# Patient Record
Sex: MALE | Race: WHITE | Employment: OTHER | URBAN - METROPOLITAN AREA
[De-identification: names, ages, dates, MRNs, and addresses within clinical notes are randomized per-mention and may not be internally consistent; named-entity substitution may affect disease eponyms.]

---

## 2017-03-31 ENCOUNTER — HOSPITAL ENCOUNTER (OUTPATIENT)
Age: 67
Discharge: HOME | End: 2017-03-31
Payer: MEDICARE

## 2017-03-31 LAB
ALBUMIN SERPL-MCNC: 3.7 G/DL (ref 3.5–5.2)
ALBUMIN/GLOBULIN RATIO: 1.1 (ref 1–2.5)
ALP BLD-CCNC: 72 U/L (ref 40–129)
ALT SERPL-CCNC: 30 U/L (ref 5–41)
AMYLASE: 68 U/L (ref 28–100)
AST SERPL-CCNC: 33 U/L
BILIRUB SERPL-MCNC: 1.31 MG/DL (ref 0.3–1.2)
BILIRUBIN DIRECT: 0.28 MG/DL
BILIRUBIN, INDIRECT: 1.03 MG/DL (ref 0–1)
GLOBULIN: ABNORMAL G/DL (ref 1.5–3.8)
LIPASE: 51 U/L (ref 13–60)
TOTAL PROTEIN: 7.1 G/DL (ref 6.4–8.3)

## 2017-03-31 PROCEDURE — 83690 ASSAY OF LIPASE: CPT

## 2017-03-31 PROCEDURE — 80076 HEPATIC FUNCTION PANEL: CPT

## 2017-03-31 PROCEDURE — 36415 COLL VENOUS BLD VENIPUNCTURE: CPT

## 2017-03-31 PROCEDURE — 82150 ASSAY OF AMYLASE: CPT

## 2017-04-06 ENCOUNTER — HOSPITAL ENCOUNTER (OUTPATIENT)
Age: 67
Discharge: HOME | End: 2017-04-06
Payer: MEDICARE

## 2017-04-06 ENCOUNTER — HOSPITAL ENCOUNTER (OUTPATIENT)
Dept: CT IMAGING | Age: 67
Discharge: HOME | End: 2017-04-06
Payer: MEDICARE

## 2017-04-06 DIAGNOSIS — R10.13 EPIGASTRIC PAIN: ICD-10-CM

## 2017-04-06 DIAGNOSIS — B19.20 UNSPECIFIED VIRAL HEPATITIS C WITHOUT HEPATIC COMA: ICD-10-CM

## 2017-04-06 DIAGNOSIS — K74.60 HEPATIC CIRRHOSIS, UNSPECIFIED HEPATIC CIRRHOSIS TYPE (HCC): ICD-10-CM

## 2017-04-06 LAB
BUN BLDV-MCNC: 24 MG/DL (ref 8–23)
CREAT SERPL-MCNC: 0.89 MG/DL (ref 0.7–1.2)
GFR AFRICAN AMERICAN: >60 ML/MIN
GFR NON-AFRICAN AMERICAN: >60 ML/MIN
GFR SERPL CREATININE-BSD FRML MDRD: ABNORMAL ML/MIN/{1.73_M2}
GFR SERPL CREATININE-BSD FRML MDRD: ABNORMAL ML/MIN/{1.73_M2}

## 2017-04-06 PROCEDURE — 82565 ASSAY OF CREATININE: CPT

## 2017-04-06 PROCEDURE — 84520 ASSAY OF UREA NITROGEN: CPT

## 2017-04-06 PROCEDURE — 6360000004 HC RX CONTRAST MEDICATION: Performed by: INTERNAL MEDICINE

## 2017-04-06 PROCEDURE — 74177 CT ABD & PELVIS W/CONTRAST: CPT

## 2017-04-06 RX ADMIN — IOHEXOL 130 ML: 350 INJECTION, SOLUTION INTRAVENOUS at 09:57

## 2017-04-06 RX ADMIN — IOHEXOL 50 ML: 240 INJECTION, SOLUTION INTRATHECAL; INTRAVASCULAR; INTRAVENOUS; ORAL at 09:58

## 2017-04-14 ENCOUNTER — HOSPITAL ENCOUNTER (OUTPATIENT)
Age: 67
Setting detail: SPECIMEN
Discharge: HOME | End: 2017-04-14
Payer: MEDICARE

## 2017-04-18 LAB — SURGICAL PATHOLOGY REPORT: NORMAL

## 2017-07-13 ENCOUNTER — HOSPITAL ENCOUNTER (OUTPATIENT)
Age: 67
Discharge: HOME | End: 2017-07-13
Payer: MEDICARE

## 2017-07-13 LAB
AFP: 2.6 UG/L
ALBUMIN SERPL-MCNC: 3.7 G/DL (ref 3.5–5.2)
ALBUMIN/GLOBULIN RATIO: 1.2 (ref 1–2.5)
ALP BLD-CCNC: 70 U/L (ref 40–129)
ALT SERPL-CCNC: 30 U/L (ref 5–41)
ANION GAP SERPL CALCULATED.3IONS-SCNC: 8 MMOL/L (ref 9–17)
AST SERPL-CCNC: 30 U/L
BILIRUB SERPL-MCNC: 1.67 MG/DL (ref 0.3–1.2)
BUN BLDV-MCNC: 25 MG/DL (ref 8–23)
BUN/CREAT BLD: ABNORMAL (ref 9–20)
CALCIUM SERPL-MCNC: 9.1 MG/DL (ref 8.6–10.4)
CHLORIDE BLD-SCNC: 102 MMOL/L (ref 98–107)
CO2: 24 MMOL/L (ref 20–31)
CREAT SERPL-MCNC: 0.68 MG/DL (ref 0.7–1.2)
GFR AFRICAN AMERICAN: >60 ML/MIN
GFR NON-AFRICAN AMERICAN: >60 ML/MIN
GFR SERPL CREATININE-BSD FRML MDRD: ABNORMAL ML/MIN/{1.73_M2}
GFR SERPL CREATININE-BSD FRML MDRD: ABNORMAL ML/MIN/{1.73_M2}
GLUCOSE BLD-MCNC: 110 MG/DL (ref 70–99)
HCT VFR BLD CALC: 41.3 % (ref 41–53)
HEMOGLOBIN: 13.9 G/DL (ref 13.5–17.5)
INR BLD: 1
MCH RBC QN AUTO: 31.1 PG (ref 26–34)
MCHC RBC AUTO-ENTMCNC: 33.6 G/DL (ref 31–37)
MCV RBC AUTO: 92.6 FL (ref 80–100)
PDW BLD-RTO: 15.1 % (ref 12.5–15.4)
PLATELET # BLD: 55 K/UL (ref 140–450)
PMV BLD AUTO: 8.2 FL (ref 6–12)
POTASSIUM SERPL-SCNC: 4.5 MMOL/L (ref 3.7–5.3)
PROTHROMBIN TIME: 11.3 SEC (ref 9.4–12.6)
RBC # BLD: 4.46 M/UL (ref 4.5–5.9)
SODIUM BLD-SCNC: 134 MMOL/L (ref 135–144)
TOTAL PROTEIN: 6.9 G/DL (ref 6.4–8.3)
WBC # BLD: 3.4 K/UL (ref 3.5–11)

## 2017-07-13 PROCEDURE — 85027 COMPLETE CBC AUTOMATED: CPT

## 2017-07-13 PROCEDURE — 36415 COLL VENOUS BLD VENIPUNCTURE: CPT

## 2017-07-13 PROCEDURE — 85610 PROTHROMBIN TIME: CPT

## 2017-07-13 PROCEDURE — 82105 ALPHA-FETOPROTEIN SERUM: CPT

## 2017-07-13 PROCEDURE — 80053 COMPREHEN METABOLIC PANEL: CPT

## 2017-11-08 ENCOUNTER — HOSPITAL ENCOUNTER (OUTPATIENT)
Dept: ULTRASOUND IMAGING | Age: 67
Discharge: HOME | End: 2017-11-08
Payer: MEDICARE

## 2017-11-08 DIAGNOSIS — K70.30 ALCOHOLIC CIRRHOSIS OF LIVER WITHOUT ASCITES (HCC): ICD-10-CM

## 2017-11-08 PROCEDURE — 76705 ECHO EXAM OF ABDOMEN: CPT

## 2017-12-18 ENCOUNTER — APPOINTMENT (OUTPATIENT)
Dept: ULTRASOUND IMAGING | Age: 67
End: 2017-12-18
Attending: INTERNAL MEDICINE
Payer: MEDICARE

## 2017-12-18 ENCOUNTER — ANESTHESIA (OUTPATIENT)
Dept: ENDOSCOPY | Age: 67
End: 2017-12-18
Payer: MEDICARE

## 2017-12-18 ENCOUNTER — HOSPITAL ENCOUNTER (OUTPATIENT)
Age: 67
Setting detail: OUTPATIENT SURGERY
Discharge: HOME | End: 2017-12-18
Attending: INTERNAL MEDICINE | Admitting: INTERNAL MEDICINE
Payer: MEDICARE

## 2017-12-18 ENCOUNTER — ANESTHESIA EVENT (OUTPATIENT)
Dept: ENDOSCOPY | Age: 67
End: 2017-12-18
Payer: MEDICARE

## 2017-12-18 VITALS
SYSTOLIC BLOOD PRESSURE: 133 MMHG | OXYGEN SATURATION: 96 % | DIASTOLIC BLOOD PRESSURE: 61 MMHG | RESPIRATION RATE: 10 BRPM | WEIGHT: 238 LBS | HEART RATE: 65 BPM | BODY MASS INDEX: 34.07 KG/M2 | HEIGHT: 70 IN | TEMPERATURE: 97.7 F

## 2017-12-18 VITALS
RESPIRATION RATE: 13 BRPM | SYSTOLIC BLOOD PRESSURE: 80 MMHG | DIASTOLIC BLOOD PRESSURE: 49 MMHG | OXYGEN SATURATION: 94 %

## 2017-12-18 LAB — POC POTASSIUM: 4.6 MMOL/L (ref 3.5–4.5)

## 2017-12-18 PROCEDURE — 6370000000 HC RX 637 (ALT 250 FOR IP): Performed by: ANESTHESIOLOGY

## 2017-12-18 PROCEDURE — 76975 GI ENDOSCOPIC ULTRASOUND: CPT

## 2017-12-18 PROCEDURE — 3700000001 HC ADD 15 MINUTES (ANESTHESIA): Performed by: INTERNAL MEDICINE

## 2017-12-18 PROCEDURE — 6360000002 HC RX W HCPCS: Performed by: NURSE ANESTHETIST, CERTIFIED REGISTERED

## 2017-12-18 PROCEDURE — 2580000003 HC RX 258: Performed by: NURSE ANESTHETIST, CERTIFIED REGISTERED

## 2017-12-18 PROCEDURE — 88305 TISSUE EXAM BY PATHOLOGIST: CPT

## 2017-12-18 PROCEDURE — 7100000010 HC PHASE II RECOVERY - FIRST 15 MIN: Performed by: INTERNAL MEDICINE

## 2017-12-18 PROCEDURE — 2500000003 HC RX 250 WO HCPCS: Performed by: NURSE ANESTHETIST, CERTIFIED REGISTERED

## 2017-12-18 PROCEDURE — 7100000011 HC PHASE II RECOVERY - ADDTL 15 MIN: Performed by: INTERNAL MEDICINE

## 2017-12-18 PROCEDURE — 3609012400 HC EGD TRANSORAL BIOPSY SINGLE/MULTIPLE: Performed by: INTERNAL MEDICINE

## 2017-12-18 PROCEDURE — 3609018700 HC ENDOSCOPIC ULTRASOUND: Performed by: INTERNAL MEDICINE

## 2017-12-18 PROCEDURE — 3700000000 HC ANESTHESIA ATTENDED CARE: Performed by: INTERNAL MEDICINE

## 2017-12-18 PROCEDURE — 84132 ASSAY OF SERUM POTASSIUM: CPT

## 2017-12-18 RX ORDER — FENTANYL CITRATE 50 UG/ML
INJECTION, SOLUTION INTRAMUSCULAR; INTRAVENOUS PRN
Status: DISCONTINUED | OUTPATIENT
Start: 2017-12-18 | End: 2017-12-18 | Stop reason: SDUPTHER

## 2017-12-18 RX ORDER — GLYCOPYRROLATE 0.2 MG/ML
INJECTION INTRAMUSCULAR; INTRAVENOUS PRN
Status: DISCONTINUED | OUTPATIENT
Start: 2017-12-18 | End: 2017-12-18 | Stop reason: SDUPTHER

## 2017-12-18 RX ORDER — LIDOCAINE HYDROCHLORIDE 10 MG/ML
INJECTION, SOLUTION EPIDURAL; INFILTRATION; INTRACAUDAL; PERINEURAL PRN
Status: DISCONTINUED | OUTPATIENT
Start: 2017-12-18 | End: 2017-12-18 | Stop reason: SDUPTHER

## 2017-12-18 RX ORDER — M-VIT,TX,IRON,MINS/CALC/FOLIC 27MG-0.4MG
1 TABLET ORAL DAILY
COMMUNITY

## 2017-12-18 RX ORDER — IPRATROPIUM BROMIDE AND ALBUTEROL SULFATE 2.5; .5 MG/3ML; MG/3ML
1 SOLUTION RESPIRATORY (INHALATION)
Status: DISCONTINUED | OUTPATIENT
Start: 2017-12-18 | End: 2017-12-18 | Stop reason: HOSPADM

## 2017-12-18 RX ORDER — PROPOFOL 10 MG/ML
INJECTION, EMULSION INTRAVENOUS CONTINUOUS PRN
Status: DISCONTINUED | OUTPATIENT
Start: 2017-12-18 | End: 2017-12-18 | Stop reason: SDUPTHER

## 2017-12-18 RX ORDER — SODIUM CHLORIDE 9 MG/ML
INJECTION, SOLUTION INTRAVENOUS CONTINUOUS
Status: DISCONTINUED | OUTPATIENT
Start: 2017-12-18 | End: 2017-12-18 | Stop reason: HOSPADM

## 2017-12-18 RX ORDER — PROPOFOL 10 MG/ML
INJECTION, EMULSION INTRAVENOUS PRN
Status: DISCONTINUED | OUTPATIENT
Start: 2017-12-18 | End: 2017-12-18 | Stop reason: SDUPTHER

## 2017-12-18 RX ORDER — SODIUM CHLORIDE 9 MG/ML
INJECTION, SOLUTION INTRAVENOUS CONTINUOUS PRN
Status: DISCONTINUED | OUTPATIENT
Start: 2017-12-18 | End: 2017-12-18 | Stop reason: SDUPTHER

## 2017-12-18 RX ADMIN — FENTANYL CITRATE 50 MCG: 50 INJECTION INTRAMUSCULAR; INTRAVENOUS at 08:38

## 2017-12-18 RX ADMIN — GLYCOPYRROLATE 0.2 MG: 0.2 INJECTION, SOLUTION INTRAMUSCULAR; INTRAVENOUS at 08:36

## 2017-12-18 RX ADMIN — PROPOFOL 30 MG: 10 INJECTION, EMULSION INTRAVENOUS at 08:44

## 2017-12-18 RX ADMIN — PHENYLEPHRINE HYDROCHLORIDE 50 MCG: 10 INJECTION INTRAVENOUS at 09:16

## 2017-12-18 RX ADMIN — IPRATROPIUM BROMIDE AND ALBUTEROL SULFATE 1 AMPULE: .5; 3 SOLUTION RESPIRATORY (INHALATION) at 08:20

## 2017-12-18 RX ADMIN — PROPOFOL 100 MCG/KG/MIN: 10 INJECTION, EMULSION INTRAVENOUS at 08:41

## 2017-12-18 RX ADMIN — PROPOFOL 20 MG: 10 INJECTION, EMULSION INTRAVENOUS at 08:51

## 2017-12-18 RX ADMIN — PROPOFOL 20 MG: 10 INJECTION, EMULSION INTRAVENOUS at 08:47

## 2017-12-18 RX ADMIN — PROPOFOL 70 MG: 10 INJECTION, EMULSION INTRAVENOUS at 08:41

## 2017-12-18 RX ADMIN — LIDOCAINE HYDROCHLORIDE 50 MG: 10 INJECTION, SOLUTION EPIDURAL; INFILTRATION; INTRACAUDAL; PERINEURAL at 08:41

## 2017-12-18 RX ADMIN — SODIUM CHLORIDE: 9 INJECTION, SOLUTION INTRAVENOUS at 08:34

## 2017-12-18 RX ADMIN — PROPOFOL 20 MG: 10 INJECTION, EMULSION INTRAVENOUS at 08:54

## 2017-12-18 RX ADMIN — PHENYLEPHRINE HYDROCHLORIDE 50 MCG: 10 INJECTION INTRAVENOUS at 09:07

## 2017-12-18 ASSESSMENT — LIFESTYLE VARIABLES: SMOKING_STATUS: 0

## 2017-12-18 ASSESSMENT — PAIN SCALES - GENERAL
PAINLEVEL_OUTOF10: 0

## 2017-12-18 ASSESSMENT — PAIN - FUNCTIONAL ASSESSMENT: PAIN_FUNCTIONAL_ASSESSMENT: 0-10

## 2017-12-18 ASSESSMENT — ENCOUNTER SYMPTOMS
SHORTNESS OF BREATH: 0
STRIDOR: 0

## 2017-12-18 NOTE — ANESTHESIA POSTPROCEDURE EVALUATION
Department of Anesthesiology  Postprocedure Note    Patient: Erlinda Infante  MRN: [id number]  YOB: 1950  Date of evaluation: 12/18/2017  Time:  11:35 AM     Procedure Summary     Date:  12/18/17 Room / Location:  [facility] ENDO 04 / [facility] Endoscopy    Anesthesia Start:  0834 Anesthesia Stop:  Cleotis Stager    Procedures:       ENDOSCOPIC ULTRASOUND (N/A )      EGD BIOPSY Diagnosis:  (UPPER ABDOMINAL PAIN, VARICEAL SCREENING)    Surgeon:  Sofia Cruz MD Responsible Provider:  Omer Faustin MD    Anesthesia Type:  MAC ASA Status:  3          Anesthesia Type: MAC    René Phase I: René Score: 10    René Phase II: René Score: 10    Last vitals: Reviewed and per EMR flowsheets.        Anesthesia Post Evaluation    Patient location during evaluation: PACU  Patient participation: complete - patient participated  Level of consciousness: awake and alert  Pain score: 1  Airway patency: patent  Nausea & Vomiting: no nausea and no vomiting  Complications: no  Cardiovascular status: hemodynamically stable  Respiratory status: acceptable and room air  Hydration status: stable

## 2017-12-18 NOTE — H&P
Hobbies:  Rides a [employer], watches  And TV westerns ,     OBJECTIVE:   VITALS:  height is 5' 9.5\" (1.765 m) and weight is 238 lb (108 kg). His oral temperature is 98.3 °F (36.8 °C). His blood pressure is 138/60 and his pulse is 60. His respiration is 13 and oxygen saturation is 95%. CONSTITUTIONAL: Alert and oriented times 3, no acute distress and cooperative to examination. friendly and pleasant , stocky build     SKIN: rash No    HEENT: Head is normocephalic, atraumatic. EOMI, PERRLA    Oral air way :slightly narrow Yes    NECK: neck supple, no lymphadenopathy noted, trachea midline and straight       2+ carotid, no bruit    LUNGS: Chest expands equally bilaterally upon respiration, no accessory muscle used. Ausculation reveals no adventitious breath sounds. CARDIOVASCULAR: \"Heart sounds are normal.  Regular rate and rhythm without murmur,    ABDOMEN: Bowel sounds are present in all four quadrants , bloated      GENATALIA:Deferred. NEUROLOGIC: \"CN II-XII are grossly intact. EXTREMITIES: Pitting edema:  No,   Right hand partial amputations of his digits , left hand contracture of his 2nd digit   Varicose veins: No     Dorsal pedal/posterior tibial pulses palpable: Yes         Strength:   Not tested       Patient Active Problem List   Diagnosis    Heart palpitations    Diastolic heart failure (HCC)    Alcoholic liver disease (HCC)    Hypertension    History of hepatitis C    Hyperglycemia    Hyperammonemia (HCC)    Nausea & vomiting               IMPRESSIONS:   1. Hep-C  2.  does not have any pertinent problems on file.     [facility]  Electronically signed 12/18/2017 at 8:08 AM       Scheduled for:  EUS

## 2017-12-19 LAB — SURGICAL PATHOLOGY REPORT: NORMAL

## 2018-01-15 ENCOUNTER — HOSPITAL ENCOUNTER (OUTPATIENT)
Age: 68
Discharge: HOME | End: 2018-01-15
Payer: MEDICARE

## 2018-01-15 LAB
ABSOLUTE EOS #: 0.1 K/UL (ref 0–0.44)
ABSOLUTE IMMATURE GRANULOCYTE: 0.03 K/UL (ref 0–0.3)
ABSOLUTE LYMPH #: 0.58 K/UL (ref 1.1–3.7)
ABSOLUTE MONO #: 0.28 K/UL (ref 0.1–1.2)
ALBUMIN SERPL-MCNC: 3.7 G/DL (ref 3.5–5.2)
ALBUMIN/GLOBULIN RATIO: 1.2 (ref 1–2.5)
ALP BLD-CCNC: 66 U/L (ref 40–129)
ALT SERPL-CCNC: 37 U/L (ref 5–41)
ANION GAP SERPL CALCULATED.3IONS-SCNC: 11 MMOL/L (ref 9–17)
AST SERPL-CCNC: 37 U/L
BASOPHILS # BLD: 1 % (ref 0–2)
BASOPHILS ABSOLUTE: 0.03 K/UL (ref 0–0.2)
BILIRUB SERPL-MCNC: 1.29 MG/DL (ref 0.3–1.2)
BUN BLDV-MCNC: 23 MG/DL (ref 8–23)
BUN/CREAT BLD: ABNORMAL (ref 9–20)
CALCIUM SERPL-MCNC: 8.7 MG/DL (ref 8.6–10.4)
CHLORIDE BLD-SCNC: 103 MMOL/L (ref 98–107)
CHOLESTEROL, FASTING: 152 MG/DL
CHOLESTEROL/HDL RATIO: 2.8
CO2: 24 MMOL/L (ref 20–31)
CREAT SERPL-MCNC: 0.75 MG/DL (ref 0.7–1.2)
DIFFERENTIAL TYPE: ABNORMAL
EOSINOPHILS RELATIVE PERCENT: 4 % (ref 1–4)
ESTIMATED AVERAGE GLUCOSE: 114 MG/DL
GFR AFRICAN AMERICAN: >60 ML/MIN
GFR NON-AFRICAN AMERICAN: >60 ML/MIN
GFR SERPL CREATININE-BSD FRML MDRD: ABNORMAL ML/MIN/{1.73_M2}
GFR SERPL CREATININE-BSD FRML MDRD: ABNORMAL ML/MIN/{1.73_M2}
GLUCOSE BLD-MCNC: 115 MG/DL (ref 70–99)
HBA1C MFR BLD: 5.6 % (ref 4–6)
HCT VFR BLD CALC: 40.2 % (ref 40.7–50.3)
HDLC SERPL-MCNC: 55 MG/DL
HEMOGLOBIN: 12.9 G/DL (ref 13–17)
IMMATURE GRANULOCYTES: 1 %
LDL CHOLESTEROL: 81 MG/DL (ref 0–130)
LYMPHOCYTES # BLD: 23 % (ref 24–43)
MCH RBC QN AUTO: 31.9 PG (ref 25.2–33.5)
MCHC RBC AUTO-ENTMCNC: 32.1 G/DL (ref 28.4–34.8)
MCV RBC AUTO: 99.5 FL (ref 82.6–102.9)
MONOCYTES # BLD: 11 % (ref 3–12)
MORPHOLOGY: NORMAL
PDW BLD-RTO: 13.3 % (ref 11.8–14.4)
PLATELET # BLD: ABNORMAL K/UL (ref 138–453)
PLATELET ESTIMATE: ABNORMAL
PLATELET, FLUORESCENCE: 50 K/UL (ref 138–453)
PLATELET, IMMATURE FRACTION: 3.5 % (ref 1.1–10.3)
PMV BLD AUTO: ABNORMAL FL (ref 8.1–13.5)
POTASSIUM SERPL-SCNC: 4.5 MMOL/L (ref 3.7–5.3)
PROSTATE SPECIFIC ANTIGEN: 0.29 UG/L
RBC # BLD: 4.04 M/UL (ref 4.21–5.77)
RBC # BLD: ABNORMAL 10*6/UL
SEG NEUTROPHILS: 60 % (ref 36–65)
SEGMENTED NEUTROPHILS ABSOLUTE COUNT: 1.48 K/UL (ref 1.5–8.1)
SODIUM BLD-SCNC: 138 MMOL/L (ref 135–144)
TOTAL PROTEIN: 6.7 G/DL (ref 6.4–8.3)
TRIGLYCERIDE, FASTING: 81 MG/DL
TSH SERPL DL<=0.05 MIU/L-ACNC: 1.63 MIU/L (ref 0.3–5)
VLDLC SERPL CALC-MCNC: NORMAL MG/DL (ref 1–30)
WBC # BLD: 2.5 K/UL (ref 3.5–11.3)
WBC # BLD: ABNORMAL 10*3/UL

## 2018-01-15 PROCEDURE — 36415 COLL VENOUS BLD VENIPUNCTURE: CPT

## 2018-01-15 PROCEDURE — 83036 HEMOGLOBIN GLYCOSYLATED A1C: CPT

## 2018-01-15 PROCEDURE — 80053 COMPREHEN METABOLIC PANEL: CPT

## 2018-01-15 PROCEDURE — 80061 LIPID PANEL: CPT

## 2018-01-15 PROCEDURE — G0103 PSA SCREENING: HCPCS

## 2018-01-15 PROCEDURE — 84443 ASSAY THYROID STIM HORMONE: CPT

## 2018-01-15 PROCEDURE — 85025 COMPLETE CBC W/AUTO DIFF WBC: CPT

## 2018-04-25 ENCOUNTER — HOSPITAL ENCOUNTER (OUTPATIENT)
Dept: MRI IMAGING | Age: 68
Discharge: HOME | End: 2018-04-27
Payer: MEDICARE

## 2018-04-25 DIAGNOSIS — M25.561 RIGHT KNEE PAIN, UNSPECIFIED CHRONICITY: ICD-10-CM

## 2018-04-25 PROCEDURE — 73721 MRI JNT OF LWR EXTRE W/O DYE: CPT

## 2018-07-03 ENCOUNTER — HOSPITAL ENCOUNTER (OUTPATIENT)
Dept: ULTRASOUND IMAGING | Age: 68
Discharge: HOME | End: 2018-07-05
Payer: MEDICARE

## 2018-07-03 DIAGNOSIS — K74.60 CIRRHOSIS OF LIVER WITHOUT ASCITES, UNSPECIFIED HEPATIC CIRRHOSIS TYPE (HCC): ICD-10-CM

## 2018-07-03 PROCEDURE — 76705 ECHO EXAM OF ABDOMEN: CPT

## 2018-07-10 ENCOUNTER — HOSPITAL ENCOUNTER (OUTPATIENT)
Age: 68
Discharge: HOME | End: 2018-07-10
Payer: MEDICARE

## 2018-07-10 LAB
AFP: 2.2 UG/L
ALBUMIN SERPL-MCNC: 3.7 G/DL (ref 3.5–5.2)
ALBUMIN/GLOBULIN RATIO: 1.2 (ref 1–2.5)
ALP BLD-CCNC: 77 U/L (ref 40–129)
ALT SERPL-CCNC: 26 U/L (ref 5–41)
ANION GAP SERPL CALCULATED.3IONS-SCNC: 11 MMOL/L (ref 9–17)
AST SERPL-CCNC: 29 U/L
BILIRUB SERPL-MCNC: 1.16 MG/DL (ref 0.3–1.2)
BUN BLDV-MCNC: 19 MG/DL (ref 8–23)
BUN/CREAT BLD: ABNORMAL (ref 9–20)
CALCIUM SERPL-MCNC: 9.3 MG/DL (ref 8.6–10.4)
CHLORIDE BLD-SCNC: 105 MMOL/L (ref 98–107)
CO2: 25 MMOL/L (ref 20–31)
CREAT SERPL-MCNC: 0.77 MG/DL (ref 0.7–1.2)
GFR AFRICAN AMERICAN: >60 ML/MIN
GFR NON-AFRICAN AMERICAN: >60 ML/MIN
GFR SERPL CREATININE-BSD FRML MDRD: ABNORMAL ML/MIN/{1.73_M2}
GFR SERPL CREATININE-BSD FRML MDRD: ABNORMAL ML/MIN/{1.73_M2}
GLUCOSE BLD-MCNC: 111 MG/DL (ref 70–99)
HCT VFR BLD CALC: 40 % (ref 40.7–50.3)
HEMOGLOBIN: 12.9 G/DL (ref 13–17)
INR BLD: 1
MCH RBC QN AUTO: 31.3 PG (ref 25.2–33.5)
MCHC RBC AUTO-ENTMCNC: 32.3 G/DL (ref 28.4–34.8)
MCV RBC AUTO: 97.1 FL (ref 82.6–102.9)
NRBC AUTOMATED: 0 PER 100 WBC
PDW BLD-RTO: 13.1 % (ref 11.8–14.4)
PLATELET # BLD: 57 K/UL (ref 138–453)
PMV BLD AUTO: 11 FL (ref 8.1–13.5)
POTASSIUM SERPL-SCNC: 4.3 MMOL/L (ref 3.7–5.3)
PROTHROMBIN TIME: 10.8 SEC (ref 9–12)
RBC # BLD: 4.12 M/UL (ref 4.21–5.77)
SODIUM BLD-SCNC: 141 MMOL/L (ref 135–144)
TOTAL PROTEIN: 6.8 G/DL (ref 6.4–8.3)
WBC # BLD: 3.3 K/UL (ref 3.5–11.3)

## 2018-07-10 PROCEDURE — 80053 COMPREHEN METABOLIC PANEL: CPT

## 2018-07-10 PROCEDURE — 85610 PROTHROMBIN TIME: CPT

## 2018-07-10 PROCEDURE — 82105 ALPHA-FETOPROTEIN SERUM: CPT

## 2018-07-10 PROCEDURE — 85027 COMPLETE CBC AUTOMATED: CPT

## 2018-07-10 PROCEDURE — G0480 DRUG TEST DEF 1-7 CLASSES: HCPCS

## 2018-07-10 PROCEDURE — 36415 COLL VENOUS BLD VENIPUNCTURE: CPT

## 2018-07-17 LAB
SEND OUT REPORT: NORMAL
TEST NAME: NORMAL

## 2018-10-01 ENCOUNTER — HOSPITAL ENCOUNTER (OUTPATIENT)
Age: 68
Discharge: HOME | End: 2018-10-01
Payer: MEDICARE

## 2018-10-01 LAB
ABSOLUTE EOS #: 0.08 K/UL (ref 0–0.44)
ABSOLUTE IMMATURE GRANULOCYTE: <0.03 K/UL (ref 0–0.3)
ABSOLUTE LYMPH #: 0.7 K/UL (ref 1.1–3.7)
ABSOLUTE MONO #: 0.4 K/UL (ref 0.1–1.2)
AFP: 2.2 UG/L
ALBUMIN SERPL-MCNC: 3.8 G/DL (ref 3.5–5.2)
ALBUMIN/GLOBULIN RATIO: 1.1 (ref 1–2.5)
ALP BLD-CCNC: 71 U/L (ref 40–129)
ALT SERPL-CCNC: 31 U/L (ref 5–41)
ANION GAP SERPL CALCULATED.3IONS-SCNC: 11 MMOL/L (ref 9–17)
AST SERPL-CCNC: 34 U/L
BASOPHILS # BLD: 1 % (ref 0–2)
BASOPHILS ABSOLUTE: <0.03 K/UL (ref 0–0.2)
BILIRUB SERPL-MCNC: 1.44 MG/DL (ref 0.3–1.2)
BUN BLDV-MCNC: 20 MG/DL (ref 8–23)
BUN/CREAT BLD: ABNORMAL (ref 9–20)
CALCIUM SERPL-MCNC: 9.4 MG/DL (ref 8.6–10.4)
CHLORIDE BLD-SCNC: 104 MMOL/L (ref 98–107)
CO2: 23 MMOL/L (ref 20–31)
CREAT SERPL-MCNC: 0.72 MG/DL (ref 0.7–1.2)
DIFFERENTIAL TYPE: ABNORMAL
EOSINOPHILS RELATIVE PERCENT: 2 % (ref 1–4)
GFR AFRICAN AMERICAN: >60 ML/MIN
GFR NON-AFRICAN AMERICAN: >60 ML/MIN
GFR SERPL CREATININE-BSD FRML MDRD: ABNORMAL ML/MIN/{1.73_M2}
GFR SERPL CREATININE-BSD FRML MDRD: ABNORMAL ML/MIN/{1.73_M2}
GLUCOSE BLD-MCNC: 115 MG/DL (ref 70–99)
HCT VFR BLD CALC: 44.4 % (ref 40.7–50.3)
HEMOGLOBIN: 14.5 G/DL (ref 13–17)
IMMATURE GRANULOCYTES: 1 %
INR BLD: 1
LYMPHOCYTES # BLD: 20 % (ref 24–43)
MCH RBC QN AUTO: 30.8 PG (ref 25.2–33.5)
MCHC RBC AUTO-ENTMCNC: 32.7 G/DL (ref 28.4–34.8)
MCV RBC AUTO: 94.3 FL (ref 82.6–102.9)
MONOCYTES # BLD: 12 % (ref 3–12)
NRBC AUTOMATED: 0 PER 100 WBC
PDW BLD-RTO: 13.6 % (ref 11.8–14.4)
PLATELET # BLD: ABNORMAL K/UL (ref 138–453)
PLATELET ESTIMATE: ABNORMAL
PLATELET, FLUORESCENCE: 58 K/UL (ref 138–453)
PLATELET, IMMATURE FRACTION: 3.5 % (ref 1.1–10.3)
PMV BLD AUTO: ABNORMAL FL (ref 8.1–13.5)
POTASSIUM SERPL-SCNC: 4.2 MMOL/L (ref 3.7–5.3)
PROTHROMBIN TIME: 10.8 SEC (ref 9–12)
RBC # BLD: 4.71 M/UL (ref 4.21–5.77)
RBC # BLD: ABNORMAL 10*6/UL
SEG NEUTROPHILS: 65 % (ref 36–65)
SEGMENTED NEUTROPHILS ABSOLUTE COUNT: 2.25 K/UL (ref 1.5–8.1)
SODIUM BLD-SCNC: 138 MMOL/L (ref 135–144)
TOTAL PROTEIN: 7.2 G/DL (ref 6.4–8.3)
WBC # BLD: 3.5 K/UL (ref 3.5–11.3)
WBC # BLD: ABNORMAL 10*3/UL

## 2018-10-01 PROCEDURE — 85055 RETICULATED PLATELET ASSAY: CPT

## 2018-10-01 PROCEDURE — 85610 PROTHROMBIN TIME: CPT

## 2018-10-01 PROCEDURE — 85025 COMPLETE CBC W/AUTO DIFF WBC: CPT

## 2018-10-01 PROCEDURE — 36415 COLL VENOUS BLD VENIPUNCTURE: CPT

## 2018-10-01 PROCEDURE — 80053 COMPREHEN METABOLIC PANEL: CPT

## 2018-10-01 PROCEDURE — 82105 ALPHA-FETOPROTEIN SERUM: CPT

## 2019-01-19 ENCOUNTER — HOSPITAL ENCOUNTER (OUTPATIENT)
Age: 69
Discharge: HOME | End: 2019-01-19
Payer: MEDICARE

## 2019-01-19 LAB
ABSOLUTE EOS #: 0.12 K/UL (ref 0–0.44)
ABSOLUTE IMMATURE GRANULOCYTE: <0.03 K/UL (ref 0–0.3)
ABSOLUTE LYMPH #: 0.77 K/UL (ref 1.1–3.7)
ABSOLUTE MONO #: 0.34 K/UL (ref 0.1–1.2)
ALBUMIN SERPL-MCNC: 3.9 G/DL (ref 3.5–5.2)
ALBUMIN/GLOBULIN RATIO: 1.3 (ref 1–2.5)
ALP BLD-CCNC: 68 U/L (ref 40–129)
ALT SERPL-CCNC: 32 U/L (ref 5–41)
ANION GAP SERPL CALCULATED.3IONS-SCNC: 12 MMOL/L (ref 9–17)
AST SERPL-CCNC: 36 U/L
BASOPHILS # BLD: 1 % (ref 0–2)
BASOPHILS ABSOLUTE: <0.03 K/UL (ref 0–0.2)
BILIRUB SERPL-MCNC: 1.48 MG/DL (ref 0.3–1.2)
BUN BLDV-MCNC: 33 MG/DL (ref 8–23)
BUN/CREAT BLD: ABNORMAL (ref 9–20)
CALCIUM SERPL-MCNC: 10.2 MG/DL (ref 8.6–10.4)
CHLORIDE BLD-SCNC: 104 MMOL/L (ref 98–107)
CHOLESTEROL/HDL RATIO: 3.2
CHOLESTEROL: 170 MG/DL
CO2: 23 MMOL/L (ref 20–31)
CREAT SERPL-MCNC: 1.1 MG/DL (ref 0.7–1.2)
DIFFERENTIAL TYPE: ABNORMAL
EOSINOPHILS RELATIVE PERCENT: 4 % (ref 1–4)
GFR AFRICAN AMERICAN: >60 ML/MIN
GFR NON-AFRICAN AMERICAN: >60 ML/MIN
GFR SERPL CREATININE-BSD FRML MDRD: ABNORMAL ML/MIN/{1.73_M2}
GFR SERPL CREATININE-BSD FRML MDRD: ABNORMAL ML/MIN/{1.73_M2}
GLUCOSE BLD-MCNC: 118 MG/DL (ref 70–99)
HCT VFR BLD CALC: 43.7 % (ref 40.7–50.3)
HDLC SERPL-MCNC: 53 MG/DL
HEMOGLOBIN: 14.2 G/DL (ref 13–17)
IMMATURE GRANULOCYTES: 1 %
LDL CHOLESTEROL: 99 MG/DL (ref 0–130)
LYMPHOCYTES # BLD: 24 % (ref 24–43)
MCH RBC QN AUTO: 31.9 PG (ref 25.2–33.5)
MCHC RBC AUTO-ENTMCNC: 32.5 G/DL (ref 28.4–34.8)
MCV RBC AUTO: 98.2 FL (ref 82.6–102.9)
MONOCYTES # BLD: 10 % (ref 3–12)
NRBC AUTOMATED: 0 PER 100 WBC
PDW BLD-RTO: 13.4 % (ref 11.8–14.4)
PLATELET # BLD: 57 K/UL (ref 138–453)
PLATELET ESTIMATE: ABNORMAL
PMV BLD AUTO: 10.4 FL (ref 8.1–13.5)
POTASSIUM SERPL-SCNC: 4.3 MMOL/L (ref 3.7–5.3)
PROSTATE SPECIFIC ANTIGEN: 0.45 UG/L
RBC # BLD: 4.45 M/UL (ref 4.21–5.77)
RBC # BLD: ABNORMAL 10*6/UL
SEG NEUTROPHILS: 60 % (ref 36–65)
SEGMENTED NEUTROPHILS ABSOLUTE COUNT: 1.99 K/UL (ref 1.5–8.1)
SODIUM BLD-SCNC: 139 MMOL/L (ref 135–144)
TOTAL PROTEIN: 6.8 G/DL (ref 6.4–8.3)
TRIGL SERPL-MCNC: 92 MG/DL
TSH SERPL DL<=0.05 MIU/L-ACNC: 2.12 MIU/L (ref 0.3–5)
VLDLC SERPL CALC-MCNC: NORMAL MG/DL (ref 1–30)
WBC # BLD: 3.3 K/UL (ref 3.5–11.3)
WBC # BLD: ABNORMAL 10*3/UL

## 2019-01-19 PROCEDURE — 85025 COMPLETE CBC W/AUTO DIFF WBC: CPT

## 2019-01-19 PROCEDURE — 84443 ASSAY THYROID STIM HORMONE: CPT

## 2019-01-19 PROCEDURE — 80061 LIPID PANEL: CPT

## 2019-01-19 PROCEDURE — 36415 COLL VENOUS BLD VENIPUNCTURE: CPT

## 2019-01-19 PROCEDURE — 80053 COMPREHEN METABOLIC PANEL: CPT

## 2019-01-19 PROCEDURE — G0103 PSA SCREENING: HCPCS

## 2019-01-25 ENCOUNTER — HOSPITAL ENCOUNTER (OUTPATIENT)
Dept: ULTRASOUND IMAGING | Age: 69
Discharge: HOME | End: 2019-01-27
Payer: MEDICARE

## 2019-01-25 DIAGNOSIS — K74.60 CIRRHOSIS OF LIVER WITHOUT ASCITES, UNSPECIFIED HEPATIC CIRRHOSIS TYPE (HCC): ICD-10-CM

## 2019-01-25 PROCEDURE — 76705 ECHO EXAM OF ABDOMEN: CPT

## 2019-03-07 ENCOUNTER — HOSPITAL ENCOUNTER (OUTPATIENT)
Age: 69
Discharge: HOME | End: 2019-03-07
Payer: MEDICARE

## 2019-03-07 ENCOUNTER — HOSPITAL ENCOUNTER (OUTPATIENT)
Dept: CT IMAGING | Age: 69
Discharge: HOME | End: 2019-03-09
Payer: MEDICARE

## 2019-03-07 DIAGNOSIS — R93.89 ULTRASOUND SCAN ABNORMAL: Primary | ICD-10-CM

## 2019-03-07 DIAGNOSIS — R93.89 ULTRASOUND SCAN ABNORMAL: ICD-10-CM

## 2019-03-07 LAB
CREAT SERPL-MCNC: 1.01 MG/DL (ref 0.7–1.2)
GFR AFRICAN AMERICAN: >60 ML/MIN
GFR NON-AFRICAN AMERICAN: >60 ML/MIN
GFR SERPL CREATININE-BSD FRML MDRD: NORMAL ML/MIN/{1.73_M2}
GFR SERPL CREATININE-BSD FRML MDRD: NORMAL ML/MIN/{1.73_M2}

## 2019-03-07 PROCEDURE — 74170 CT ABD WO CNTRST FLWD CNTRST: CPT

## 2019-03-07 PROCEDURE — 36415 COLL VENOUS BLD VENIPUNCTURE: CPT

## 2019-03-07 PROCEDURE — 6360000004 HC RX CONTRAST MEDICATION: Performed by: INTERNAL MEDICINE

## 2019-03-07 PROCEDURE — 82565 ASSAY OF CREATININE: CPT

## 2019-03-07 RX ADMIN — IOVERSOL 100 ML: 741 INJECTION INTRA-ARTERIAL; INTRAVENOUS at 11:37

## 2019-04-16 ENCOUNTER — APPOINTMENT (OUTPATIENT)
Dept: GENERAL RADIOLOGY | Age: 69
End: 2019-04-16
Payer: OTHER MISCELLANEOUS

## 2019-04-16 ENCOUNTER — APPOINTMENT (OUTPATIENT)
Dept: CT IMAGING | Age: 69
End: 2019-04-16
Payer: OTHER MISCELLANEOUS

## 2019-04-16 ENCOUNTER — HOSPITAL ENCOUNTER (EMERGENCY)
Age: 69
Discharge: HOME | End: 2019-04-16
Attending: EMERGENCY MEDICINE
Payer: OTHER MISCELLANEOUS

## 2019-04-16 VITALS
RESPIRATION RATE: 18 BRPM | SYSTOLIC BLOOD PRESSURE: 141 MMHG | OXYGEN SATURATION: 96 % | BODY MASS INDEX: 34.96 KG/M2 | WEIGHT: 236 LBS | HEART RATE: 60 BPM | DIASTOLIC BLOOD PRESSURE: 52 MMHG | TEMPERATURE: 97.5 F | HEIGHT: 69 IN

## 2019-04-16 DIAGNOSIS — R03.0 ELEVATED BLOOD PRESSURE READING: ICD-10-CM

## 2019-04-16 DIAGNOSIS — S39.012A STRAIN OF LUMBAR REGION, INITIAL ENCOUNTER: ICD-10-CM

## 2019-04-16 DIAGNOSIS — S16.1XXA ACUTE STRAIN OF NECK MUSCLE, INITIAL ENCOUNTER: ICD-10-CM
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
## 2019-04-16 PROCEDURE — 72100 X-RAY EXAM L-S SPINE 2/3 VWS: CPT

## 2019-04-16 PROCEDURE — 70450 CT HEAD/BRAIN W/O DYE: CPT

## 2019-04-16 PROCEDURE — 6370000000 HC RX 637 (ALT 250 FOR IP): Performed by: PHYSICIAN ASSISTANT

## 2019-04-16 PROCEDURE — 99284 EMERGENCY DEPT VISIT MOD MDM: CPT

## 2019-04-16 PROCEDURE — 72125 CT NECK SPINE W/O DYE: CPT

## 2019-04-16 RX ORDER — METHOCARBAMOL 750 MG/1
750 TABLET, FILM COATED ORAL 4 TIMES DAILY
Qty: 40 TABLET | Refills: 0 | Status: SHIPPED | OUTPATIENT
Start: 2019-04-16 | End: 2019-04-26

## 2019-04-16 RX ORDER — NAPROXEN 500 MG/1
500 TABLET ORAL 2 TIMES DAILY WITH MEALS
Qty: 20 TABLET | Refills: 0 | Status: SHIPPED | OUTPATIENT
Start: 2019-04-16

## 2019-04-16 RX ORDER — IBUPROFEN 800 MG/1
800 TABLET ORAL ONCE
Status: COMPLETED | OUTPATIENT
Start: 2019-04-16 | End: 2019-04-16

## 2019-04-16 RX ORDER — METAXALONE 800 MG/1
800 TABLET ORAL ONCE
Status: COMPLETED | OUTPATIENT
Start: 2019-04-16 | End: 2019-04-16

## 2019-04-16 RX ORDER — HYDROCODONE BITARTRATE AND ACETAMINOPHEN 5; 325 MG/1; MG/1
1-2 TABLET ORAL EVERY 8 HOURS PRN
Qty: 10 TABLET | Refills: 0 | Status: SHIPPED | OUTPATIENT
Start: 2019-04-16 | End: 2019-04-19

## 2019-04-16 RX ORDER — CHLORAL HYDRATE 500 MG
3000 CAPSULE ORAL 3 TIMES DAILY
COMMUNITY

## 2019-04-16 RX ADMIN — IBUPROFEN 800 MG: 800 TABLET, FILM COATED ORAL at 12:52

## 2019-04-16 RX ADMIN — METAXALONE 800 MG: 800 TABLET ORAL at 12:52

## 2019-04-16 ASSESSMENT — PAIN SCALES - GENERAL
PAINLEVEL_OUTOF10: 8
PAINLEVEL_OUTOF10: 8

## 2019-04-16 ASSESSMENT — PAIN DESCRIPTION - PAIN TYPE: TYPE: ACUTE PAIN

## 2019-04-16 ASSESSMENT — PAIN DESCRIPTION - LOCATION: LOCATION: NECK;BACK;GENERALIZED

## 2019-04-16 ASSESSMENT — PAIN DESCRIPTION - DESCRIPTORS: DESCRIPTORS: ACHING;SHARP;SHOOTING;STABBING

## 2019-04-16 ASSESSMENT — PAIN DESCRIPTION - ORIENTATION: ORIENTATION: POSTERIOR
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
## 2019-04-16 NOTE — ED PROVIDER NOTES
I was present in the ED during this patient's evaluation and management by the Advance Practice Provider and was available to address any concerns about their medical management.     Han Johnson MD  Attending, Emergency Department      Aydee Mosher MD  04/16/19 7409

## 2019-06-18 ENCOUNTER — HOSPITAL ENCOUNTER (OUTPATIENT)
Age: 69
Setting detail: SPECIMEN
Discharge: HOME | End: 2019-06-18
Payer: MEDICARE

## 2019-06-24 LAB — SURGICAL PATHOLOGY REPORT: NORMAL

## 2019-08-19 ENCOUNTER — HOSPITAL ENCOUNTER (OUTPATIENT)
Age: 69
Discharge: HOME | End: 2019-08-19
Payer: MEDICARE

## 2019-08-19 LAB
BILIRUBIN URINE: NEGATIVE
COLOR: YELLOW
COMMENT UA: NORMAL
GLUCOSE URINE: NEGATIVE
KETONES, URINE: NEGATIVE
LEUKOCYTE ESTERASE, URINE: NEGATIVE
NITRITE, URINE: NEGATIVE
PH UA: 6.5 (ref 5–8)
PROTEIN UA: NEGATIVE
SPECIFIC GRAVITY UA: 1.02 (ref 1–1.03)
TURBIDITY: CLEAR
URINE HGB: NEGATIVE
UROBILINOGEN, URINE: NORMAL

## 2019-08-19 PROCEDURE — 81003 URINALYSIS AUTO W/O SCOPE: CPT

## 2019-08-24 ENCOUNTER — HOSPITAL ENCOUNTER (OUTPATIENT)
Age: 69
Discharge: HOME | End: 2019-08-24
Payer: MEDICARE

## 2019-08-24 LAB
FERRITIN: 152 UG/L (ref 30–400)
IRON SATURATION: 34 % (ref 20–55)
IRON: 93 UG/DL (ref 59–158)
TOTAL IRON BINDING CAPACITY: 276 UG/DL (ref 250–450)
UNSATURATED IRON BINDING CAPACITY: 183 UG/DL (ref 112–347)

## 2019-08-24 PROCEDURE — 83550 IRON BINDING TEST: CPT

## 2019-08-24 PROCEDURE — 82728 ASSAY OF FERRITIN: CPT

## 2019-08-24 PROCEDURE — 83540 ASSAY OF IRON: CPT

## 2019-08-24 PROCEDURE — 36415 COLL VENOUS BLD VENIPUNCTURE: CPT

## 2019-10-10 ENCOUNTER — HOSPITAL ENCOUNTER (OUTPATIENT)
Dept: ULTRASOUND IMAGING | Age: 69
Discharge: HOME | End: 2019-10-12
Payer: MEDICARE

## 2019-10-10 DIAGNOSIS — K70.30 ALCOHOLIC CIRRHOSIS, UNSPECIFIED WHETHER ASCITES PRESENT (HCC): ICD-10-CM

## 2019-10-10 PROCEDURE — 76705 ECHO EXAM OF ABDOMEN: CPT

## 2019-10-30 ENCOUNTER — HOSPITAL ENCOUNTER (OUTPATIENT)
Age: 69
Discharge: HOME | End: 2019-10-30
Payer: MEDICARE

## 2019-10-30 LAB
AFP: 2.8 UG/L
ALBUMIN SERPL-MCNC: 3.6 G/DL (ref 3.5–5.2)
ALBUMIN/GLOBULIN RATIO: 1.2 (ref 1–2.5)
ALP BLD-CCNC: 119 U/L (ref 40–129)
ALT SERPL-CCNC: 32 U/L (ref 5–41)
ANION GAP SERPL CALCULATED.3IONS-SCNC: 11 MMOL/L (ref 9–17)
AST SERPL-CCNC: 33 U/L
BILIRUB SERPL-MCNC: 1.05 MG/DL (ref 0.3–1.2)
BUN BLDV-MCNC: 21 MG/DL (ref 8–23)
BUN/CREAT BLD: ABNORMAL (ref 9–20)
CALCIUM SERPL-MCNC: 9.7 MG/DL (ref 8.6–10.4)
CHLORIDE BLD-SCNC: 105 MMOL/L (ref 98–107)
CO2: 23 MMOL/L (ref 20–31)
CREAT SERPL-MCNC: 0.63 MG/DL (ref 0.7–1.2)
GFR AFRICAN AMERICAN: >60 ML/MIN
GFR NON-AFRICAN AMERICAN: >60 ML/MIN
GFR SERPL CREATININE-BSD FRML MDRD: ABNORMAL ML/MIN/{1.73_M2}
GFR SERPL CREATININE-BSD FRML MDRD: ABNORMAL ML/MIN/{1.73_M2}
GLUCOSE BLD-MCNC: 160 MG/DL (ref 70–99)
HCT VFR BLD CALC: 39.6 % (ref 40.7–50.3)
HEMOGLOBIN: 13.1 G/DL (ref 13–17)
INR BLD: 1
LIPASE: 55 U/L (ref 13–60)
MCH RBC QN AUTO: 31.9 PG (ref 25.2–33.5)
MCHC RBC AUTO-ENTMCNC: 33.1 G/DL (ref 28.4–34.8)
MCV RBC AUTO: 96.4 FL (ref 82.6–102.9)
NRBC AUTOMATED: 0 PER 100 WBC
PDW BLD-RTO: 14 % (ref 11.8–14.4)
PLATELET # BLD: 53 K/UL (ref 138–453)
PMV BLD AUTO: 11.2 FL (ref 8.1–13.5)
POTASSIUM SERPL-SCNC: 4.2 MMOL/L (ref 3.7–5.3)
PROTHROMBIN TIME: 11 SEC (ref 9–12)
RBC # BLD: 4.11 M/UL (ref 4.21–5.77)
SODIUM BLD-SCNC: 139 MMOL/L (ref 135–144)
TOTAL PROTEIN: 6.6 G/DL (ref 6.4–8.3)
WBC # BLD: 2.5 K/UL (ref 3.5–11.3)

## 2019-10-30 PROCEDURE — 36415 COLL VENOUS BLD VENIPUNCTURE: CPT

## 2019-10-30 PROCEDURE — 80053 COMPREHEN METABOLIC PANEL: CPT

## 2019-10-30 PROCEDURE — 82105 ALPHA-FETOPROTEIN SERUM: CPT

## 2019-10-30 PROCEDURE — 83690 ASSAY OF LIPASE: CPT

## 2019-10-30 PROCEDURE — 85027 COMPLETE CBC AUTOMATED: CPT

## 2019-10-30 PROCEDURE — 85610 PROTHROMBIN TIME: CPT

## 2020-02-29 ENCOUNTER — HOSPITAL ENCOUNTER (OUTPATIENT)
Age: 70
Discharge: HOME | End: 2020-02-29
Payer: MEDICARE

## 2020-02-29 LAB
CALCIUM IONIZED: 1.27 MMOL/L (ref 1.13–1.33)
PHOSPHORUS: 2.8 MG/DL (ref 2.5–4.5)
PTH INTACT: 18.58 PG/ML (ref 15–65)
VITAMIN D 25-HYDROXY: 51.8 NG/ML (ref 30–100)

## 2020-02-29 PROCEDURE — 82330 ASSAY OF CALCIUM: CPT

## 2020-02-29 PROCEDURE — 82306 VITAMIN D 25 HYDROXY: CPT

## 2020-02-29 PROCEDURE — 84100 ASSAY OF PHOSPHORUS: CPT

## 2020-02-29 PROCEDURE — 83970 ASSAY OF PARATHORMONE: CPT

## 2020-02-29 PROCEDURE — 36415 COLL VENOUS BLD VENIPUNCTURE: CPT

## 2020-05-11 ENCOUNTER — HOSPITAL ENCOUNTER (OUTPATIENT)
Age: 70
Discharge: HOME | End: 2020-05-11
Payer: MEDICARE

## 2020-05-11 LAB
ABSOLUTE EOS #: 0.08 K/UL (ref 0–0.44)
ABSOLUTE IMMATURE GRANULOCYTE: 0 K/UL (ref 0–0.3)
ABSOLUTE LYMPH #: 0.65 K/UL (ref 1.1–3.7)
ABSOLUTE MONO #: 0.35 K/UL (ref 0.1–1.2)
AFP: 2.9 UG/L
ALBUMIN SERPL-MCNC: 3.6 G/DL (ref 3.5–5.2)
ALBUMIN/GLOBULIN RATIO: 1.2 (ref 1–2.5)
ALP BLD-CCNC: 106 U/L (ref 40–129)
ALT SERPL-CCNC: 38 U/L (ref 5–41)
ANION GAP SERPL CALCULATED.3IONS-SCNC: 10 MMOL/L (ref 9–17)
AST SERPL-CCNC: 44 U/L
BASOPHILS # BLD: 0 % (ref 0–2)
BASOPHILS ABSOLUTE: 0 K/UL (ref 0–0.2)
BILIRUB SERPL-MCNC: 0.76 MG/DL (ref 0.3–1.2)
BILIRUBIN DIRECT: 0.24 MG/DL
BILIRUBIN, INDIRECT: 0.52 MG/DL (ref 0–1)
BUN BLDV-MCNC: 23 MG/DL (ref 8–23)
CALCIUM SERPL-MCNC: 9.2 MG/DL (ref 8.6–10.4)
CHLORIDE BLD-SCNC: 107 MMOL/L (ref 98–107)
CO2: 23 MMOL/L (ref 20–31)
CREAT SERPL-MCNC: 0.78 MG/DL (ref 0.7–1.2)
DIFFERENTIAL TYPE: ABNORMAL
EOSINOPHILS RELATIVE PERCENT: 3 % (ref 1–4)
GFR AFRICAN AMERICAN: >60 ML/MIN
GFR NON-AFRICAN AMERICAN: >60 ML/MIN
GFR SERPL CREATININE-BSD FRML MDRD: ABNORMAL ML/MIN/{1.73_M2}
GFR SERPL CREATININE-BSD FRML MDRD: ABNORMAL ML/MIN/{1.73_M2}
GLUCOSE BLD-MCNC: 136 MG/DL (ref 70–99)
HCT VFR BLD CALC: 37.7 % (ref 40.7–50.3)
HEMOGLOBIN: 12.6 G/DL (ref 13–17)
IMMATURE GRANULOCYTES: 0 %
INR BLD: 1
LYMPHOCYTES # BLD: 24 % (ref 24–43)
MCH RBC QN AUTO: 32.9 PG (ref 25.2–33.5)
MCHC RBC AUTO-ENTMCNC: 33.4 G/DL (ref 28.4–34.8)
MCV RBC AUTO: 98.4 FL (ref 82.6–102.9)
MONOCYTES # BLD: 13 % (ref 3–12)
MORPHOLOGY: NORMAL
NRBC AUTOMATED: 0 PER 100 WBC
PDW BLD-RTO: 13.1 % (ref 11.8–14.4)
PLATELET # BLD: ABNORMAL K/UL (ref 138–453)
PLATELET ESTIMATE: ABNORMAL
PLATELET, FLUORESCENCE: 54 K/UL (ref 138–453)
PLATELET, IMMATURE FRACTION: 3.9 % (ref 1.1–10.3)
PMV BLD AUTO: ABNORMAL FL (ref 8.1–13.5)
POTASSIUM SERPL-SCNC: 4.3 MMOL/L (ref 3.7–5.3)
PROTHROMBIN TIME: 10.2 SEC (ref 9–12)
RBC # BLD: 3.83 M/UL (ref 4.21–5.77)
RBC # BLD: ABNORMAL 10*6/UL
SEG NEUTROPHILS: 60 % (ref 36–65)
SEGMENTED NEUTROPHILS ABSOLUTE COUNT: 1.62 K/UL (ref 1.5–8.1)
SODIUM BLD-SCNC: 140 MMOL/L (ref 135–144)
TOTAL PROTEIN: 6.5 G/DL (ref 6.4–8.3)
WBC # BLD: 2.7 K/UL (ref 3.5–11.3)
WBC # BLD: ABNORMAL 10*3/UL

## 2020-05-11 PROCEDURE — 36415 COLL VENOUS BLD VENIPUNCTURE: CPT

## 2020-05-11 PROCEDURE — 80053 COMPREHEN METABOLIC PANEL: CPT

## 2020-05-11 PROCEDURE — 82105 ALPHA-FETOPROTEIN SERUM: CPT

## 2020-05-11 PROCEDURE — 85055 RETICULATED PLATELET ASSAY: CPT

## 2020-05-11 PROCEDURE — 85025 COMPLETE CBC W/AUTO DIFF WBC: CPT

## 2020-05-11 PROCEDURE — 85610 PROTHROMBIN TIME: CPT

## 2020-05-11 PROCEDURE — 82248 BILIRUBIN DIRECT: CPT

## 2020-05-14 ENCOUNTER — HOSPITAL ENCOUNTER (OUTPATIENT)
Dept: ULTRASOUND IMAGING | Age: 70
Discharge: HOME | End: 2020-05-16
Payer: MEDICARE

## 2020-05-14 PROCEDURE — 76705 ECHO EXAM OF ABDOMEN: CPT

## 2020-08-26 ENCOUNTER — HOSPITAL ENCOUNTER (OUTPATIENT)
Age: 70
Setting detail: SPECIMEN
Discharge: HOME | End: 2020-08-26
Payer: MEDICARE

## 2020-09-02 LAB — SURGICAL PATHOLOGY REPORT: NORMAL

## 2021-01-21 ENCOUNTER — HOSPITAL ENCOUNTER (OUTPATIENT)
Dept: ULTRASOUND IMAGING | Age: 71
Discharge: HOME | End: 2021-01-23
Payer: MEDICARE

## 2021-01-21 DIAGNOSIS — R10.9 ABDOMINAL PAIN, UNSPECIFIED ABDOMINAL LOCATION: ICD-10-CM

## 2021-01-25 ENCOUNTER — HOSPITAL ENCOUNTER (OUTPATIENT)
Dept: ULTRASOUND IMAGING | Age: 71
Discharge: HOME | End: 2021-01-27
Payer: MEDICARE

## 2021-01-25 DIAGNOSIS — K74.60 CIRRHOSIS OF LIVER WITHOUT ASCITES, UNSPECIFIED HEPATIC CIRRHOSIS TYPE (HCC): ICD-10-CM

## 2021-01-25 PROCEDURE — 76705 ECHO EXAM OF ABDOMEN: CPT

## 2021-03-06 ENCOUNTER — HOSPITAL ENCOUNTER (OUTPATIENT)
Age: 71
Discharge: HOME | End: 2021-03-06
Payer: MEDICARE

## 2021-03-06 LAB
AFP: 2.2 UG/L
ALBUMIN SERPL-MCNC: 3.6 G/DL (ref 3.5–5.2)
ALBUMIN/GLOBULIN RATIO: 1.3 (ref 1–2.5)
ALP BLD-CCNC: 169 U/L (ref 40–129)
ALT SERPL-CCNC: 31 U/L (ref 5–41)
ANION GAP SERPL CALCULATED.3IONS-SCNC: 9 MMOL/L (ref 9–17)
AST SERPL-CCNC: 37 U/L
BILIRUB SERPL-MCNC: 1.29 MG/DL (ref 0.3–1.2)
BUN BLDV-MCNC: 22 MG/DL (ref 8–23)
BUN/CREAT BLD: ABNORMAL (ref 9–20)
CALCIUM SERPL-MCNC: 8.8 MG/DL (ref 8.6–10.4)
CHLORIDE BLD-SCNC: 104 MMOL/L (ref 98–107)
CO2: 25 MMOL/L (ref 20–31)
CREAT SERPL-MCNC: 0.71 MG/DL (ref 0.7–1.2)
GFR AFRICAN AMERICAN: >60 ML/MIN
GFR NON-AFRICAN AMERICAN: >60 ML/MIN
GFR SERPL CREATININE-BSD FRML MDRD: ABNORMAL ML/MIN/{1.73_M2}
GFR SERPL CREATININE-BSD FRML MDRD: ABNORMAL ML/MIN/{1.73_M2}
GLUCOSE BLD-MCNC: 226 MG/DL (ref 70–99)
HCT VFR BLD CALC: 38.8 % (ref 40.7–50.3)
HEMOGLOBIN: 12.7 G/DL (ref 13–17)
INR BLD: 1
MCH RBC QN AUTO: 31.8 PG (ref 25.2–33.5)
MCHC RBC AUTO-ENTMCNC: 32.7 G/DL (ref 28.4–34.8)
MCV RBC AUTO: 97.2 FL (ref 82.6–102.9)
NRBC AUTOMATED: 0 PER 100 WBC
PDW BLD-RTO: 13.2 % (ref 11.8–14.4)
PLATELET # BLD: ABNORMAL K/UL (ref 138–453)
PLATELET, FLUORESCENCE: 47 K/UL (ref 138–453)
PLATELET, IMMATURE FRACTION: 3.2 % (ref 1.1–10.3)
PMV BLD AUTO: ABNORMAL FL (ref 8.1–13.5)
POTASSIUM SERPL-SCNC: 3.9 MMOL/L (ref 3.7–5.3)
PROTHROMBIN TIME: 11.1 SEC (ref 9.1–12.3)
RBC # BLD: 3.99 M/UL (ref 4.21–5.77)
SODIUM BLD-SCNC: 138 MMOL/L (ref 135–144)
TOTAL PROTEIN: 6.4 G/DL (ref 6.4–8.3)
WBC # BLD: 2 K/UL (ref 3.5–11.3)

## 2021-03-06 PROCEDURE — 80053 COMPREHEN METABOLIC PANEL: CPT

## 2021-03-06 PROCEDURE — 85055 RETICULATED PLATELET ASSAY: CPT

## 2021-03-06 PROCEDURE — 85027 COMPLETE CBC AUTOMATED: CPT

## 2021-03-06 PROCEDURE — 36415 COLL VENOUS BLD VENIPUNCTURE: CPT

## 2021-03-06 PROCEDURE — 82105 ALPHA-FETOPROTEIN SERUM: CPT

## 2021-03-06 PROCEDURE — 85610 PROTHROMBIN TIME: CPT

## 2021-04-06 ENCOUNTER — APPOINTMENT (OUTPATIENT)
Dept: GENERAL RADIOLOGY | Age: 71
DRG: 003 | End: 2021-04-06
Payer: MEDICARE

## 2021-04-06 ENCOUNTER — APPOINTMENT (OUTPATIENT)
Dept: CT IMAGING | Age: 71
DRG: 003 | End: 2021-04-06
Payer: MEDICARE

## 2021-04-06 ENCOUNTER — HOSPITAL ENCOUNTER (INPATIENT)
Age: 71
LOS: 11 days | Discharge: NURSING, REHAB OR LONG-TERM CARE | DRG: 003 | End: 2021-04-17
Attending: EMERGENCY MEDICINE | Admitting: SURGERY
Payer: MEDICARE

## 2021-04-06 DIAGNOSIS — I62.9 INTRACRANIAL HEMORRHAGE (HCC): ICD-10-CM

## 2021-04-06 DIAGNOSIS — J96.00 ACUTE RESPIRATORY FAILURE, UNSPECIFIED WHETHER WITH HYPOXIA OR HYPERCAPNIA (HCC): ICD-10-CM
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
## 2021-04-06 LAB
ALLEN TEST: ABNORMAL
ANION GAP SERPL CALCULATED.3IONS-SCNC: 8 MMOL/L (ref 9–17)
BLOOD BANK SPECIMEN: ABNORMAL
BUN BLDV-MCNC: 27 MG/DL (ref 8–23)
CARBOXYHEMOGLOBIN: 1.9 % (ref 0–5)
CHLORIDE BLD-SCNC: 104 MMOL/L (ref 98–107)
CO2: 25 MMOL/L (ref 20–31)
CREAT SERPL-MCNC: 0.75 MG/DL (ref 0.7–1.2)
ETHANOL PERCENT: <0.01 %
ETHANOL: <10 MG/DL
FIO2: ABNORMAL
GFR AFRICAN AMERICAN: ABNORMAL ML/MIN
GFR NON-AFRICAN AMERICAN: ABNORMAL ML/MIN
GFR SERPL CREATININE-BSD FRML MDRD: ABNORMAL ML/MIN/{1.73_M2}
GFR SERPL CREATININE-BSD FRML MDRD: ABNORMAL ML/MIN/{1.73_M2}
GLUCOSE BLD-MCNC: 209 MG/DL (ref 70–99)
HCG QUALITATIVE: ABNORMAL
HCO3 VENOUS: 24.7 MMOL/L (ref 24–30)
HCT VFR BLD CALC: 37.4 % (ref 40.7–50.3)
HEMOGLOBIN: 12.4 G/DL (ref 13–17)
INR BLD: 1.1
MCH RBC QN AUTO: 32.2 PG (ref 25.2–33.5)
MCHC RBC AUTO-ENTMCNC: 33.2 G/DL (ref 28.4–34.8)
MCV RBC AUTO: 97.1 FL (ref 82.6–102.9)
METHEMOGLOBIN: ABNORMAL % (ref 0–1.5)
MODE: ABNORMAL
NEGATIVE BASE EXCESS, VEN: 0.8 MMOL/L (ref 0–2)
NOTIFICATION TIME: ABNORMAL
NOTIFICATION: ABNORMAL
NRBC AUTOMATED: 0 PER 100 WBC
O2 DEVICE/FLOW/%: ABNORMAL
O2 SAT, VEN: 97 % (ref 60–85)
OXYHEMOGLOBIN: ABNORMAL % (ref 95–98)
PARTIAL THROMBOPLASTIN TIME: 20.9 SEC (ref 20.5–30.5)
PATIENT TEMP: 37
PCO2, VEN, TEMP ADJ: ABNORMAL MMHG (ref 39–55)
PCO2, VEN: 46.6 (ref 39–55)
PDW BLD-RTO: 13.4 % (ref 11.8–14.4)
PEEP/CPAP: ABNORMAL
PH VENOUS: 7.34 (ref 7.32–7.42)
PH, VEN, TEMP ADJ: ABNORMAL (ref 7.32–7.42)
PLATELET # BLD: ABNORMAL K/UL (ref 138–453)
PLATELET, FLUORESCENCE: 112 K/UL (ref 138–453)
PLATELET, IMMATURE FRACTION: 3.8 % (ref 1.1–10.3)
PMV BLD AUTO: ABNORMAL FL (ref 8.1–13.5)
PO2, VEN, TEMP ADJ: ABNORMAL MMHG (ref 30–50)
PO2, VEN: 96.6 (ref 30–50)
POSITIVE BASE EXCESS, VEN: ABNORMAL MMOL/L (ref 0–2)
POTASSIUM SERPL-SCNC: 4.5 MMOL/L (ref 3.7–5.3)
PROTHROMBIN TIME: 11.5 SEC (ref 9.1–12.3)
PSV: ABNORMAL
PT. POSITION: ABNORMAL
RBC # BLD: 3.85 M/UL (ref 4.21–5.77)
RESPIRATORY RATE: ABNORMAL
SAMPLE SITE: ABNORMAL
SARS-COV-2, RAPID: NOT DETECTED
SET RATE: ABNORMAL
SODIUM BLD-SCNC: 137 MMOL/L (ref 135–144)
SPECIMEN DESCRIPTION: NORMAL
TEXT FOR RESPIRATORY: ABNORMAL
TOTAL HB: ABNORMAL G/DL (ref 12–16)
TOTAL RATE: ABNORMAL
VT: ABNORMAL
WBC # BLD: 9.3 K/UL (ref 3.5–11.3)

## 2021-04-06 PROCEDURE — 70450 CT HEAD/BRAIN W/O DYE: CPT

## 2021-04-06 PROCEDURE — 94002 VENT MGMT INPAT INIT DAY: CPT

## 2021-04-06 PROCEDURE — 72170 X-RAY EXAM OF PELVIS: CPT

## 2021-04-06 PROCEDURE — 51702 INSERT TEMP BLADDER CATH: CPT

## 2021-04-06 PROCEDURE — 36556 INSERT NON-TUNNEL CV CATH: CPT

## 2021-04-06 PROCEDURE — 94003 VENT MGMT INPAT SUBQ DAY: CPT

## 2021-04-06 PROCEDURE — 31500 INSERT EMERGENCY AIRWAY: CPT

## 2021-04-06 PROCEDURE — 99291 CRITICAL CARE FIRST HOUR: CPT

## 2021-04-06 PROCEDURE — 86850 RBC ANTIBODY SCREEN: CPT

## 2021-04-06 PROCEDURE — 85610 PROTHROMBIN TIME: CPT

## 2021-04-06 PROCEDURE — 72125 CT NECK SPINE W/O DYE: CPT

## 2021-04-06 PROCEDURE — 82565 ASSAY OF CREATININE: CPT

## 2021-04-06 PROCEDURE — 99221 1ST HOSP IP/OBS SF/LOW 40: CPT | Performed by: ORTHOPAEDIC SURGERY

## 2021-04-06 PROCEDURE — 2000000000 HC ICU R&B

## 2021-04-06 PROCEDURE — 2500000003 HC RX 250 WO HCPCS: Performed by: STUDENT IN AN ORGANIZED HEALTH CARE EDUCATION/TRAINING PROGRAM

## 2021-04-06 PROCEDURE — 3209999900 CT LUMBAR SPINE TRAUMA RECONSTRUCTION

## 2021-04-06 PROCEDURE — 82947 ASSAY GLUCOSE BLOOD QUANT: CPT

## 2021-04-06 PROCEDURE — 85730 THROMBOPLASTIN TIME PARTIAL: CPT

## 2021-04-06 PROCEDURE — 85055 RETICULATED PLATELET ASSAY: CPT

## 2021-04-06 PROCEDURE — 94761 N-INVAS EAR/PLS OXIMETRY MLT: CPT

## 2021-04-06 PROCEDURE — G0390 TRAUMA RESPONS W/HOSP CRITI: HCPCS

## 2021-04-06 PROCEDURE — 82805 BLOOD GASES W/O2 SATURATION: CPT

## 2021-04-06 PROCEDURE — 2500000003 HC RX 250 WO HCPCS

## 2021-04-06 PROCEDURE — 80051 ELECTROLYTE PANEL: CPT

## 2021-04-06 PROCEDURE — 82803 BLOOD GASES ANY COMBINATION: CPT

## 2021-04-06 PROCEDURE — 84520 ASSAY OF UREA NITROGEN: CPT

## 2021-04-06 PROCEDURE — 71045 X-RAY EXAM CHEST 1 VIEW: CPT

## 2021-04-06 PROCEDURE — G0480 DRUG TEST DEF 1-7 CLASSES: HCPCS

## 2021-04-06 PROCEDURE — 6360000002 HC RX W HCPCS

## 2021-04-06 PROCEDURE — 37799 UNLISTED PX VASCULAR SURGERY: CPT

## 2021-04-06 PROCEDURE — 71260 CT THORAX DX C+: CPT

## 2021-04-06 PROCEDURE — 85027 COMPLETE CBC AUTOMATED: CPT

## 2021-04-06 PROCEDURE — 6360000004 HC RX CONTRAST MEDICATION: Performed by: STUDENT IN AN ORGANIZED HEALTH CARE EDUCATION/TRAINING PROGRAM

## 2021-04-06 PROCEDURE — 28470 CLTX METATARSAL FX WO MNP EA: CPT | Performed by: ORTHOPAEDIC SURGERY

## 2021-04-06 PROCEDURE — 86901 BLOOD TYPING SEROLOGIC RH(D): CPT

## 2021-04-06 PROCEDURE — 83605 ASSAY OF LACTIC ACID: CPT

## 2021-04-06 PROCEDURE — 02HV33Z INSERTION OF INFUSION DEVICE INTO SUPERIOR VENA CAVA, PERCUTANEOUS APPROACH: ICD-10-PCS | Performed by: SURGERY

## 2021-04-06 PROCEDURE — 3209999900 CT THORACIC SPINE TRAUMA RECONSTRUCTION

## 2021-04-06 PROCEDURE — 36620 INSERTION CATHETER ARTERY: CPT

## 2021-04-06 PROCEDURE — 99283 EMERGENCY DEPT VISIT LOW MDM: CPT

## 2021-04-06 PROCEDURE — 6810039000 HC L1 TRAUMA ALERT

## 2021-04-06 PROCEDURE — 2580000003 HC RX 258: Performed by: STUDENT IN AN ORGANIZED HEALTH CARE EDUCATION/TRAINING PROGRAM

## 2021-04-06 PROCEDURE — 2700000000 HC OXYGEN THERAPY PER DAY

## 2021-04-06 PROCEDURE — 87635 SARS-COV-2 COVID-19 AMP PRB: CPT

## 2021-04-06 PROCEDURE — 73630 X-RAY EXAM OF FOOT: CPT

## 2021-04-06 PROCEDURE — 6370000000 HC RX 637 (ALT 250 FOR IP): Performed by: STUDENT IN AN ORGANIZED HEALTH CARE EDUCATION/TRAINING PROGRAM

## 2021-04-06 PROCEDURE — 84703 CHORIONIC GONADOTROPIN ASSAY: CPT

## 2021-04-06 PROCEDURE — 70486 CT MAXILLOFACIAL W/O DYE: CPT

## 2021-04-06 PROCEDURE — 73552 X-RAY EXAM OF FEMUR 2/>: CPT

## 2021-04-06 PROCEDURE — 86900 BLOOD TYPING SEROLOGIC ABO: CPT

## 2021-04-06 PROCEDURE — 73560 X-RAY EXAM OF KNEE 1 OR 2: CPT

## 2021-04-06 PROCEDURE — 73610 X-RAY EXAM OF ANKLE: CPT

## 2021-04-06 RX ORDER — LEVETIRACETAM 10 MG/ML
1000 INJECTION INTRAVASCULAR ONCE
Status: COMPLETED | OUTPATIENT
Start: 2021-04-06 | End: 2021-04-07

## 2021-04-06 RX ORDER — LEVETIRACETAM 5 MG/ML
500 INJECTION INTRAVASCULAR EVERY 12 HOURS
Status: DISCONTINUED | OUTPATIENT
Start: 2021-04-07 | End: 2021-04-07

## 2021-04-06 RX ORDER — ONDANSETRON 2 MG/ML
4 INJECTION INTRAMUSCULAR; INTRAVENOUS EVERY 6 HOURS PRN
Status: DISCONTINUED | OUTPATIENT
Start: 2021-04-06 | End: 2021-04-17 | Stop reason: HOSPADM

## 2021-04-06 RX ORDER — POLYETHYLENE GLYCOL 3350 17 G/17G
17 POWDER, FOR SOLUTION ORAL DAILY PRN
Status: DISCONTINUED | OUTPATIENT
Start: 2021-04-06 | End: 2021-04-07

## 2021-04-06 RX ORDER — ACETAMINOPHEN 325 MG/1
650 TABLET ORAL EVERY 4 HOURS PRN
Status: DISCONTINUED | OUTPATIENT
Start: 2021-04-06 | End: 2021-04-07

## 2021-04-06 RX ORDER — FENTANYL CITRATE 50 UG/ML
INJECTION, SOLUTION INTRAMUSCULAR; INTRAVENOUS
Status: DISCONTINUED
Start: 2021-04-06 | End: 2021-04-07

## 2021-04-06 RX ORDER — CHLORHEXIDINE GLUCONATE 0.12 MG/ML
15 RINSE ORAL 2 TIMES DAILY
Status: DISCONTINUED | OUTPATIENT
Start: 2021-04-06 | End: 2021-04-17 | Stop reason: HOSPADM

## 2021-04-06 RX ORDER — PROPOFOL 10 MG/ML
INJECTION, EMULSION INTRAVENOUS
Status: DISCONTINUED
Start: 2021-04-06 | End: 2021-04-06

## 2021-04-06 RX ORDER — SODIUM CHLORIDE 0.9 % (FLUSH) 0.9 %
5-40 SYRINGE (ML) INJECTION EVERY 12 HOURS SCHEDULED
Status: DISCONTINUED | OUTPATIENT
Start: 2021-04-06 | End: 2021-04-17 | Stop reason: HOSPADM

## 2021-04-06 RX ORDER — PROPOFOL 10 MG/ML
5-50 INJECTION, EMULSION INTRAVENOUS
Status: DISCONTINUED | OUTPATIENT
Start: 2021-04-06 | End: 2021-04-14

## 2021-04-06 RX ORDER — LEVETIRACETAM 500 MG/1
500 TABLET ORAL 2 TIMES DAILY
Status: DISCONTINUED | OUTPATIENT
Start: 2021-04-07 | End: 2021-04-06

## 2021-04-06 RX ORDER — SODIUM CHLORIDE 9 MG/ML
25 INJECTION, SOLUTION INTRAVENOUS PRN
Status: DISCONTINUED | OUTPATIENT
Start: 2021-04-06 | End: 2021-04-14

## 2021-04-06 RX ORDER — POLYETHYLENE GLYCOL 3350 17 G/17G
17 POWDER, FOR SOLUTION ORAL DAILY
Status: DISCONTINUED | OUTPATIENT
Start: 2021-04-07 | End: 2021-04-07 | Stop reason: SDUPTHER

## 2021-04-06 RX ORDER — PROPOFOL 10 MG/ML
INJECTION, EMULSION INTRAVENOUS
Status: COMPLETED
Start: 2021-04-06 | End: 2021-04-06

## 2021-04-06 RX ORDER — DOCUSATE SODIUM 100 MG/1
100 CAPSULE, LIQUID FILLED ORAL DAILY
Status: DISCONTINUED | OUTPATIENT
Start: 2021-04-07 | End: 2021-04-12

## 2021-04-06 RX ORDER — SODIUM CHLORIDE 9 MG/ML
INJECTION, SOLUTION INTRAVENOUS CONTINUOUS
Status: DISCONTINUED | OUTPATIENT
Start: 2021-04-06 | End: 2021-04-14

## 2021-04-06 RX ORDER — SODIUM CHLORIDE 0.9 % (FLUSH) 0.9 %
5-40 SYRINGE (ML) INJECTION PRN
Status: DISCONTINUED | OUTPATIENT
Start: 2021-04-06 | End: 2021-04-17 | Stop reason: HOSPADM

## 2021-04-06 RX ADMIN — PROPOFOL 1000 MG: 10 INJECTION, EMULSION INTRAVENOUS at 22:47

## 2021-04-06 RX ADMIN — SODIUM CHLORIDE: 9 INJECTION, SOLUTION INTRAVENOUS at 22:47

## 2021-04-06 RX ADMIN — SODIUM CHLORIDE, PRESERVATIVE FREE 10 ML: 5 INJECTION INTRAVENOUS at 23:52

## 2021-04-06 RX ADMIN — FAMOTIDINE 20 MG: 10 INJECTION INTRAVENOUS at 23:51

## 2021-04-06 RX ADMIN — CHLORHEXIDINE GLUCONATE 0.12% ORAL RINSE 15 ML: 1.2 LIQUID ORAL at 23:52

## 2021-04-06 RX ADMIN — IOPAMIDOL 130 ML: 755 INJECTION, SOLUTION INTRAVENOUS at 20:05

## 2021-04-06 ASSESSMENT — PULMONARY FUNCTION TESTS
PIF_VALUE: 32
PIF_VALUE: 31
PIF_VALUE: 40

## 2021-04-06 NOTE — CONSULTS
status: Not on file    Intimate partner violence     Fear of current or ex partner: Not on file     Emotionally abused: Not on file     Physically abused: Not on file     Forced sexual activity: Not on file   Other Topics Concern    Not on file   Social History Narrative    Not on file       Family History:  No family history on file. REVIEW OF SYSTEMS:    Patient intubated and sedated    PHYSICAL EXAM:  Pulse 96   Resp 17   SpO2 99%     Gen: Intubated and sedated    Head: Normocephalic, lacerations, skull lac    Neck: C-collar    Chest: Non labored breathing, b/l clavicles, without crepitus, step off, or deformity    Cardiovascular: Regular rate, no dependent edema, distal pulses 2+     Respiratory: Chest symmetric, no accessory muscle use, normal respirations     Pelvis: Stable to anterior and lateral compression     RUE:  No ecchymosis or deformities. Skin intact. No crepitance noted. Unable to obtain neurovascular exam due to intubation and sedation. Radial pulse 2+ fingers warm and perfused. Hand and fingers warm and well-perfused w/ BCR; radial pulse 2+. LUE:  No ecchymosis or deformities. Skin intact. No crepitance noted. Unable to obtain neurovascular exam due to intubation and sedation. Radial pulse 2+ fingers warm and perfused. Hand and fingers warm and well-perfused w/ BCR; radial pulse 2+. RLE:  Deformity right thigh with crepitus. Skin intact. Unable to obtain neurovascular exam due to intubation and sedation. DP 2+, toes warm and perfused. No further crepitance noted throughout extremity. LLE: Ecchymosis to right ankle/foot with some grimacing to palpation. Crepitus to 5th metatarsal. No gross deformities. Skin intact. Compartments soft and compressible. -log roll. Knee ligaments grossly intact. Unable to obtain neurovascular exam due to intubation and sedation. DP 2+, toes warm and perfused. No further crepitance noted.       LABS:  Recent Labs     04/06/21  1939   WBC 9.3   HGB 12.4* transfer/repositioning but always reapply. Ensure that weight are not resting on edge of bed or floor.  Ortho team will manage pin sites    ----------------------------------------  Travis Roberts DO  PGY-3, Department of [address], [place], [place]

## 2021-04-06 NOTE — ED NOTES
Bed: TRAUMAA  Expected date: 4/6/21  Expected time: 7:14 PM  Means of arrival: [facility]  Comments:  LS Via Alex Sanchez, RN  04/06/21 0452
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
## 2021-04-07 ENCOUNTER — APPOINTMENT (OUTPATIENT)
Dept: GENERAL RADIOLOGY | Age: 71
DRG: 003 | End: 2021-04-07
Payer: MEDICARE

## 2021-04-07 ENCOUNTER — ANESTHESIA EVENT (OUTPATIENT)
Dept: OPERATING ROOM | Age: 71
DRG: 003 | End: 2021-04-07
Payer: MEDICARE

## 2021-04-07 ENCOUNTER — APPOINTMENT (OUTPATIENT)
Dept: CT IMAGING | Age: 71
DRG: 003 | End: 2021-04-07
Payer: MEDICARE

## 2021-04-07 PROBLEM — I60.9 SAH (SUBARACHNOID HEMORRHAGE) (HCC): Status: ACTIVE | Noted: 2021-04-07
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
## 2021-04-07 PROBLEM — J96.00 ACUTE RESPIRATORY FAILURE (HCC): Status: ACTIVE | Noted: 2021-04-07

## 2021-04-07 PROBLEM — S02.0XXA FRACTURE OF PARIETAL BONE (HCC): Status: ACTIVE | Noted: 2021-04-07

## 2021-04-07 PROBLEM — S06.33AA INTRAPARENCHYMAL HEMATOMA OF BRAIN DUE TO TRAUMA: Status: ACTIVE | Noted: 2021-04-07

## 2021-04-07 PROBLEM — S06.5XAA SDH (SUBDURAL HEMATOMA): Status: ACTIVE | Noted: 2021-04-07

## 2021-04-07 PROBLEM — S92.352A FRACTURE OF FIFTH METATARSAL BONE OF LEFT FOOT: Status: ACTIVE | Noted: 2021-04-07

## 2021-04-07 PROBLEM — S72.91XA FRACTURE OF RIGHT FEMUR (HCC): Status: ACTIVE | Noted: 2021-04-07

## 2021-04-07 LAB
ABSOLUTE EOS #: 0 K/UL (ref 0–0.44)
ABSOLUTE IMMATURE GRANULOCYTE: 0.12 K/UL (ref 0–0.3)
ABSOLUTE LYMPH #: 1.35 K/UL (ref 1.1–3.7)
ABSOLUTE MONO #: 1.72 K/UL (ref 0.1–1.2)
ACT TEG: 113 SEC (ref 86–118)
ALBUMIN SERPL-MCNC: 3.1 G/DL (ref 3.5–5.2)
ALBUMIN/GLOBULIN RATIO: 1.5 (ref 1–2.5)
ALLEN TEST: ABNORMAL
ALLEN TEST: ABNORMAL
ALP BLD-CCNC: 69 U/L (ref 40–129)
ALT SERPL-CCNC: 41 U/L (ref 5–41)
AMMONIA: 91 UMOL/L (ref 16–60)
ANGLE, RAPID TEG: 77 DEG (ref 64–80)
ANION GAP SERPL CALCULATED.3IONS-SCNC: 4 MMOL/L (ref 9–17)
ANION GAP SERPL CALCULATED.3IONS-SCNC: 6 MMOL/L (ref 9–17)
AST SERPL-CCNC: 62 U/L
BASOPHILS # BLD: 0 % (ref 0–2)
BASOPHILS ABSOLUTE: 0 K/UL (ref 0–0.2)
BILIRUB SERPL-MCNC: 1.66 MG/DL (ref 0.3–1.2)
BILIRUBIN DIRECT: 0.74 MG/DL
BILIRUBIN, INDIRECT: 0.92 MG/DL (ref 0–1)
BUN BLDV-MCNC: 32 MG/DL (ref 8–23)
BUN BLDV-MCNC: 33 MG/DL (ref 8–23)
BUN/CREAT BLD: ABNORMAL (ref 9–20)
BUN/CREAT BLD: ABNORMAL (ref 9–20)
CALCIUM IONIZED: 1.11 MMOL/L (ref 1.13–1.33)
CALCIUM SERPL-MCNC: 8.1 MG/DL (ref 8.6–10.4)
CALCIUM SERPL-MCNC: 8.4 MG/DL (ref 8.6–10.4)
CHLORIDE BLD-SCNC: 108 MMOL/L (ref 98–107)
CHLORIDE BLD-SCNC: 111 MMOL/L (ref 98–107)
CO2: 23 MMOL/L (ref 20–31)
CO2: 23 MMOL/L (ref 20–31)
CREAT SERPL-MCNC: 1.02 MG/DL (ref 0.7–1.2)
CREAT SERPL-MCNC: 1.22 MG/DL (ref 0.7–1.2)
DIFFERENTIAL TYPE: ABNORMAL
EOSINOPHILS RELATIVE PERCENT: 0 % (ref 1–4)
EPL-TEG: 0 % (ref 0–15)
ESTIMATED AVERAGE GLUCOSE: 151 MG/DL
FIO2: 100
FIO2: ABNORMAL
GFR AFRICAN AMERICAN: >60 ML/MIN
GFR AFRICAN AMERICAN: >60 ML/MIN
GFR NON-AFRICAN AMERICAN: 58 ML/MIN
GFR NON-AFRICAN AMERICAN: >60 ML/MIN
GFR SERPL CREATININE-BSD FRML MDRD: ABNORMAL ML/MIN/{1.73_M2}
GLOBULIN: ABNORMAL G/DL (ref 1.5–3.8)
GLUCOSE BLD-MCNC: 164 MG/DL (ref 75–110)
GLUCOSE BLD-MCNC: 181 MG/DL (ref 75–110)
GLUCOSE BLD-MCNC: 199 MG/DL (ref 70–99)
GLUCOSE BLD-MCNC: 206 MG/DL (ref 70–99)
GLUCOSE BLD-MCNC: 211 MG/DL (ref 74–100)
HBA1C MFR BLD: 6.9 % (ref 4–6)
HCT VFR BLD CALC: 31.5 % (ref 40.7–50.3)
HEMOGLOBIN: 10.4 G/DL (ref 13–17)
HEPARIN THERAPY: NORMAL
IMMATURE GRANULOCYTES: 1 %
KINETICS RAPID TEG: 1.2 MIN (ref 1–2)
LY30 (LYSIS) TEG: 0 % (ref 0–8)
LYMPHOCYTES # BLD: 11 % (ref 24–43)
MA(MAX CLOT) RAPID TEG: 66 MM (ref 52–71)
MAGNESIUM: 1.7 MG/DL (ref 1.6–2.6)
MCH RBC QN AUTO: 32.1 PG (ref 25.2–33.5)
MCHC RBC AUTO-ENTMCNC: 33 G/DL (ref 28.4–34.8)
MCV RBC AUTO: 97.2 FL (ref 82.6–102.9)
MODE: ABNORMAL
MODE: ABNORMAL
MONOCYTES # BLD: 14 % (ref 3–12)
MORPHOLOGY: NORMAL
MRSA, DNA, NASAL: NORMAL
NEGATIVE BASE EXCESS, ART: ABNORMAL (ref 0–2)
NEGATIVE BASE EXCESS, ART: ABNORMAL (ref 0–2)
NRBC AUTOMATED: 0 PER 100 WBC
O2 DEVICE/FLOW/%: ABNORMAL
O2 DEVICE/FLOW/%: ABNORMAL
PATIENT TEMP: ABNORMAL
PATIENT TEMP: ABNORMAL
PDW BLD-RTO: 13.6 % (ref 11.8–14.4)
PHOSPHORUS: 3.1 MG/DL (ref 2.5–4.5)
PLATELET # BLD: 104 K/UL (ref 138–453)
PLATELET ESTIMATE: ABNORMAL
PMV BLD AUTO: 10.5 FL (ref 8.1–13.5)
POC HCO3: 24.2 MMOL/L (ref 21–28)
POC HCO3: 28.3 MMOL/L (ref 21–28)
POC LACTIC ACID: 1.55 MMOL/L (ref 0.56–1.39)
POC LACTIC ACID: 1.8 MMOL/L (ref 0.56–1.39)
POC O2 SATURATION: 100 % (ref 94–98)
POC O2 SATURATION: 100 % (ref 94–98)
POC PCO2 TEMP: ABNORMAL MM HG
POC PCO2 TEMP: ABNORMAL MM HG
POC PCO2: 37.1 MM HG (ref 35–48)
POC PCO2: 53.6 MM HG (ref 35–48)
POC PH TEMP: ABNORMAL
POC PH TEMP: ABNORMAL
POC PH: 7.33 (ref 7.35–7.45)
POC PH: 7.42 (ref 7.35–7.45)
POC PO2 TEMP: ABNORMAL MM HG
POC PO2 TEMP: ABNORMAL MM HG
POC PO2: 178.7 MM HG (ref 83–108)
POC PO2: 535.5 MM HG (ref 83–108)
POSITIVE BASE EXCESS, ART: 0 (ref 0–3)
POSITIVE BASE EXCESS, ART: 2 (ref 0–3)
POTASSIUM SERPL-SCNC: 5 MMOL/L (ref 3.7–5.3)
POTASSIUM SERPL-SCNC: 5.6 MMOL/L (ref 3.7–5.3)
RBC # BLD: 3.24 M/UL (ref 4.21–5.77)
RBC # BLD: ABNORMAL 10*6/UL
REACTION TIME RAPID TEG: 0.7 MIN (ref 0–1)
SAMPLE SITE: ABNORMAL
SAMPLE SITE: ABNORMAL
SEG NEUTROPHILS: 74 % (ref 36–65)
SEGMENTED NEUTROPHILS ABSOLUTE COUNT: 9.11 K/UL (ref 1.5–8.1)
SODIUM BLD-SCNC: 135 MMOL/L (ref 135–144)
SODIUM BLD-SCNC: 140 MMOL/L (ref 135–144)
SPECIMEN DESCRIPTION: NORMAL
TCO2 (CALC), ART: 25 MMOL/L (ref 22–29)
TCO2 (CALC), ART: 30 MMOL/L (ref 22–29)
TEG COMMENT: NORMAL
TOTAL PROTEIN: 5.2 G/DL (ref 6.4–8.3)
WBC # BLD: 12.3 K/UL (ref 3.5–11.3)
WBC # BLD: ABNORMAL 10*3/UL

## 2021-04-07 PROCEDURE — 6360000002 HC RX W HCPCS: Performed by: STUDENT IN AN ORGANIZED HEALTH CARE EDUCATION/TRAINING PROGRAM

## 2021-04-07 PROCEDURE — 73130 X-RAY EXAM OF HAND: CPT

## 2021-04-07 PROCEDURE — 83735 ASSAY OF MAGNESIUM: CPT

## 2021-04-07 PROCEDURE — 85210 CLOT FACTOR II PROTHROM SPEC: CPT

## 2021-04-07 PROCEDURE — 82140 ASSAY OF AMMONIA: CPT

## 2021-04-07 PROCEDURE — 94002 VENT MGMT INPAT INIT DAY: CPT

## 2021-04-07 PROCEDURE — 6370000000 HC RX 637 (ALT 250 FOR IP): Performed by: STUDENT IN AN ORGANIZED HEALTH CARE EDUCATION/TRAINING PROGRAM

## 2021-04-07 PROCEDURE — 2580000003 HC RX 258: Performed by: STUDENT IN AN ORGANIZED HEALTH CARE EDUCATION/TRAINING PROGRAM

## 2021-04-07 PROCEDURE — 82330 ASSAY OF CALCIUM: CPT

## 2021-04-07 PROCEDURE — 6370000000 HC RX 637 (ALT 250 FOR IP): Performed by: NURSE PRACTITIONER

## 2021-04-07 PROCEDURE — 94640 AIRWAY INHALATION TREATMENT: CPT

## 2021-04-07 PROCEDURE — 82803 BLOOD GASES ANY COMBINATION: CPT

## 2021-04-07 PROCEDURE — 71045 X-RAY EXAM CHEST 1 VIEW: CPT

## 2021-04-07 PROCEDURE — 87641 MR-STAPH DNA AMP PROBE: CPT

## 2021-04-07 PROCEDURE — 37799 UNLISTED PX VASCULAR SURGERY: CPT

## 2021-04-07 PROCEDURE — 82947 ASSAY GLUCOSE BLOOD QUANT: CPT

## 2021-04-07 PROCEDURE — 94761 N-INVAS EAR/PLS OXIMETRY MLT: CPT

## 2021-04-07 PROCEDURE — 73110 X-RAY EXAM OF WRIST: CPT

## 2021-04-07 PROCEDURE — 94003 VENT MGMT INPAT SUBQ DAY: CPT

## 2021-04-07 PROCEDURE — 85390 FIBRINOLYSINS SCREEN I&R: CPT

## 2021-04-07 PROCEDURE — 83036 HEMOGLOBIN GLYCOSYLATED A1C: CPT

## 2021-04-07 PROCEDURE — 2500000003 HC RX 250 WO HCPCS: Performed by: STUDENT IN AN ORGANIZED HEALTH CARE EDUCATION/TRAINING PROGRAM

## 2021-04-07 PROCEDURE — 84100 ASSAY OF PHOSPHORUS: CPT

## 2021-04-07 PROCEDURE — 70450 CT HEAD/BRAIN W/O DYE: CPT

## 2021-04-07 PROCEDURE — 5A1955Z RESPIRATORY VENTILATION, GREATER THAN 96 CONSECUTIVE HOURS: ICD-10-PCS | Performed by: STUDENT IN AN ORGANIZED HEALTH CARE EDUCATION/TRAINING PROGRAM

## 2021-04-07 PROCEDURE — 2000000000 HC ICU R&B

## 2021-04-07 PROCEDURE — 73080 X-RAY EXAM OF ELBOW: CPT

## 2021-04-07 PROCEDURE — 80076 HEPATIC FUNCTION PANEL: CPT

## 2021-04-07 PROCEDURE — 80048 BASIC METABOLIC PNL TOTAL CA: CPT

## 2021-04-07 PROCEDURE — 2700000000 HC OXYGEN THERAPY PER DAY

## 2021-04-07 PROCEDURE — 85025 COMPLETE CBC W/AUTO DIFF WBC: CPT

## 2021-04-07 PROCEDURE — 2500000003 HC RX 250 WO HCPCS

## 2021-04-07 PROCEDURE — 51702 INSERT TEMP BLADDER CATH: CPT

## 2021-04-07 PROCEDURE — 0BH18EZ INSERTION OF ENDOTRACHEAL AIRWAY INTO TRACHEA, VIA NATURAL OR ARTIFICIAL OPENING ENDOSCOPIC: ICD-10-PCS | Performed by: STUDENT IN AN ORGANIZED HEALTH CARE EDUCATION/TRAINING PROGRAM

## 2021-04-07 RX ORDER — SODIUM CHLORIDE, SODIUM LACTATE, POTASSIUM CHLORIDE, CALCIUM CHLORIDE 600; 310; 30; 20 MG/100ML; MG/100ML; MG/100ML; MG/100ML
INJECTION, SOLUTION INTRAVENOUS CONTINUOUS
Status: CANCELLED | OUTPATIENT
Start: 2021-04-07

## 2021-04-07 RX ORDER — SPIRONOLACTONE 25 MG/1
50 TABLET ORAL DAILY
Status: DISCONTINUED | OUTPATIENT
Start: 2021-04-07 | End: 2021-04-17 | Stop reason: HOSPADM

## 2021-04-07 RX ORDER — FERROUS SULFATE 325(65) MG
325 TABLET ORAL
COMMUNITY

## 2021-04-07 RX ORDER — DEXTROSE MONOHYDRATE 50 MG/ML
100 INJECTION, SOLUTION INTRAVENOUS PRN
Status: DISCONTINUED | OUTPATIENT
Start: 2021-04-07 | End: 2021-04-07

## 2021-04-07 RX ORDER — LACTULOSE 10 G/10G
10 SOLUTION ORAL 2 TIMES DAILY
Status: ON HOLD | COMMUNITY
End: 2021-04-16 | Stop reason: HOSPADM

## 2021-04-07 RX ORDER — LEVETIRACETAM 100 MG/ML
500 SOLUTION ORAL 2 TIMES DAILY
Status: DISCONTINUED | OUTPATIENT
Start: 2021-04-07 | End: 2021-04-14

## 2021-04-07 RX ORDER — SPIRONOLACTONE 50 MG/1
50 TABLET, FILM COATED ORAL DAILY
COMMUNITY

## 2021-04-07 RX ORDER — POLYETHYLENE GLYCOL 3350 17 G/17G
17 POWDER, FOR SOLUTION ORAL DAILY
Status: DISCONTINUED | OUTPATIENT
Start: 2021-04-07 | End: 2021-04-12

## 2021-04-07 RX ORDER — AMLODIPINE BESYLATE 5 MG/1
5 TABLET ORAL DAILY
COMMUNITY

## 2021-04-07 RX ORDER — 0.9 % SODIUM CHLORIDE 0.9 %
500 INTRAVENOUS SOLUTION INTRAVENOUS ONCE
Status: COMPLETED | OUTPATIENT
Start: 2021-04-07 | End: 2021-04-07

## 2021-04-07 RX ORDER — NICOTINE POLACRILEX 4 MG
15 LOZENGE BUCCAL PRN
Status: DISCONTINUED | OUTPATIENT
Start: 2021-04-07 | End: 2021-04-07

## 2021-04-07 RX ORDER — ACETAMINOPHEN 160 MG/5ML
1000 SOLUTION ORAL EVERY 8 HOURS
Status: DISCONTINUED | OUTPATIENT
Start: 2021-04-07 | End: 2021-04-17 | Stop reason: HOSPADM

## 2021-04-07 RX ORDER — LIDOCAINE HYDROCHLORIDE 10 MG/ML
INJECTION, SOLUTION INFILTRATION; PERINEURAL
Status: COMPLETED
Start: 2021-04-07 | End: 2021-04-07

## 2021-04-07 RX ORDER — GINSENG 100 MG
CAPSULE ORAL PRN
Status: DISCONTINUED | OUTPATIENT
Start: 2021-04-07 | End: 2021-04-07

## 2021-04-07 RX ORDER — LIDOCAINE HYDROCHLORIDE AND EPINEPHRINE 10; 10 MG/ML; UG/ML
20 INJECTION, SOLUTION INFILTRATION; PERINEURAL ONCE
Status: COMPLETED | OUTPATIENT
Start: 2021-04-07 | End: 2021-04-07

## 2021-04-07 RX ORDER — TRAZODONE HYDROCHLORIDE 100 MG/1
100 TABLET ORAL NIGHTLY
COMMUNITY

## 2021-04-07 RX ORDER — LACTULOSE 10 G/15ML
10 SOLUTION ORAL 2 TIMES DAILY
Status: DISCONTINUED | OUTPATIENT
Start: 2021-04-07 | End: 2021-04-08

## 2021-04-07 RX ORDER — DEXTROSE MONOHYDRATE 25 G/50ML
12.5 INJECTION, SOLUTION INTRAVENOUS PRN
Status: DISCONTINUED | OUTPATIENT
Start: 2021-04-07 | End: 2021-04-14

## 2021-04-07 RX ORDER — FUROSEMIDE 20 MG/1
20 TABLET ORAL 2 TIMES DAILY
Status: DISCONTINUED | OUTPATIENT
Start: 2021-04-07 | End: 2021-04-17 | Stop reason: HOSPADM

## 2021-04-07 RX ORDER — GINSENG 100 MG
CAPSULE ORAL 2 TIMES DAILY
Status: DISCONTINUED | OUTPATIENT
Start: 2021-04-07 | End: 2021-04-17 | Stop reason: HOSPADM

## 2021-04-07 RX ORDER — LOSARTAN POTASSIUM 100 MG/1
100 TABLET ORAL DAILY
Status: ON HOLD | COMMUNITY
End: 2021-04-16 | Stop reason: HOSPADM

## 2021-04-07 RX ORDER — OMEPRAZOLE 20 MG/1
20 CAPSULE, DELAYED RELEASE ORAL DAILY
Status: ON HOLD | COMMUNITY
End: 2021-04-16 | Stop reason: HOSPADM

## 2021-04-07 RX ORDER — IPRATROPIUM BROMIDE AND ALBUTEROL SULFATE 2.5; .5 MG/3ML; MG/3ML
1 SOLUTION RESPIRATORY (INHALATION) EVERY 4 HOURS
Status: DISCONTINUED | OUTPATIENT
Start: 2021-04-07 | End: 2021-04-14

## 2021-04-07 RX ORDER — FUROSEMIDE 20 MG/1
20 TABLET ORAL 2 TIMES DAILY
Status: ON HOLD | COMMUNITY
End: 2021-04-16 | Stop reason: HOSPADM

## 2021-04-07 RX ADMIN — INSULIN LISPRO 3 UNITS: 100 INJECTION, SOLUTION INTRAVENOUS; SUBCUTANEOUS at 16:14

## 2021-04-07 RX ADMIN — SODIUM CHLORIDE 125 ML/HR: 9 INJECTION, SOLUTION INTRAVENOUS at 19:47

## 2021-04-07 RX ADMIN — ACETAMINOPHEN 1000 MG: 650 SOLUTION ORAL at 09:04

## 2021-04-07 RX ADMIN — IPRATROPIUM BROMIDE AND ALBUTEROL SULFATE 1 AMPULE: .5; 3 SOLUTION RESPIRATORY (INHALATION) at 15:33

## 2021-04-07 RX ADMIN — SODIUM CHLORIDE, PRESERVATIVE FREE 10 ML: 5 INJECTION INTRAVENOUS at 08:31

## 2021-04-07 RX ADMIN — SODIUM CHLORIDE 500 ML: 9 INJECTION, SOLUTION INTRAVENOUS at 04:40

## 2021-04-07 RX ADMIN — LIDOCAINE HYDROCHLORIDE AND EPINEPHRINE 20 ML: 10; 10 INJECTION, SOLUTION INFILTRATION; PERINEURAL at 01:09

## 2021-04-07 RX ADMIN — LACTULOSE 10 G: 20 SOLUTION ORAL at 20:39

## 2021-04-07 RX ADMIN — PROPOFOL 15 MCG/KG/MIN: 10 INJECTION, EMULSION INTRAVENOUS at 20:40

## 2021-04-07 RX ADMIN — LEVETIRACETAM 1000 MG: 10 INJECTION INTRAVENOUS at 04:29

## 2021-04-07 RX ADMIN — SODIUM CHLORIDE 500 ML: 9 INJECTION, SOLUTION INTRAVENOUS at 03:28

## 2021-04-07 RX ADMIN — SPIRONOLACTONE 50 MG: 25 TABLET ORAL at 16:26

## 2021-04-07 RX ADMIN — SODIUM CHLORIDE, PRESERVATIVE FREE 10 ML: 5 INJECTION INTRAVENOUS at 20:40

## 2021-04-07 RX ADMIN — INSULIN LISPRO 3 UNITS: 100 INJECTION, SOLUTION INTRAVENOUS; SUBCUTANEOUS at 12:09

## 2021-04-07 RX ADMIN — FAMOTIDINE 20 MG: 10 INJECTION INTRAVENOUS at 20:39

## 2021-04-07 RX ADMIN — LEVETIRACETAM 500 MG: 100 SOLUTION ORAL at 20:39

## 2021-04-07 RX ADMIN — INSULIN LISPRO 6 UNITS: 100 INJECTION, SOLUTION INTRAVENOUS; SUBCUTANEOUS at 04:18

## 2021-04-07 RX ADMIN — BACITRACIN: 500 OINTMENT TOPICAL at 20:54

## 2021-04-07 RX ADMIN — INSULIN LISPRO 3 UNITS: 100 INJECTION, SOLUTION INTRAVENOUS; SUBCUTANEOUS at 20:39

## 2021-04-07 RX ADMIN — ACETAMINOPHEN 1000 MG: 650 SOLUTION ORAL at 16:09

## 2021-04-07 RX ADMIN — IPRATROPIUM BROMIDE AND ALBUTEROL SULFATE 1 AMPULE: .5; 3 SOLUTION RESPIRATORY (INHALATION) at 23:25

## 2021-04-07 RX ADMIN — FUROSEMIDE 20 MG: 20 TABLET ORAL at 20:40

## 2021-04-07 RX ADMIN — FUROSEMIDE 20 MG: 20 TABLET ORAL at 16:08

## 2021-04-07 RX ADMIN — BACITRACIN: 500 OINTMENT TOPICAL at 14:00

## 2021-04-07 RX ADMIN — FAMOTIDINE 20 MG: 10 INJECTION INTRAVENOUS at 08:38

## 2021-04-07 RX ADMIN — PROPOFOL 20 MCG/KG/MIN: 10 INJECTION, EMULSION INTRAVENOUS at 05:12

## 2021-04-07 RX ADMIN — LIDOCAINE HYDROCHLORIDE: 10 INJECTION, SOLUTION INFILTRATION; PERINEURAL at 00:38

## 2021-04-07 RX ADMIN — IPRATROPIUM BROMIDE AND ALBUTEROL SULFATE 1 AMPULE: .5; 3 SOLUTION RESPIRATORY (INHALATION) at 19:55

## 2021-04-07 RX ADMIN — POLYETHYLENE GLYCOL 3350 17 G: 17 POWDER, FOR SOLUTION ORAL at 08:33

## 2021-04-07 RX ADMIN — INSULIN LISPRO 3 UNITS: 100 INJECTION, SOLUTION INTRAVENOUS; SUBCUTANEOUS at 08:33

## 2021-04-07 RX ADMIN — CHLORHEXIDINE GLUCONATE 0.12% ORAL RINSE 15 ML: 1.2 LIQUID ORAL at 20:54

## 2021-04-07 RX ADMIN — LACTULOSE 10 G: 20 SOLUTION ORAL at 16:08

## 2021-04-07 ASSESSMENT — PULMONARY FUNCTION TESTS
PIF_VALUE: 36
PIF_VALUE: 24
PIF_VALUE: 21
PIF_VALUE: 32
PIF_VALUE: 23
PIF_VALUE: 27
PIF_VALUE: 24
PIF_VALUE: 22
PIF_VALUE: 27
PIF_VALUE: 28
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
## 2021-04-07 NOTE — PROGRESS NOTES
C- Spine Evaluation for Spine Clearance:    Pt is a 66 y.o. male who was admitted on 4/6/2021 s/p [facility]. C-Spine precautions of C-collar with spinal neutrality maintained since arrival with current exam directed at further evaluation of spine for clearance purposes. Pt chart and current images reviewed. CT C-Spine negative for acute fracture, subluxation, or traumatic injury. Patient does not have a distracting injury, is not acutely intoxicated and is alert, oriented and fully able to participate in exam.      Pt denies c-spine pain while resting in c-collar. C-collar removed w/ c-spine neutrality maintained. Pt denies midline pain with palpation of spinous processes and axial loading. Pt demonstrated full flexion, extension, and SB ROM without complaints of pain. TLS precautions of supine position maintained since arrival.  Pt denies midline pain with palpation of spinous processes. CT dorsal lumbar negative for acute fracture, subluxation, or traumatic injury. C-spine is considered cleared w/out need for further imaging, evaluation, or continuation of c-collar. TLS considered clear w/out need for further imagine, evaluation, or continuation of supine bedrest precautions.     Electronically signed by Cathleen Beltran MD on 4/7/2021 at 2:42 AM

## 2021-04-07 NOTE — PLAN OF CARE
Ongoing     Problem: Falls - Risk of:  Goal: Will remain free from falls  Description: Will remain free from falls  4/7/2021 1236 by Moira Goodwin RN  Outcome: Ongoing  4/7/2021 0652 by Rita Cowan RN  Outcome: Ongoing  Goal: Absence of physical injury  Description: Absence of physical injury  4/7/2021 1236 by Moira Goodwin RN  Outcome: Ongoing  4/7/2021 0652 by Rita Cowan RN  Outcome: Ongoing     Problem: Confusion - Acute:  Goal: Absence of continued neurological deterioration signs and symptoms  Description: Absence of continued neurological deterioration signs and symptoms  4/7/2021 1236 by Moira Goodwin RN  Outcome: Ongoing  4/7/2021 0652 by Rita Cowan RN  Outcome: Ongoing  Goal: Mental status will be restored to baseline  Description: Mental status will be restored to baseline  4/7/2021 1236 by Moira Goodwin RN  Outcome: Ongoing  4/7/2021 0652 by Rita Cowan RN  Outcome: Ongoing     Problem: Discharge Planning:  Goal: Ability to perform activities of daily living will improve  Description: Ability to perform activities of daily living will improve  4/7/2021 1236 by Moira Goodwin RN  Outcome: Ongoing  4/7/2021 0652 by Rita Cowan RN  Outcome: Ongoing  Goal: Participates in care planning  Description: Participates in care planning  4/7/2021 1236 by Moira Goodwin RN  Outcome: Ongoing  4/7/2021 0652 by Rita Cowan RN  Outcome: Ongoing  Goal: Discharged to appropriate level of care  Description: Discharged to appropriate level of care  4/7/2021 1236 by Moira Goodwin RN  Outcome: Ongoing  4/7/2021 0652 by Rita Cowan RN  Outcome: Ongoing     Problem: Injury - Risk of, Physical Injury:  Goal: Will remain free from falls  Description: Will remain free from falls  4/7/2021 1236 by Moira Goodwin RN  Outcome: Ongoing  4/7/2021 0652 by Rita Cowan RN  Outcome: Ongoing  Goal: Absence of physical injury  Description: Absence of physical injury  4/7/2021 1236 by Moira Goodiwn RN  Outcome: Ongoing  4/7/2021 0652 by Lucy Multani RN  Outcome: Ongoing     Problem: Mood - Altered:  Goal: Mood stable  Description: Mood stable  4/7/2021 1236 by Ciarra Putnam RN  Outcome: Ongoing  4/7/2021 0652 by Lucy Multani RN  Outcome: Ongoing  Goal: Absence of abusive behavior  Description: Absence of abusive behavior  4/7/2021 1236 by Ciarra Putnam RN  Outcome: Ongoing  4/7/2021 0652 by Lucy Multani RN  Outcome: Ongoing  Goal: Verbalizations of feeling emotionally comfortable while being cared for will increase  Description: Verbalizations of feeling emotionally comfortable while being cared for will increase  4/7/2021 1236 by Ciarra Putnam RN  Outcome: Ongoing  4/7/2021 0652 by Lucy Multani RN  Outcome: Ongoing     Problem: Psychomotor Activity - Altered:  Goal: Absence of psychomotor disturbance signs and symptoms  Description: Absence of psychomotor disturbance signs and symptoms  4/7/2021 1236 by Ciarra Putnam RN  Outcome: Ongoing  4/7/2021 0652 by Lucy Multani RN  Outcome: Ongoing     Problem: Sensory Perception - Impaired:  Goal: Demonstrations of improved sensory functioning will increase  Description: Demonstrations of improved sensory functioning will increase  4/7/2021 1236 by Ciarra Putnam RN  Outcome: Ongoing  4/7/2021 0652 by Lucy Multani RN  Outcome: Ongoing  Goal: Decrease in sensory misperception frequency  Description: Decrease in sensory misperception frequency  4/7/2021 1236 by Ciarra Putnam RN  Outcome: Ongoing  4/7/2021 0652 by Lucy Multani RN  Outcome: Ongoing  Goal: Able to refrain from responding to false sensory perceptions  Description: Able to refrain from responding to false sensory perceptions  4/7/2021 1236 by Ciarra Putnam RN  Outcome: Ongoing  4/7/2021 0652 by Lucy Multani RN  Outcome: Ongoing  Goal: Demonstrates accurate environmental perceptions  Description: Demonstrates accurate environmental perceptions  4/7/2021 1236 by Ciarra Putnam RN  Outcome: Ongoing  4/7/2021 0652 by Gui Tierney RN  Outcome: Ongoing  Goal: Able to distinguish between reality-based and nonreality-based thinking  Description: Able to distinguish between reality-based and nonreality-based thinking  4/7/2021 1236 by Mulu Perea RN  Outcome: Ongoing  4/7/2021 0652 by Gui Tierney RN  Outcome: Ongoing  Goal: Able to interrupt nonreality-based thinking  Description: Able to interrupt nonreality-based thinking  4/7/2021 1236 by Mulu Perea RN  Outcome: Ongoing  4/7/2021 0652 by Gui Tierney RN  Outcome: Ongoing     Problem: Sleep Pattern Disturbance:  Goal: Appears well-rested  Description: Appears well-rested  4/7/2021 1236 by Mulu Perea RN  Outcome: Ongoing  4/7/2021 0652 by Gui Tierney RN  Outcome: Ongoing     Problem: Skin Integrity:  Goal: Will show no infection signs and symptoms  Description: Will show no infection signs and symptoms  4/7/2021 1236 by Mulu Perea RN  Outcome: Ongoing  4/7/2021 0756 by aKtey Deleon RCP  Outcome: Ongoing  4/7/2021 0652 by Gui Tierney RN  Outcome: Ongoing  Goal: Absence of new skin breakdown  Description: Absence of new skin breakdown  4/7/2021 1236 by Mulu Perea RN  Outcome: Ongoing  4/7/2021 0756 by Katey Deleon RCP  Outcome: Ongoing  4/7/2021 0652 by Gui Tierney RN  Outcome: Ongoing     Problem: Infection - Surgical Site:  Goal: Will show no infection signs and symptoms  Description: Will show no infection signs and symptoms  4/7/2021 1236 by Mulu Perea RN  Outcome: Ongoing  4/7/2021 0652 by Gui Tierney RN  Outcome: Ongoing     Problem: Injury - Risk of, Postfracture Complications:  Goal: Absence of fat embolism  Description: Absence of fat embolism  4/7/2021 1236 by Mulu Perea RN  Outcome: Ongoing  4/7/2021 0652 by Gui Tierney RN  Outcome: Ongoing  Goal: Absence of compartment syndrome signs and symptoms  Description: Absence of compartment syndrome signs and symptoms  4/7/2021 1236 by Brea Odonnell RN  Outcome: Ongoing  4/7/2021 0652 by Ciro Llanos RN  Outcome: Ongoing     Problem: Mobility - Impaired:  Goal: Mobility will improve to maximum level  Description: Mobility will improve to maximum level  4/7/2021 1236 by Brea Odonnell RN  Outcome: Ongoing  4/7/2021 0652 by Ciro Llanos RN  Outcome: Ongoing     Problem: Pain - Acute:  Goal: Pain level will decrease  Description: Pain level will decrease  4/7/2021 1236 by Brea Odonnell RN  Outcome: Ongoing  4/7/2021 0652 by Ciro Llanos RN  Outcome: Ongoing     Problem: Venous Thromboembolism:  Goal: Absence of deep vein thrombosis  Description: Absence of deep vein thrombosis  4/7/2021 1236 by Brea Odonnell RN  Outcome: Ongoing  4/7/2021 0652 by Ciro Llanos RN  Outcome: Ongoing  Goal: Absence of signs or symptoms of impaired coagulation  Description: Absence of signs or symptoms of impaired coagulation  4/7/2021 1236 by Brea Odonnell RN  Outcome: Ongoing  4/7/2021 0652 by Ciro Llanos RN  Outcome: Ongoing  Goal: Will show no signs or symptoms of venous thromboembolism  Description: Will show no signs or symptoms of venous thromboembolism  4/7/2021 1236 by Brea Odonnell RN  Outcome: Ongoing  4/7/2021 0652 by Ciro Llanos RN  Outcome: Ongoing     Problem: Nutrition  Goal: Optimal nutrition therapy  Description: Nutrition Problem #1: Inadequate oral intake  Intervention: Food and/or Nutrient Delivery: (Start nutrition as able.  If TF, suggest Immune Enhancing formula with goal rate of 60 mL/hr to provide 2160 kcal and 135 g pro/day.)  Nutritional Goals: meet % of estimated nutrient needs     Outcome: Ongoing     Problem: Non-Violent Restraints  Goal: Removal from restraints as soon as assessed to be safe  4/7/2021 1236 by Brea Odonnell RN  Outcome: Not Met This Shift  4/7/2021 0652 by Ciro Llanos RN  Outcome: Not Met This Shift  Goal: No harm/injury to patient while restraints in use  4/7/2021 1236 by Angela Maya RN  Outcome: Not Met This Shift  4/7/2021 0302 by Aleyda Francisco RN  Outcome: Met This Shift  Goal: Patient's dignity will be maintained  4/7/2021 1236 by Angela Maya RN  Outcome: Not Met This Shift  4/7/2021 0652 by Aleyda Francisco RN  Outcome: Met This Shift
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
## 2021-04-07 NOTE — CARE COORDINATION
Case Management Initial Discharge Plan  Gabriel Parmar,            called wife spoke to her on phone-: Nery Bustamante  to discuss discharge plans. Information verified: address, contacts, phone number, , insurance yes    Emergency Contact/Next of Kin name & number: Nery Bustamante wife [phone] Letty Person son [phone]    PCP: Zara Martin MD  Date of last visit:     Insurance Provider: [employer] Medicare  And [place] [id number] unions    Discharge Planning    Living Arrangements:    lives with wife  Support Systems:       Home has 2 stories  3 stairs to climb to get into front door, 10stairs to climb to reach second floor  Location of bedroom/bathroom in home  Down bedrooms up sleeps in chair     Patient able to perform ADL's:Independent    Current Services (outpatient & in home) no  DME equipment: no  DME provider: no    Receiving oral anticoagulation therapy? No    If indicated:   Physician managing anticoagulation treatment:   Where does patient obtain lab work for ATC treatment? Potential Assistance Needed:       Patient agreeable to home care: No  [facility] of choice provided:  no    Prior SNF/Rehab Placement and Facility:   Agreeable to SNF/Rehab: No  [facility] of choice provided: no     Evaluation: no    Expected Discharge date:       Patient expects to be discharged to: Follow Up Appointment: Best Day/ Time:      Transportation provider: no  Transportation arrangements needed for discharge: No    Readmission Risk              Risk of Unplanned Readmission:        14             Does patient have a readmission risk score greater than 14?: No  If yes, follow-up appointment must be made within 7 days of discharge.      Goals of Care:       Discharge Plan: home independently with wife follow for needs or placement           Electronically signed by Noe Baeza RN on 21 at 5:24 PM EDT
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
## 2021-04-07 NOTE — PROCEDURES
Arterial Line Placement Procedure Note    DATE: 4/6/2021  RE: [place] [place]   1[birth year]1/1880    PREOPERATIVE DIAGNOSIS:  Hypotension    POSTOPERATIVE DIAGNOSIS:  Hypotension    INDICATION:  Need or invasive blood pressure monitoring. OPERATION PERFORMED:  Placement of a 18 Gauge arterial line in left fem    Performed by:Jameson Flores DO PGY2    ASSISTANT(S):      ANESTHESIA:  None    Procedure: Sterile technique was initiated (hand washing, gown, cap, mask, gloves, draping) before the skin over the left femoral artery was prepped with chlorhexidine and draped in a sterile fashion. After skin infiltration with 1% plain lidocaine, the vessel was identified with a 20 Ga. introducer needle and a guide wire was placed without difficulty. Then, a 18 Ga. catheter was easily threaded. Good waveform obtained on monitor and line withdrew and flushed well. A-line was secured with skin sutures, and dressed.     Complications: None    Carolin Organ  9:02 PM

## 2021-04-07 NOTE — PLAN OF CARE
Problem: OXYGENATION/RESPIRATORY FUNCTION  Goal: Patient will maintain patent airway  4/7/2021 0756 by Baldomero Huston RCP  Outcome: Ongoing  4/7/2021 0652 by Keshia Saini RN  Outcome: Ongoing  4/6/2021 2105 by Alec Smart RCP  Outcome: Ongoing  Goal: Patient will achieve/maintain normal respiratory rate/effort  Description: Respiratory rate and effort will be within normal limits for the patient  4/7/2021 0756 by Baldomero Huston RCP  Outcome: Ongoing  4/7/2021 0652 by Keshia Saini RN  Outcome: Ongoing  4/6/2021 2105 by Alec Smart RCP  Outcome: Ongoing     Problem: MECHANICAL VENTILATION  Goal: Patient will maintain patent airway  4/7/2021 0756 by Baldomero Huston RCP  Outcome: Ongoing  4/7/2021 0652 by Keshia Saini RN  Outcome: Ongoing  4/6/2021 2105 by Alec Smart RCP  Outcome: Ongoing  Goal: Oral health is maintained or improved  4/7/2021 0756 by Baldomero Huston RCP  Outcome: Ongoing  4/7/2021 0652 by Keshia Saini RN  Outcome: Ongoing  4/6/2021 2105 by Alec Smart RCP  Outcome: Ongoing  Goal: ET tube will be managed safely  4/7/2021 0756 by Baldomero Huston RCP  Outcome: Ongoing  4/7/2021 0652 by Keshia Saini RN  Outcome: Ongoing  4/6/2021 2105 by Alec Smart RCP  Outcome: Ongoing  Goal: Ability to express needs and understand communication  4/7/2021 0756 by Baldomero Huston RCP  Outcome: Ongoing  4/7/2021 0652 by Keshia Saini RN  Outcome: Ongoing  4/6/2021 2105 by Alec Smart RCP  Outcome: Ongoing  Goal: Mobility/activity is maintained at optimum level for patient  4/7/2021 0756 by Baldomero Huston RCP  Outcome: Ongoing  4/7/2021 0652 by Keshia Saini RN  Outcome: Ongoing  4/6/2021 2105 by Alec Smart RCP  Outcome: Ongoing     Problem: SKIN INTEGRITY  Goal: Skin integrity is maintained or improved  4/7/2021 0756 by Baldomero Huston RCP  Outcome: Ongoing  4/7/2021 0652 by Keshia Saini RN  Outcome: Ongoing  4/6/2021 2105 by Alec Smart RCP  Outcome: Ongoing

## 2021-04-07 NOTE — PLAN OF CARE
Problem: OXYGENATION/RESPIRATORY FUNCTION  Goal: Patient will maintain patent airway  Outcome: Ongoing  Goal: Patient will achieve/maintain normal respiratory rate/effort  Description: Respiratory rate and effort will be within normal limits for the patient  Outcome: Ongoing       Problem: MECHANICAL VENTILATION  Goal: Patient will maintain patent airway  Outcome: Ongoing  Goal: Oral health is maintained or improved  Outcome: Ongoing  Goal: ET tube will be managed safely  Outcome: Ongoing  Goal: Ability to express needs and understand communication  Outcome: Ongoing  Goal: Mobility/activity is maintained at optimum level for patient  Outcome: Ongoing     Problem: SKIN INTEGRITY  Goal: Skin integrity is maintained or improved  Outcome: Ongoing

## 2021-04-07 NOTE — PLAN OF CARE
Problem: OXYGENATION/RESPIRATORY FUNCTION  Goal: Patient will maintain patent airway  4/7/2021 0652 by Cade Benoit RN  Outcome: Ongoing  4/6/2021 2105 by Lauren Gonzalez RCP  Outcome: Ongoing  Goal: Patient will achieve/maintain normal respiratory rate/effort  Description: Respiratory rate and effort will be within normal limits for the patient  4/7/2021 7531 by Cade Benoit RN  Outcome: Ongoing  4/6/2021 2105 by Lauren Gonzalez RCP  Outcome: Ongoing     Problem: MECHANICAL VENTILATION  Goal: Patient will maintain patent airway  4/7/2021 0652 by Cade Benoit RN  Outcome: Ongoing  4/6/2021 2105 by Lauren Gonzalez RCP  Outcome: Ongoing  Goal: Oral health is maintained or improved  4/7/2021 0652 by Cade Benoit RN  Outcome: Ongoing  4/6/2021 2105 by Lauren Gonzalez RCP  Outcome: Ongoing  Goal: ET tube will be managed safely  4/7/2021 0652 by Cade Benoit RN  Outcome: Ongoing  4/6/2021 2105 by Lauren Gonzalez RCP  Outcome: Ongoing  Goal: Ability to express needs and understand communication  4/7/2021 0652 by Cade Benoit RN  Outcome: Ongoing  4/6/2021 2105 by Lauren Gonzalez RCP  Outcome: Ongoing  Goal: Mobility/activity is maintained at optimum level for patient  4/7/2021 3051 by Cade Benoit RN  Outcome: Ongoing  4/6/2021 2105 by Lauren Gonzalez RCP  Outcome: Ongoing     Problem: SKIN INTEGRITY  Goal: Skin integrity is maintained or improved  4/7/2021 0652 by Cade Benoit RN  Outcome: Ongoing  4/6/2021 2105 by Lauren Gonzalez RCP  Outcome: Ongoing     Problem: Falls - Risk of:  Goal: Will remain free from falls  Description: Will remain free from falls  Outcome: Ongoing  Goal: Absence of physical injury  Description: Absence of physical injury  Outcome: Ongoing     Problem: Confusion - Acute:  Goal: Absence of continued neurological deterioration signs and symptoms  Description: Absence of continued neurological deterioration signs and symptoms  Outcome: Ongoing  Goal: Mental status will be restored to baseline  Description: Mental status will be restored to baseline  Outcome: Ongoing     Problem: Discharge Planning:  Goal: Ability to perform activities of daily living will improve  Description: Ability to perform activities of daily living will improve  Outcome: Ongoing  Goal: Participates in care planning  Description: Participates in care planning  Outcome: Ongoing  Goal: Discharged to appropriate level of care  Description: Discharged to appropriate level of care  Outcome: Ongoing     Problem: Injury - Risk of, Physical Injury:  Goal: Will remain free from falls  Description: Will remain free from falls  Outcome: Ongoing  Goal: Absence of physical injury  Description: Absence of physical injury  Outcome: Ongoing     Problem: Mood - Altered:  Goal: Mood stable  Description: Mood stable  Outcome: Ongoing  Goal: Absence of abusive behavior  Description: Absence of abusive behavior  Outcome: Ongoing  Goal: Verbalizations of feeling emotionally comfortable while being cared for will increase  Description: Verbalizations of feeling emotionally comfortable while being cared for will increase  Outcome: Ongoing     Problem: Sensory Perception - Impaired:  Goal: Demonstrations of improved sensory functioning will increase  Description: Demonstrations of improved sensory functioning will increase  Outcome: Ongoing  Goal: Decrease in sensory misperception frequency  Description: Decrease in sensory misperception frequency  Outcome: Ongoing  Goal: Able to refrain from responding to false sensory perceptions  Description: Able to refrain from responding to false sensory perceptions  Outcome: Ongoing  Goal: Demonstrates accurate environmental perceptions  Description: Demonstrates accurate environmental perceptions  Outcome: Ongoing  Goal: Able to distinguish between reality-based and nonreality-based thinking  Description: Able to distinguish between reality-based and nonreality-based thinking  Outcome: Ongoing  Goal: Able to interrupt nonreality-based thinking  Description: Able to interrupt nonreality-based thinking  Outcome: Ongoing     Problem: Sleep Pattern Disturbance:  Goal: Appears well-rested  Description: Appears well-rested  Outcome: Ongoing     Problem: Skin Integrity:  Goal: Will show no infection signs and symptoms  Description: Will show no infection signs and symptoms  Outcome: Ongoing  Goal: Absence of new skin breakdown  Description: Absence of new skin breakdown  Outcome: Ongoing     Problem: Infection - Surgical Site:  Goal: Will show no infection signs and symptoms  Description: Will show no infection signs and symptoms  Outcome: Ongoing     Problem: Injury - Risk of, Postfracture Complications:  Goal: Absence of fat embolism  Description: Absence of fat embolism  Outcome: Ongoing  Goal: Absence of compartment syndrome signs and symptoms  Description: Absence of compartment syndrome signs and symptoms  Outcome: Ongoing     Problem: Mobility - Impaired:  Goal: Mobility will improve to maximum level  Description: Mobility will improve to maximum level  Outcome: Ongoing     Problem: Pain - Acute:  Goal: Pain level will decrease  Description: Pain level will decrease  Outcome: Ongoing     Problem: Venous Thromboembolism:  Goal: Absence of deep vein thrombosis  Description: Absence of deep vein thrombosis  Outcome: Ongoing  Goal: Absence of signs or symptoms of impaired coagulation  Description: Absence of signs or symptoms of impaired coagulation  Outcome: Ongoing  Goal: Will show no signs or symptoms of venous thromboembolism  Description: Will show no signs or symptoms of venous thromboembolism  Outcome: Ongoing     Problem: Non-Violent Restraints  Goal: Removal from restraints as soon as assessed to be safe  Outcome: Not Met This Shift  Goal: No harm/injury to patient while restraints in use  Outcome: Met This Shift  Goal: Patient's dignity will be maintained  Outcome: Met This Shift
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
## 2021-04-07 NOTE — PROGRESS NOTES
04/06/21 2137   ETT (adult)   Placement Date/Time: 04/06/21 1929   Timeout: Patient;Procedure;Site/Side;Appropriate Equipment;Correct Position  Preoxygenation: Yes  Mask Ventilation: Ventilated by mask (1)  Technique: Video laryngoscopy  Type: Cuffed  Tube Size: 8 mm  Laryngoscope. ..    Secured at 26 cm  (post x-ray)   Measured From Lips   ET Placement Right   Secured By Twill tape

## 2021-04-07 NOTE — PROGRESS NOTES
The transport originated from TICU. Pt. was transported to CT Scan. Assisting with the transport was Kenyon Garcia RN. Appropriate devices were applied to monitor the patient's condition during transport. Patient transported  via 50% O2 via ventilator. Patient tolerated well.         Ankit Lopez  2:14 AM

## 2021-04-07 NOTE — PROCEDURES
Central Line Procedure Note      Patient Name: Clarisa Pereyra   : 1881  DATE: 2021    Procedure Name: CVC placement    Performed by: Te Shepherd DO PGY2    Assistant:  None    Pre Op Diagnosis: Poor venous access    Post Op Diagnosis: Same as Pre-Op    Anesthesia:  none    EBL: <55 ml    Complications: None    DISCUSSION:  Erik Saab is a 14 y.o.-year-old male who requires additional IV access and central pressure monitoring. Was placed emergently. The patient was then prepared for the procedure. PROCEDURE:  A timeout was initiated by the bedside nurse and was confirmed by those present. The patient was placed in a supine position. The skin overlying the Left Femoral Vein was prepped with chlorhexadine and draped in sterile fashion. The skin was infiltrated with local anesthetic. Through the anesthetized region, the introducer needle was inserted into the femoral vein returning dark red non pulsatile blood. A guidewire was placed through the center of the needle with no resistance. A small incision made in the skin with a #11 scalpel blade. The dilator was inserted into the skin and vein over guidewire using Seldinger technique. The dilator was then removed and the catheter was placed in the vein over the guidewire using Seldinger technique. The guidewire was then removed and all ports aspirated and flushed appropriately. The catheter then secured using silk suture and a temporary sterile dressing was applied. No immediate complication was evident. All sponge, instrument and needle counts were correct at the completion of the procedure. The patient tolerated the procedure well with no immediate complication evident. Two prior attempts at CVC placement were unsuccessful.       Te Shepherd DO  21, 9:04 PM
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
## 2021-04-07 NOTE — PROGRESS NOTES
Speech Language Pathology  [address]  Speech Language Pathology      Date: 4/7/2021  Patient Name: Michael Avila  YOB: 1950   AGE: 66 y.o. MRN: [id number]        Patient Not Available for Speech Therapy     Due to:  [] Testing  [] Hemodialysis  [] Cancelled by RN  [] Surgery   [x] Intubation/Sedation/Pain Medication  [] Medical instability  [] Other:    Next scheduled treatment: as medically appropriate  Completed by:  LINDSEY Hamilton
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
## 2021-04-08 ENCOUNTER — APPOINTMENT (OUTPATIENT)
Dept: GENERAL RADIOLOGY | Age: 71
DRG: 003 | End: 2021-04-08
Payer: MEDICARE

## 2021-04-08 ENCOUNTER — ANESTHESIA (OUTPATIENT)
Dept: OPERATING ROOM | Age: 71
DRG: 003 | End: 2021-04-08
Payer: MEDICARE

## 2021-04-08 ENCOUNTER — APPOINTMENT (OUTPATIENT)
Dept: CT IMAGING | Age: 71
DRG: 003 | End: 2021-04-08
Payer: MEDICARE

## 2021-04-08 PROBLEM — I63.9 ACUTE ISCHEMIC STROKE (HCC): Status: ACTIVE | Noted: 2021-04-08

## 2021-04-08 LAB
ABSOLUTE EOS #: 0 K/UL (ref 0–0.4)
ABSOLUTE EOS #: 0.03 K/UL (ref 0–0.44)
ABSOLUTE IMMATURE GRANULOCYTE: 0.06 K/UL (ref 0–0.3)
ABSOLUTE IMMATURE GRANULOCYTE: 0.11 K/UL (ref 0–0.3)
ABSOLUTE LYMPH #: 0.42 K/UL (ref 1–4.8)
ABSOLUTE LYMPH #: 0.91 K/UL (ref 1.1–3.7)
ABSOLUTE MONO #: 0.42 K/UL (ref 0.1–0.8)
ABSOLUTE MONO #: 0.88 K/UL (ref 0.1–1.2)
ALLEN TEST: ABNORMAL
ANION GAP SERPL CALCULATED.3IONS-SCNC: 7 MMOL/L (ref 9–17)
BASOPHILS # BLD: 0 % (ref 0–2)
BASOPHILS # BLD: 1 % (ref 0–2)
BASOPHILS ABSOLUTE: 0.05 K/UL (ref 0–0.2)
BASOPHILS ABSOLUTE: <0.03 K/UL (ref 0–0.2)
BUN BLDV-MCNC: 30 MG/DL (ref 8–23)
BUN/CREAT BLD: ABNORMAL (ref 9–20)
CALCIUM IONIZED: 1.12 MMOL/L (ref 1.13–1.33)
CALCIUM SERPL-MCNC: 7.9 MG/DL (ref 8.6–10.4)
CHLORIDE BLD-SCNC: 113 MMOL/L (ref 98–107)
CO2: 21 MMOL/L (ref 20–31)
CREAT SERPL-MCNC: 0.83 MG/DL (ref 0.7–1.2)
DIFFERENTIAL TYPE: ABNORMAL
DIFFERENTIAL TYPE: ABNORMAL
EOSINOPHILS RELATIVE PERCENT: 0 % (ref 1–4)
EOSINOPHILS RELATIVE PERCENT: 1 % (ref 1–4)
FIO2: 40
GFR AFRICAN AMERICAN: >60 ML/MIN
GFR NON-AFRICAN AMERICAN: >60 ML/MIN
GFR SERPL CREATININE-BSD FRML MDRD: ABNORMAL ML/MIN/{1.73_M2}
GFR SERPL CREATININE-BSD FRML MDRD: ABNORMAL ML/MIN/{1.73_M2}
GLUCOSE BLD-MCNC: 162 MG/DL (ref 75–110)
GLUCOSE BLD-MCNC: 164 MG/DL (ref 75–110)
GLUCOSE BLD-MCNC: 164 MG/DL (ref 75–110)
GLUCOSE BLD-MCNC: 176 MG/DL (ref 70–99)
GLUCOSE BLD-MCNC: 186 MG/DL (ref 75–110)
GLUCOSE BLD-MCNC: 190 MG/DL (ref 75–110)
GLUCOSE BLD-MCNC: 207 MG/DL (ref 74–100)
HCT VFR BLD CALC: 22.6 % (ref 40.7–50.3)
HCT VFR BLD CALC: 26.1 % (ref 40.7–50.3)
HEMOGLOBIN: 7.3 G/DL (ref 13–17)
HEMOGLOBIN: 8.5 G/DL (ref 13–17)
IMMATURE GRANULOCYTES: 1 %
IMMATURE GRANULOCYTES: 2 %
LYMPHOCYTES # BLD: 15 % (ref 24–43)
LYMPHOCYTES # BLD: 8 % (ref 24–44)
MAGNESIUM: 2 MG/DL (ref 1.6–2.6)
MCH RBC QN AUTO: 31.8 PG (ref 25.2–33.5)
MCH RBC QN AUTO: 32.2 PG (ref 25.2–33.5)
MCHC RBC AUTO-ENTMCNC: 32.3 G/DL (ref 28.4–34.8)
MCHC RBC AUTO-ENTMCNC: 32.6 G/DL (ref 28.4–34.8)
MCV RBC AUTO: 97.8 FL (ref 82.6–102.9)
MCV RBC AUTO: 99.6 FL (ref 82.6–102.9)
MODE: ABNORMAL
MONOCYTES # BLD: 14 % (ref 3–12)
MONOCYTES # BLD: 8 % (ref 1–7)
MORPHOLOGY: ABNORMAL
MORPHOLOGY: ABNORMAL
NEGATIVE BASE EXCESS, ART: 1 (ref 0–2)
NRBC AUTOMATED: 0 PER 100 WBC
NRBC AUTOMATED: 0.4 PER 100 WBC
O2 DEVICE/FLOW/%: ABNORMAL
PATIENT TEMP: ABNORMAL
PDW BLD-RTO: 14.3 % (ref 11.8–14.4)
PDW BLD-RTO: 14.6 % (ref 11.8–14.4)
PHOSPHORUS: 2.4 MG/DL (ref 2.5–4.5)
PLATELET # BLD: 50 K/UL (ref 138–453)
PLATELET # BLD: 62 K/UL (ref 138–453)
PLATELET ESTIMATE: ABNORMAL
PLATELET ESTIMATE: ABNORMAL
PMV BLD AUTO: 10.4 FL (ref 8.1–13.5)
PMV BLD AUTO: 10.4 FL (ref 8.1–13.5)
POC HCO3: 22.7 MMOL/L (ref 21–28)
POC LACTIC ACID: 1.28 MMOL/L (ref 0.56–1.39)
POC O2 SATURATION: 98 % (ref 94–98)
POC PCO2 TEMP: ABNORMAL MM HG
POC PCO2: 32.1 MM HG (ref 35–48)
POC PH TEMP: ABNORMAL
POC PH: 7.46 (ref 7.35–7.45)
POC PO2 TEMP: ABNORMAL MM HG
POC PO2: 95.4 MM HG (ref 83–108)
POSITIVE BASE EXCESS, ART: ABNORMAL (ref 0–3)
POTASSIUM SERPL-SCNC: 4.4 MMOL/L (ref 3.7–5.3)
RBC # BLD: 2.27 M/UL (ref 4.21–5.77)
RBC # BLD: 2.67 M/UL (ref 4.21–5.77)
RBC # BLD: ABNORMAL 10*6/UL
RBC # BLD: ABNORMAL 10*6/UL
SAMPLE SITE: ABNORMAL
SEG NEUTROPHILS: 69 % (ref 36–65)
SEG NEUTROPHILS: 81 % (ref 36–66)
SEGMENTED NEUTROPHILS ABSOLUTE COUNT: 4.25 K/UL (ref 1.5–8.1)
SEGMENTED NEUTROPHILS ABSOLUTE COUNT: 4.3 K/UL (ref 1.8–7.7)
SODIUM BLD-SCNC: 141 MMOL/L (ref 135–144)
TCO2 (CALC), ART: 24 MMOL/L (ref 22–29)
WBC # BLD: 5.3 K/UL (ref 3.5–11.3)
WBC # BLD: 6.2 K/UL (ref 3.5–11.3)
WBC # BLD: ABNORMAL 10*3/UL
WBC # BLD: ABNORMAL 10*3/UL

## 2021-04-08 PROCEDURE — 99223 1ST HOSP IP/OBS HIGH 75: CPT | Performed by: INTERNAL MEDICINE

## 2021-04-08 PROCEDURE — 71045 X-RAY EXAM CHEST 1 VIEW: CPT

## 2021-04-08 PROCEDURE — 85025 COMPLETE CBC W/AUTO DIFF WBC: CPT

## 2021-04-08 PROCEDURE — 99221 1ST HOSP IP/OBS SF/LOW 40: CPT | Performed by: NEUROLOGICAL SURGERY

## 2021-04-08 PROCEDURE — 94640 AIRWAY INHALATION TREATMENT: CPT

## 2021-04-08 PROCEDURE — 84100 ASSAY OF PHOSPHORUS: CPT

## 2021-04-08 PROCEDURE — 6360000002 HC RX W HCPCS: Performed by: STUDENT IN AN ORGANIZED HEALTH CARE EDUCATION/TRAINING PROGRAM

## 2021-04-08 PROCEDURE — 6370000000 HC RX 637 (ALT 250 FOR IP): Performed by: NURSE PRACTITIONER

## 2021-04-08 PROCEDURE — 94003 VENT MGMT INPAT SUBQ DAY: CPT

## 2021-04-08 PROCEDURE — 6370000000 HC RX 637 (ALT 250 FOR IP): Performed by: STUDENT IN AN ORGANIZED HEALTH CARE EDUCATION/TRAINING PROGRAM

## 2021-04-08 PROCEDURE — 80048 BASIC METABOLIC PNL TOTAL CA: CPT

## 2021-04-08 PROCEDURE — 2580000003 HC RX 258: Performed by: STUDENT IN AN ORGANIZED HEALTH CARE EDUCATION/TRAINING PROGRAM

## 2021-04-08 PROCEDURE — 2500000003 HC RX 250 WO HCPCS: Performed by: STUDENT IN AN ORGANIZED HEALTH CARE EDUCATION/TRAINING PROGRAM

## 2021-04-08 PROCEDURE — 70450 CT HEAD/BRAIN W/O DYE: CPT

## 2021-04-08 PROCEDURE — 6360000002 HC RX W HCPCS: Performed by: NURSE PRACTITIONER

## 2021-04-08 PROCEDURE — 2700000000 HC OXYGEN THERAPY PER DAY

## 2021-04-08 PROCEDURE — 82330 ASSAY OF CALCIUM: CPT

## 2021-04-08 PROCEDURE — 83605 ASSAY OF LACTIC ACID: CPT

## 2021-04-08 PROCEDURE — 2000000000 HC ICU R&B

## 2021-04-08 PROCEDURE — 94002 VENT MGMT INPAT INIT DAY: CPT

## 2021-04-08 PROCEDURE — 82803 BLOOD GASES ANY COMBINATION: CPT

## 2021-04-08 PROCEDURE — P9037 PLATE PHERES LEUKOREDU IRRAD: HCPCS

## 2021-04-08 PROCEDURE — 82947 ASSAY GLUCOSE BLOOD QUANT: CPT

## 2021-04-08 PROCEDURE — P9073 PLATELETS PHERESIS PATH REDU: HCPCS

## 2021-04-08 PROCEDURE — 83735 ASSAY OF MAGNESIUM: CPT

## 2021-04-08 PROCEDURE — 36430 TRANSFUSION BLD/BLD COMPNT: CPT

## 2021-04-08 PROCEDURE — 94761 N-INVAS EAR/PLS OXIMETRY MLT: CPT

## 2021-04-08 PROCEDURE — APPSS45 APP SPLIT SHARED TIME 31-45 MINUTES: Performed by: REGISTERED NURSE

## 2021-04-08 PROCEDURE — 37799 UNLISTED PX VASCULAR SURGERY: CPT

## 2021-04-08 RX ORDER — SODIUM CHLORIDE 9 MG/ML
INJECTION, SOLUTION INTRAVENOUS PRN
Status: DISCONTINUED | OUTPATIENT
Start: 2021-04-08 | End: 2021-04-09

## 2021-04-08 RX ORDER — FENTANYL CITRATE 50 UG/ML
25 INJECTION, SOLUTION INTRAMUSCULAR; INTRAVENOUS
Status: DISCONTINUED | OUTPATIENT
Start: 2021-04-08 | End: 2021-04-08

## 2021-04-08 RX ORDER — MIDAZOLAM HYDROCHLORIDE 2 MG/2ML
1 INJECTION, SOLUTION INTRAMUSCULAR; INTRAVENOUS ONCE
Status: DISCONTINUED | OUTPATIENT
Start: 2021-04-08 | End: 2021-04-09

## 2021-04-08 RX ORDER — FENTANYL CITRATE 50 UG/ML
50 INJECTION, SOLUTION INTRAMUSCULAR; INTRAVENOUS ONCE
Status: DISCONTINUED | OUTPATIENT
Start: 2021-04-08 | End: 2021-04-09

## 2021-04-08 RX ORDER — LACTULOSE 10 G/15ML
20 SOLUTION ORAL 3 TIMES DAILY
Status: DISCONTINUED | OUTPATIENT
Start: 2021-04-08 | End: 2021-04-17 | Stop reason: HOSPADM

## 2021-04-08 RX ORDER — ACETAMINOPHEN 650 MG
TABLET, EXTENDED RELEASE ORAL PRN
Status: DISCONTINUED | OUTPATIENT
Start: 2021-04-08 | End: 2021-04-17 | Stop reason: HOSPADM

## 2021-04-08 RX ORDER — FENTANYL CITRATE 50 UG/ML
50 INJECTION, SOLUTION INTRAMUSCULAR; INTRAVENOUS
Status: DISCONTINUED | OUTPATIENT
Start: 2021-04-08 | End: 2021-04-12

## 2021-04-08 RX ADMIN — LEVETIRACETAM 500 MG: 100 SOLUTION ORAL at 20:23

## 2021-04-08 RX ADMIN — INSULIN LISPRO 6 UNITS: 100 INJECTION, SOLUTION INTRAVENOUS; SUBCUTANEOUS at 08:29

## 2021-04-08 RX ADMIN — IPRATROPIUM BROMIDE AND ALBUTEROL SULFATE 1 AMPULE: .5; 3 SOLUTION RESPIRATORY (INHALATION) at 11:16

## 2021-04-08 RX ADMIN — INSULIN LISPRO 3 UNITS: 100 INJECTION, SOLUTION INTRAVENOUS; SUBCUTANEOUS at 16:24

## 2021-04-08 RX ADMIN — SODIUM CHLORIDE, PRESERVATIVE FREE 10 ML: 5 INJECTION INTRAVENOUS at 08:39

## 2021-04-08 RX ADMIN — FUROSEMIDE 20 MG: 20 TABLET ORAL at 20:23

## 2021-04-08 RX ADMIN — PROPOFOL 15 MCG/KG/MIN: 10 INJECTION, EMULSION INTRAVENOUS at 04:11

## 2021-04-08 RX ADMIN — SODIUM CHLORIDE: 9 INJECTION, SOLUTION INTRAVENOUS at 00:38

## 2021-04-08 RX ADMIN — CHLORHEXIDINE GLUCONATE 0.12% ORAL RINSE 15 ML: 1.2 LIQUID ORAL at 20:23

## 2021-04-08 RX ADMIN — ACETAMINOPHEN 1000 MG: 650 SOLUTION ORAL at 16:26

## 2021-04-08 RX ADMIN — BACITRACIN: 500 OINTMENT TOPICAL at 08:38

## 2021-04-08 RX ADMIN — POLYETHYLENE GLYCOL 3350 17 G: 17 POWDER, FOR SOLUTION ORAL at 08:28

## 2021-04-08 RX ADMIN — IPRATROPIUM BROMIDE AND ALBUTEROL SULFATE 1 AMPULE: .5; 3 SOLUTION RESPIRATORY (INHALATION) at 23:21

## 2021-04-08 RX ADMIN — ACETAMINOPHEN 1000 MG: 650 SOLUTION ORAL at 08:29

## 2021-04-08 RX ADMIN — LACTULOSE 20 G: 10 SOLUTION ORAL at 20:23

## 2021-04-08 RX ADMIN — FAMOTIDINE 20 MG: 10 INJECTION INTRAVENOUS at 08:28

## 2021-04-08 RX ADMIN — SPIRONOLACTONE 50 MG: 25 TABLET ORAL at 08:28

## 2021-04-08 RX ADMIN — ACETAMINOPHEN 1000 MG: 650 SOLUTION ORAL at 00:28

## 2021-04-08 RX ADMIN — FENTANYL CITRATE 50 MCG: 50 INJECTION, SOLUTION INTRAMUSCULAR; INTRAVENOUS at 22:45

## 2021-04-08 RX ADMIN — IPRATROPIUM BROMIDE AND ALBUTEROL SULFATE 1 AMPULE: .5; 3 SOLUTION RESPIRATORY (INHALATION) at 19:54

## 2021-04-08 RX ADMIN — INSULIN LISPRO 3 UNITS: 100 INJECTION, SOLUTION INTRAVENOUS; SUBCUTANEOUS at 11:46

## 2021-04-08 RX ADMIN — FUROSEMIDE 20 MG: 20 TABLET ORAL at 08:28

## 2021-04-08 RX ADMIN — SODIUM CHLORIDE, PRESERVATIVE FREE 10 ML: 5 INJECTION INTRAVENOUS at 20:24

## 2021-04-08 RX ADMIN — PROPOFOL 15 MCG/KG/MIN: 10 INJECTION, EMULSION INTRAVENOUS at 13:46

## 2021-04-08 RX ADMIN — IPRATROPIUM BROMIDE AND ALBUTEROL SULFATE 1 AMPULE: .5; 3 SOLUTION RESPIRATORY (INHALATION) at 15:57

## 2021-04-08 RX ADMIN — BACITRACIN: 500 OINTMENT TOPICAL at 20:24

## 2021-04-08 RX ADMIN — FAMOTIDINE 20 MG: 10 INJECTION INTRAVENOUS at 20:23

## 2021-04-08 RX ADMIN — INSULIN LISPRO 3 UNITS: 100 INJECTION, SOLUTION INTRAVENOUS; SUBCUTANEOUS at 00:28

## 2021-04-08 RX ADMIN — INSULIN LISPRO 3 UNITS: 100 INJECTION, SOLUTION INTRAVENOUS; SUBCUTANEOUS at 04:53

## 2021-04-08 RX ADMIN — IPRATROPIUM BROMIDE AND ALBUTEROL SULFATE 1 AMPULE: .5; 3 SOLUTION RESPIRATORY (INHALATION) at 07:49

## 2021-04-08 RX ADMIN — LEVETIRACETAM 500 MG: 100 SOLUTION ORAL at 08:28

## 2021-04-08 RX ADMIN — INSULIN LISPRO 3 UNITS: 100 INJECTION, SOLUTION INTRAVENOUS; SUBCUTANEOUS at 20:33

## 2021-04-08 RX ADMIN — PROPOFOL 10 MCG/KG/MIN: 10 INJECTION, EMULSION INTRAVENOUS at 20:22

## 2021-04-08 RX ADMIN — LACTULOSE 10 G: 20 SOLUTION ORAL at 08:28

## 2021-04-08 RX ADMIN — IPRATROPIUM BROMIDE AND ALBUTEROL SULFATE 1 AMPULE: .5; 3 SOLUTION RESPIRATORY (INHALATION) at 03:27

## 2021-04-08 ASSESSMENT — PULMONARY FUNCTION TESTS
PIF_VALUE: 26
PIF_VALUE: 21
PIF_VALUE: 31
PIF_VALUE: 17
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
## 2021-04-08 NOTE — PLAN OF CARE
Problem: OXYGENATION/RESPIRATORY FUNCTION  Goal: Patient will maintain patent airway  4/7/2021 2139 by Pura Palumbo RN  Outcome: Ongoing  4/7/2021 1236 by Lyndsay Gruber RN  Outcome: Ongoing  4/7/2021 0756 by Hans Cardozo RCP  Outcome: Ongoing  Goal: Patient will achieve/maintain normal respiratory rate/effort  Description: Respiratory rate and effort will be within normal limits for the patient  4/7/2021 2139 by Pura Palumbo RN  Outcome: Ongoing  4/7/2021 1236 by Lyndsay Gruber RN  Outcome: Ongoing  4/7/2021 0756 by Hans Cardozo RCP  Outcome: Ongoing     Problem: MECHANICAL VENTILATION  Goal: Patient will maintain patent airway  4/7/2021 2139 by Pura Palumbo RN  Outcome: Ongoing  4/7/2021 1236 by Lyndsay Gruber RN  Outcome: Ongoing  4/7/2021 0756 by Hans Cardozo RCP  Outcome: Ongoing  Goal: Oral health is maintained or improved  4/7/2021 2139 by Pura Palumbo RN  Outcome: Ongoing  4/7/2021 1236 by Lyndsay Gruber RN  Outcome: Ongoing  4/7/2021 0756 by Hans Cardozo RCP  Outcome: Ongoing  Goal: ET tube will be managed safely  4/7/2021 2139 by Pura Palumbo RN  Outcome: Ongoing  4/7/2021 1236 by Lyndsay Gruber RN  Outcome: Ongoing  4/7/2021 0756 by Hans Cardozo RCP  Outcome: Ongoing  Goal: Ability to express needs and understand communication  4/7/2021 2139 by Pura Palumbo RN  Outcome: Ongoing  4/7/2021 1236 by Lyndsay Gruber RN  Outcome: Ongoing  4/7/2021 0756 by Hans Cardozo RCP  Outcome: Ongoing  Goal: Mobility/activity is maintained at optimum level for patient  4/7/2021 2139 by Pura Palumbo RN  Outcome: Ongoing  4/7/2021 1236 by Lyndsay Gruber RN  Outcome: Ongoing  4/7/2021 0756 by Hans Cardozo RCP  Outcome: Ongoing     Problem: SKIN INTEGRITY  Goal: Skin integrity is maintained or improved  4/7/2021 1236 by Lyndsay Gruber RN  Outcome: Ongoing  4/7/2021 0756 by Hans Fam, RCP  Outcome: Ongoing     Problem: Falls - Risk of:  Goal: Will remain free from falls  Description: Will remain free from falls  4/7/2021 1236 by Esha Arroyo RN  Outcome: Ongoing  Goal: Absence of physical injury  Description: Absence of physical injury  4/7/2021 1236 by Esha Arroyo RN  Outcome: Ongoing     Problem: Confusion - Acute:  Goal: Absence of continued neurological deterioration signs and symptoms  Description: Absence of continued neurological deterioration signs and symptoms  4/7/2021 1236 by Esha Arroyo RN  Outcome: Ongoing  Goal: Mental status will be restored to baseline  Description: Mental status will be restored to baseline  4/7/2021 1236 by Esha Arroyo RN  Outcome: Ongoing     Problem: Discharge Planning:  Goal: Ability to perform activities of daily living will improve  Description: Ability to perform activities of daily living will improve  4/7/2021 1236 by Esha Arroyo RN  Outcome: Ongoing  Goal: Participates in care planning  Description: Participates in care planning  4/7/2021 1236 by Esha Arroyo RN  Outcome: Ongoing  Goal: Discharged to appropriate level of care  Description: Discharged to appropriate level of care  4/7/2021 1236 by Esha Arroyo RN  Outcome: Ongoing     Problem: Injury - Risk of, Physical Injury:  Goal: Will remain free from falls  Description: Will remain free from falls  4/7/2021 1236 by Esha Arroyo RN  Outcome: Ongoing  Goal: Absence of physical injury  Description: Absence of physical injury  4/7/2021 1236 by Esha Arroyo RN  Outcome: Ongoing     Problem: Mood - Altered:  Goal: Mood stable  Description: Mood stable  4/7/2021 1236 by Esha Arroyo RN  Outcome: Ongoing  Goal: Absence of abusive behavior  Description: Absence of abusive behavior  4/7/2021 1236 by Esha Arroyo RN  Outcome: Ongoing  Goal: Verbalizations of feeling emotionally comfortable while being cared for will increase  Description: Verbalizations of feeling emotionally comfortable while being cared for will increase  4/7/2021 1236 by Ciarra Putnam RN  Outcome: Ongoing     Problem: Psychomotor Activity - Altered:  Goal: Absence of psychomotor disturbance signs and symptoms  Description: Absence of psychomotor disturbance signs and symptoms  4/7/2021 1236 by Ciarra Putnam RN  Outcome: Ongoing     Problem: Sensory Perception - Impaired:  Goal: Demonstrations of improved sensory functioning will increase  Description: Demonstrations of improved sensory functioning will increase  4/7/2021 1236 by Ciarra Putnam RN  Outcome: Ongoing  Goal: Decrease in sensory misperception frequency  Description: Decrease in sensory misperception frequency  4/7/2021 1236 by Ciarra Putnam RN  Outcome: Ongoing  Goal: Able to refrain from responding to false sensory perceptions  Description: Able to refrain from responding to false sensory perceptions  4/7/2021 1236 by Ciarra Putnam RN  Outcome: Ongoing  Goal: Demonstrates accurate environmental perceptions  Description: Demonstrates accurate environmental perceptions  4/7/2021 1236 by Ciarra Putnam RN  Outcome: Ongoing  Goal: Able to distinguish between reality-based and nonreality-based thinking  Description: Able to distinguish between reality-based and nonreality-based thinking  4/7/2021 1236 by Ciarra Putnam RN  Outcome: Ongoing  Goal: Able to interrupt nonreality-based thinking  Description: Able to interrupt nonreality-based thinking  4/7/2021 1236 by Ciarra Putnam RN  Outcome: Ongoing     Problem: Sleep Pattern Disturbance:  Goal: Appears well-rested  Description: Appears well-rested  4/7/2021 1236 by Ciarra Putnam RN  Outcome: Ongoing     Problem: Skin Integrity:  Goal: Will show no infection signs and symptoms  Description: Will show no infection signs and symptoms  4/7/2021 1236 by Ciarra Putnam RN  Outcome: Ongoing  4/7/2021 0756 by Blake Jameson RCP  Outcome: Ongoing  Goal: Absence of new skin breakdown  Description: Absence of new skin breakdown  4/7/2021 1236 by Florida Habermann Kaiser Butler RN  Outcome: Ongoing  4/7/2021 0756 by Carmina Altamirano RCP  Outcome: Ongoing     Problem: Infection - Surgical Site:  Goal: Will show no infection signs and symptoms  Description: Will show no infection signs and symptoms  4/7/2021 1236 by Anthony Dozier RN  Outcome: Ongoing     Problem: Injury - Risk of, Postfracture Complications:  Goal: Absence of fat embolism  Description: Absence of fat embolism  4/7/2021 1236 by Anthony Dozier RN  Outcome: Ongoing  Goal: Absence of compartment syndrome signs and symptoms  Description: Absence of compartment syndrome signs and symptoms  4/7/2021 1236 by Anthony Dozier RN  Outcome: Ongoing     Problem: Mobility - Impaired:  Goal: Mobility will improve to maximum level  Description: Mobility will improve to maximum level  4/7/2021 1236 by Anthony Dozier RN  Outcome: Ongoing     Problem: Pain - Acute:  Goal: Pain level will decrease  Description: Pain level will decrease  4/7/2021 1236 by Anthony Dozier RN  Outcome: Ongoing     Problem: Venous Thromboembolism:  Goal: Absence of deep vein thrombosis  Description: Absence of deep vein thrombosis  4/7/2021 1236 by Anthony Dozier RN  Outcome: Ongoing  Goal: Absence of signs or symptoms of impaired coagulation  Description: Absence of signs or symptoms of impaired coagulation  4/7/2021 1236 by Anthony Dozier RN  Outcome: Ongoing  Goal: Will show no signs or symptoms of venous thromboembolism  Description: Will show no signs or symptoms of venous thromboembolism  4/7/2021 1236 by Anthony Dozier RN  Outcome: Ongoing     Problem: Non-Violent Restraints  Goal: No harm/injury to patient while restraints in use  4/7/2021 2139 by Moni Morris RN  Outcome: Ongoing  4/7/2021 1236 by Anthony Dozier RN  Outcome: Not Met This Shift  Goal: Patient's dignity will be maintained  4/7/2021 2139 by Moni Morris RN  Outcome: Ongoing  4/7/2021 1236 by Anthony Dozier RN  Outcome: Not Met This Shift     Problem: Nutrition  Goal:
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
## 2021-04-08 NOTE — PROGRESS NOTES
Physical Therapy    DATE: 2021    NAME: Jos Choe  MRN: [id number]   : 1950      Patient not seen this date for Physical Therapy due to:    Surgery/Procedure: Femur fracture      Electronically signed by Mitali Cardenas PT on 2021 at 8:40 AM
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
## 2021-04-08 NOTE — PLAN OF CARE
Problem: OXYGENATION/RESPIRATORY FUNCTION  Goal: Patient will maintain patent airway  4/8/2021 1247 by Santi Jo RN  Outcome: Ongoing  4/8/2021 0840 by Ulysses Abraham RCP  Outcome: Ongoing  Goal: Patient will achieve/maintain normal respiratory rate/effort  Description: Respiratory rate and effort will be within normal limits for the patient  4/8/2021 1247 by Santi Jo RN  Outcome: Ongoing  4/8/2021 0840 by Ulysses Abraham RCP  Outcome: Ongoing     Problem: MECHANICAL VENTILATION  Goal: Patient will maintain patent airway  4/8/2021 1247 by Santi Jo RN  Outcome: Ongoing  4/8/2021 0840 by Ulysses Abraham RCP  Outcome: Ongoing  Goal: Oral health is maintained or improved  4/8/2021 1247 by Santi Jo RN  Outcome: Ongoing  4/8/2021 0840 by Ulysses Abraham RCP  Outcome: Ongoing  Goal: ET tube will be managed safely  4/8/2021 1247 by Santi Jo RN  Outcome: Ongoing  4/8/2021 0840 by Ulysses Abraham RCP  Outcome: Ongoing  Goal: Ability to express needs and understand communication  4/8/2021 1247 by Santi Jo RN  Outcome: Ongoing  4/8/2021 0840 by Ulysses Abraham RCP  Outcome: Ongoing  Goal: Mobility/activity is maintained at optimum level for patient  4/8/2021 1247 by Santi Jo RN  Outcome: Ongoing  4/8/2021 0840 by Ulysses Abraham RCP  Outcome: Ongoing     Problem: SKIN INTEGRITY  Goal: Skin integrity is maintained or improved  4/8/2021 1247 by Santi Jo RN  Outcome: Ongoing  4/8/2021 0840 by Ulysses Abraham RCP  Outcome: Ongoing     Problem: Falls - Risk of:  Goal: Will remain free from falls  Description: Will remain free from falls  Outcome: Ongoing  Goal: Absence of physical injury  Description: Absence of physical injury  Outcome: Ongoing     Problem: Confusion - Acute:  Goal: Absence of continued neurological deterioration signs and symptoms  Description: Absence of continued neurological deterioration signs and

## 2021-04-08 NOTE — PLAN OF CARE
Problem: OXYGENATION/RESPIRATORY FUNCTION  Goal: Patient will maintain patent airway  4/8/2021 0840 by Fredrick Martin RCP  Outcome: Ongoing     Problem: OXYGENATION/RESPIRATORY FUNCTION  Goal: Patient will achieve/maintain normal respiratory rate/effort  Description: Respiratory rate and effort will be within normal limits for the patient  4/8/2021 0840 by Fredrick Martin RCP  Outcome: Ongoing     Problem: MECHANICAL VENTILATION  Goal: Patient will maintain patent airway  4/8/2021 0840 by Fredrick Martin RCP  Outcome: Ongoing     Problem: MECHANICAL VENTILATION  Goal: Oral health is maintained or improved  4/8/2021 0840 by Fredrick Martin RCP  Outcome: Ongoing     Problem: MECHANICAL VENTILATION  Goal: ET tube will be managed safely  4/8/2021 0840 by Fredrick Martin RCP  Outcome: Ongoing     Problem: MECHANICAL VENTILATION  Goal: Ability to express needs and understand communication  4/8/2021 0840 by Fredrick Martin RCP  Outcome: Ongoing     Problem: MECHANICAL VENTILATION  Goal: Mobility/activity is maintained at optimum level for patient  4/8/2021 0840 by Fredrick Martin RCP  Outcome: Ongoing     Problem: SKIN INTEGRITY  Goal: Skin integrity is maintained or improved  Outcome: Ongoing

## 2021-04-08 NOTE — PROGRESS NOTES
Occupational Therapy Not Seen Note    DATE: 2021  Name: Shahana Ugalde  : 1950  MRN: [id number]    Patient not available for Occupational Therapy due to:    Sedation: Propofol/INT   OR with ortho    Next Scheduled Treatment: 2021    Electronically signed by Cinthya Mendez on 2021 at 3:14 PM

## 2021-04-09 ENCOUNTER — APPOINTMENT (OUTPATIENT)
Dept: CT IMAGING | Age: 71
DRG: 003 | End: 2021-04-09
Payer: MEDICARE

## 2021-04-09 ENCOUNTER — APPOINTMENT (OUTPATIENT)
Dept: GENERAL RADIOLOGY | Age: 71
DRG: 003 | End: 2021-04-09
Payer: MEDICARE

## 2021-04-09 VITALS — RESPIRATION RATE: 12 BRPM | TEMPERATURE: 97.6 F | OXYGEN SATURATION: 98 %

## 2021-04-09 LAB
ABO/RH: NORMAL
ABSOLUTE EOS #: 0.04 K/UL (ref 0–0.44)
ABSOLUTE EOS #: 0.06 K/UL (ref 0–0.4)
ABSOLUTE IMMATURE GRANULOCYTE: 0.06 K/UL (ref 0–0.3)
ABSOLUTE IMMATURE GRANULOCYTE: 0.11 K/UL (ref 0–0.3)
ABSOLUTE LYMPH #: 0.32 K/UL (ref 1–4.8)
ABSOLUTE LYMPH #: 0.63 K/UL (ref 1.1–3.7)
ABSOLUTE MONO #: 0.26 K/UL (ref 0.1–0.8)
ABSOLUTE MONO #: 0.42 K/UL (ref 0.1–1.2)
ALBUMIN SERPL-MCNC: 2.7 G/DL (ref 3.5–5.2)
ALBUMIN/GLOBULIN RATIO: 1.3 (ref 1–2.5)
ALLEN TEST: ABNORMAL
ALLEN TEST: ABNORMAL
ALLEN TEST: NORMAL
ALP BLD-CCNC: 54 U/L (ref 40–129)
ALT SERPL-CCNC: 37 U/L (ref 5–41)
AMMONIA: 45 UMOL/L (ref 16–60)
ANION GAP SERPL CALCULATED.3IONS-SCNC: 5 MMOL/L (ref 9–17)
ANION GAP SERPL CALCULATED.3IONS-SCNC: 6 MMOL/L (ref 9–17)
ANTIBODY SCREEN: NEGATIVE
ARM BAND NUMBER: NORMAL
AST SERPL-CCNC: 101 U/L
BASOPHILS # BLD: 0 % (ref 0–2)
BASOPHILS # BLD: 1 % (ref 0–2)
BASOPHILS ABSOLUTE: 0 K/UL (ref 0–0.2)
BASOPHILS ABSOLUTE: 0.03 K/UL (ref 0–0.2)
BILIRUB SERPL-MCNC: 1.22 MG/DL (ref 0.3–1.2)
BLD PROD TYP BPU: NORMAL
BUN BLDV-MCNC: 20 MG/DL (ref 8–23)
BUN BLDV-MCNC: 22 MG/DL (ref 8–23)
BUN/CREAT BLD: ABNORMAL (ref 9–20)
BUN/CREAT BLD: ABNORMAL (ref 9–20)
CALCIUM IONIZED: 1.1 MMOL/L (ref 1.13–1.33)
CALCIUM IONIZED: 1.19 MMOL/L (ref 1.13–1.33)
CALCIUM SERPL-MCNC: 7.8 MG/DL (ref 8.6–10.4)
CALCIUM SERPL-MCNC: 7.9 MG/DL (ref 8.6–10.4)
CARBOXYHEMOGLOBIN: 1 % (ref 0–5)
CHLORIDE BLD-SCNC: 114 MMOL/L (ref 98–107)
CHLORIDE BLD-SCNC: 115 MMOL/L (ref 98–107)
CHLORIDE, WHOLE BLOOD: 115 MMOL/L (ref 98–110)
CO2: 23 MMOL/L (ref 20–31)
CO2: 24 MMOL/L (ref 20–31)
CREAT SERPL-MCNC: 0.65 MG/DL (ref 0.7–1.2)
CREAT SERPL-MCNC: 0.66 MG/DL (ref 0.7–1.2)
DIFFERENTIAL TYPE: ABNORMAL
DIFFERENTIAL TYPE: ABNORMAL
DISPENSE STATUS BLOOD BANK: NORMAL
EOSINOPHILS RELATIVE PERCENT: 1 % (ref 1–4)
EOSINOPHILS RELATIVE PERCENT: 2 % (ref 1–4)
EXPIRATION DATE: NORMAL
FIBRINOGEN: 343 MG/DL (ref 140–420)
FIO2: 40
FIO2: 40
FIO2: ABNORMAL
GFR AFRICAN AMERICAN: >60 ML/MIN
GFR AFRICAN AMERICAN: >60 ML/MIN
GFR NON-AFRICAN AMERICAN: >60 ML/MIN
GFR NON-AFRICAN AMERICAN: >60 ML/MIN
GFR SERPL CREATININE-BSD FRML MDRD: ABNORMAL ML/MIN/{1.73_M2}
GLUCOSE BLD-MCNC: 134 MG/DL (ref 75–110)
GLUCOSE BLD-MCNC: 146 MG/DL (ref 70–99)
GLUCOSE BLD-MCNC: 154 MG/DL (ref 74–100)
GLUCOSE BLD-MCNC: 155 MG/DL (ref 75–110)
GLUCOSE BLD-MCNC: 161 MG/DL (ref 70–99)
GLUCOSE BLD-MCNC: 164 MG/DL (ref 75–110)
GLUCOSE BLD-MCNC: 165 MG/DL (ref 75–110)
GLUCOSE BLD-MCNC: 166 MG/DL (ref 75–110)
GLUCOSE BLD-MCNC: 169 MG/DL (ref 74–100)
GLUCOSE BLD-MCNC: 185 MG/DL (ref 75–110)
HCO3 ARTERIAL: 24.6 MMOL/L (ref 22–27)
HCT VFR BLD CALC: 21.7 % (ref 40.7–50.3)
HCT VFR BLD CALC: 23 % (ref 40.7–50.3)
HCT VFR BLD CALC: 23.3 %
HEMOGLOBIN: 6.9 G/DL (ref 13–17)
HEMOGLOBIN: 7.4 G/DL (ref 13–17)
HEMOGLOBIN: 7.5 GM/DL
IMMATURE GRANULOCYTES: 2 %
IMMATURE GRANULOCYTES: 3 %
INR BLD: 1.1
LYMPHOCYTES # BLD: 11 % (ref 24–44)
LYMPHOCYTES # BLD: 18 % (ref 24–43)
MAGNESIUM: 2.1 MG/DL (ref 1.6–2.6)
MCH RBC QN AUTO: 31.8 PG (ref 25.2–33.5)
MCH RBC QN AUTO: 32 PG (ref 25.2–33.5)
MCHC RBC AUTO-ENTMCNC: 31.8 G/DL (ref 28.4–34.8)
MCHC RBC AUTO-ENTMCNC: 32.2 G/DL (ref 28.4–34.8)
MCV RBC AUTO: 100 FL (ref 82.6–102.9)
MCV RBC AUTO: 99.6 FL (ref 82.6–102.9)
METHEMOGLOBIN: ABNORMAL % (ref 0–1.5)
MODE: ABNORMAL
MODE: ABNORMAL
MODE: NORMAL
MONOCYTES # BLD: 12 % (ref 3–12)
MONOCYTES # BLD: 9 % (ref 1–7)
MORPHOLOGY: ABNORMAL
NEGATIVE BASE EXCESS, ART: 0.5 MMOL/L (ref 0–2)
NEGATIVE BASE EXCESS, ART: ABNORMAL (ref 0–2)
NEGATIVE BASE EXCESS, ART: NORMAL (ref 0–2)
NOTIFICATION TIME: ABNORMAL
NOTIFICATION: ABNORMAL
NRBC AUTOMATED: 0.9 PER 100 WBC
NRBC AUTOMATED: 1.4 PER 100 WBC
NUCLEATED RED BLOOD CELLS: 2 PER 100 WBC
O2 DEVICE/FLOW/%: ABNORMAL
O2 DEVICE/FLOW/%: ABNORMAL
O2 DEVICE/FLOW/%: NORMAL
O2 SAT, ARTERIAL: 99.6 % (ref 94–100)
OXYHEMOGLOBIN: ABNORMAL % (ref 95–98)
PARTIAL THROMBOPLASTIN TIME: 23.7 SEC (ref 20.5–30.5)
PATIENT TEMP: 37
PATIENT TEMP: ABNORMAL
PATIENT TEMP: NORMAL
PCO2 ARTERIAL: 46.3 MMHG (ref 32–45)
PCO2, ART, TEMP ADJ: ABNORMAL (ref 32–45)
PDW BLD-RTO: 14.5 % (ref 11.8–14.4)
PDW BLD-RTO: 15.5 % (ref 11.8–14.4)
PEEP/CPAP: ABNORMAL
PH ARTERIAL: 7.34 (ref 7.35–7.45)
PH, ART, TEMP ADJ: ABNORMAL (ref 7.35–7.45)
PHOSPHORUS: 2.2 MG/DL (ref 2.5–4.5)
PLATELET # BLD: 51 K/UL (ref 138–453)
PLATELET # BLD: 70 K/UL (ref 138–453)
PLATELET # BLD: 72 K/UL (ref 138–453)
PLATELET ESTIMATE: ABNORMAL
PLATELET ESTIMATE: ABNORMAL
PMV BLD AUTO: 9.9 FL (ref 8.1–13.5)
PMV BLD AUTO: 9.9 FL (ref 8.1–13.5)
PO2 ARTERIAL: 253 MMHG (ref 75–95)
PO2, ART, TEMP ADJ: ABNORMAL MMHG (ref 75–95)
POC HCO3: 25 MMOL/L (ref 21–28)
POC HCO3: 26 MMOL/L (ref 21–28)
POC LACTIC ACID: 0.88 MMOL/L (ref 0.56–1.39)
POC LACTIC ACID: 1.02 MMOL/L (ref 0.56–1.39)
POC O2 SATURATION: 94 % (ref 94–98)
POC O2 SATURATION: 98 % (ref 94–98)
POC PCO2 TEMP: ABNORMAL MM HG
POC PCO2 TEMP: NORMAL MM HG
POC PCO2: 37 MM HG (ref 35–48)
POC PCO2: 45.6 MM HG (ref 35–48)
POC PH TEMP: ABNORMAL
POC PH TEMP: NORMAL
POC PH: 7.37 (ref 7.35–7.45)
POC PH: 7.44 (ref 7.35–7.45)
POC PO2 TEMP: ABNORMAL MM HG
POC PO2 TEMP: NORMAL MM HG
POC PO2: 75.9 MM HG (ref 83–108)
POC PO2: 95.5 MM HG (ref 83–108)
POSITIVE BASE EXCESS, ART: 0 (ref 0–3)
POSITIVE BASE EXCESS, ART: 1 (ref 0–3)
POSITIVE BASE EXCESS, ART: ABNORMAL MMOL/L (ref 0–2)
POTASSIUM SERPL-SCNC: 3.8 MMOL/L (ref 3.7–5.3)
POTASSIUM SERPL-SCNC: 4.1 MMOL/L (ref 3.7–5.3)
POTASSIUM, WHOLE BLOOD: 3.7 MMOL/L (ref 3.6–5)
PROTHROMBIN TIME: 11.3 SEC (ref 9.1–12.3)
PSV: ABNORMAL
PT. POSITION: ABNORMAL
RBC # BLD: 2.17 M/UL (ref 4.21–5.77)
RBC # BLD: 2.31 M/UL (ref 4.21–5.77)
RBC # BLD: ABNORMAL 10*6/UL
RBC # BLD: ABNORMAL 10*6/UL
RESPIRATORY RATE: ABNORMAL
SAMPLE SITE: ABNORMAL
SAMPLE SITE: ABNORMAL
SAMPLE SITE: NORMAL
SEG NEUTROPHILS: 66 % (ref 36–65)
SEG NEUTROPHILS: 75 % (ref 36–66)
SEGMENTED NEUTROPHILS ABSOLUTE COUNT: 2.17 K/UL (ref 1.8–7.7)
SEGMENTED NEUTROPHILS ABSOLUTE COUNT: 2.3 K/UL (ref 1.5–8.1)
SET RATE: ABNORMAL
SODIUM BLD-SCNC: 143 MMOL/L (ref 135–144)
SODIUM BLD-SCNC: 144 MMOL/L (ref 135–144)
SODIUM, WHOLE BLOOD: 146 MMOL/L (ref 136–145)
TCO2 (CALC), ART: 26 MMOL/L (ref 22–29)
TCO2 (CALC), ART: 27 MMOL/L (ref 22–29)
TEXT FOR RESPIRATORY: ABNORMAL
TOTAL HB: ABNORMAL G/DL (ref 12–16)
TOTAL PROTEIN: 4.8 G/DL (ref 6.4–8.3)
TOTAL RATE: ABNORMAL
TRANSFUSION STATUS: NORMAL
UNIT DIVISION: 0
UNIT NUMBER: NORMAL
VT: ABNORMAL
WBC # BLD: 2.9 K/UL (ref 3.5–11.3)
WBC # BLD: 3.5 K/UL (ref 3.5–11.3)
WBC # BLD: ABNORMAL 10*3/UL
WBC # BLD: ABNORMAL 10*3/UL

## 2021-04-09 PROCEDURE — 3700000001 HC ADD 15 MINUTES (ANESTHESIA): Performed by: ORTHOPAEDIC SURGERY

## 2021-04-09 PROCEDURE — 6360000002 HC RX W HCPCS: Performed by: STUDENT IN AN ORGANIZED HEALTH CARE EDUCATION/TRAINING PROGRAM

## 2021-04-09 PROCEDURE — 6360000002 HC RX W HCPCS: Performed by: ORTHOPAEDIC SURGERY

## 2021-04-09 PROCEDURE — 2580000003 HC RX 258: Performed by: NURSE PRACTITIONER

## 2021-04-09 PROCEDURE — 86850 RBC ANTIBODY SCREEN: CPT

## 2021-04-09 PROCEDURE — 71045 X-RAY EXAM CHEST 1 VIEW: CPT

## 2021-04-09 PROCEDURE — 94640 AIRWAY INHALATION TREATMENT: CPT

## 2021-04-09 PROCEDURE — 2580000003 HC RX 258: Performed by: ORTHOPAEDIC SURGERY

## 2021-04-09 PROCEDURE — 6370000000 HC RX 637 (ALT 250 FOR IP): Performed by: ORTHOPAEDIC SURGERY

## 2021-04-09 PROCEDURE — 82330 ASSAY OF CALCIUM: CPT

## 2021-04-09 PROCEDURE — 85610 PROTHROMBIN TIME: CPT

## 2021-04-09 PROCEDURE — 2709999900 HC NON-CHARGEABLE SUPPLY: Performed by: ORTHOPAEDIC SURGERY

## 2021-04-09 PROCEDURE — 86900 BLOOD TYPING SEROLOGIC ABO: CPT

## 2021-04-09 PROCEDURE — 6370000000 HC RX 637 (ALT 250 FOR IP): Performed by: NURSE PRACTITIONER

## 2021-04-09 PROCEDURE — 3209999900 FLUORO FOR SURGICAL PROCEDURES

## 2021-04-09 PROCEDURE — 37799 UNLISTED PX VASCULAR SURGERY: CPT

## 2021-04-09 PROCEDURE — 85384 FIBRINOGEN ACTIVITY: CPT

## 2021-04-09 PROCEDURE — 82805 BLOOD GASES W/O2 SATURATION: CPT

## 2021-04-09 PROCEDURE — 99291 CRITICAL CARE FIRST HOUR: CPT | Performed by: NEUROLOGICAL SURGERY

## 2021-04-09 PROCEDURE — 3700000000 HC ANESTHESIA ATTENDED CARE: Performed by: ORTHOPAEDIC SURGERY

## 2021-04-09 PROCEDURE — 2720000010 HC SURG SUPPLY STERILE: Performed by: ORTHOPAEDIC SURGERY

## 2021-04-09 PROCEDURE — APPSS45 APP SPLIT SHARED TIME 31-45 MINUTES: Performed by: PHYSICIAN ASSISTANT

## 2021-04-09 PROCEDURE — 86920 COMPATIBILITY TEST SPIN: CPT

## 2021-04-09 PROCEDURE — 36415 COLL VENOUS BLD VENIPUNCTURE: CPT

## 2021-04-09 PROCEDURE — 85014 HEMATOCRIT: CPT

## 2021-04-09 PROCEDURE — 2500000003 HC RX 250 WO HCPCS: Performed by: NURSE ANESTHETIST, CERTIFIED REGISTERED

## 2021-04-09 PROCEDURE — 6360000002 HC RX W HCPCS: Performed by: NURSE ANESTHETIST, CERTIFIED REGISTERED

## 2021-04-09 PROCEDURE — 6360000002 HC RX W HCPCS: Performed by: PHYSICIAN ASSISTANT

## 2021-04-09 PROCEDURE — 82947 ASSAY GLUCOSE BLOOD QUANT: CPT

## 2021-04-09 PROCEDURE — 2500000003 HC RX 250 WO HCPCS: Performed by: NURSE PRACTITIONER

## 2021-04-09 PROCEDURE — P9016 RBC LEUKOCYTES REDUCED: HCPCS

## 2021-04-09 PROCEDURE — 86901 BLOOD TYPING SEROLOGIC RH(D): CPT

## 2021-04-09 PROCEDURE — 2580000003 HC RX 258: Performed by: STUDENT IN AN ORGANIZED HEALTH CARE EDUCATION/TRAINING PROGRAM

## 2021-04-09 PROCEDURE — 85018 HEMOGLOBIN: CPT

## 2021-04-09 PROCEDURE — 82803 BLOOD GASES ANY COMBINATION: CPT

## 2021-04-09 PROCEDURE — C1713 ANCHOR/SCREW BN/BN,TIS/BN: HCPCS | Performed by: ORTHOPAEDIC SURGERY

## 2021-04-09 PROCEDURE — 85049 AUTOMATED PLATELET COUNT: CPT

## 2021-04-09 PROCEDURE — 85730 THROMBOPLASTIN TIME PARTIAL: CPT

## 2021-04-09 PROCEDURE — 3600000004 HC SURGERY LEVEL 4 BASE: Performed by: ORTHOPAEDIC SURGERY

## 2021-04-09 PROCEDURE — 0QS804Z REPOSITION RIGHT FEMORAL SHAFT WITH INTERNAL FIXATION DEVICE, OPEN APPROACH: ICD-10-PCS | Performed by: ORTHOPAEDIC SURGERY

## 2021-04-09 PROCEDURE — 61107 TDH PNXR IMPLT VENTR CATH: CPT | Performed by: NEUROLOGICAL SURGERY

## 2021-04-09 PROCEDURE — 2700000000 HC OXYGEN THERAPY PER DAY

## 2021-04-09 PROCEDURE — 70450 CT HEAD/BRAIN W/O DYE: CPT

## 2021-04-09 PROCEDURE — 83605 ASSAY OF LACTIC ACID: CPT

## 2021-04-09 PROCEDURE — 6370000000 HC RX 637 (ALT 250 FOR IP): Performed by: STUDENT IN AN ORGANIZED HEALTH CARE EDUCATION/TRAINING PROGRAM

## 2021-04-09 PROCEDURE — 2580000003 HC RX 258: Performed by: NURSE ANESTHETIST, CERTIFIED REGISTERED

## 2021-04-09 PROCEDURE — P9037 PLATE PHERES LEUKOREDU IRRAD: HCPCS

## 2021-04-09 PROCEDURE — 85025 COMPLETE CBC W/AUTO DIFF WBC: CPT

## 2021-04-09 PROCEDURE — 2500000003 HC RX 250 WO HCPCS: Performed by: STUDENT IN AN ORGANIZED HEALTH CARE EDUCATION/TRAINING PROGRAM

## 2021-04-09 PROCEDURE — 6360000002 HC RX W HCPCS: Performed by: NURSE PRACTITIONER

## 2021-04-09 PROCEDURE — 94761 N-INVAS EAR/PLS OXIMETRY MLT: CPT

## 2021-04-09 PROCEDURE — 84132 ASSAY OF SERUM POTASSIUM: CPT

## 2021-04-09 PROCEDURE — 2580000003 HC RX 258: Performed by: PHYSICIAN ASSISTANT

## 2021-04-09 PROCEDURE — 99232 SBSQ HOSP IP/OBS MODERATE 35: CPT | Performed by: INTERNAL MEDICINE

## 2021-04-09 PROCEDURE — 94003 VENT MGMT INPAT SUBQ DAY: CPT

## 2021-04-09 PROCEDURE — 84100 ASSAY OF PHOSPHORUS: CPT

## 2021-04-09 PROCEDURE — 83735 ASSAY OF MAGNESIUM: CPT

## 2021-04-09 PROCEDURE — 6360000002 HC RX W HCPCS: Performed by: NEUROLOGICAL SURGERY

## 2021-04-09 PROCEDURE — 6360000002 HC RX W HCPCS: Performed by: REGISTERED NURSE

## 2021-04-09 PROCEDURE — 3600000014 HC SURGERY LEVEL 4 ADDTL 15MIN: Performed by: ORTHOPAEDIC SURGERY

## 2021-04-09 PROCEDURE — 80053 COMPREHEN METABOLIC PANEL: CPT

## 2021-04-09 PROCEDURE — 80048 BASIC METABOLIC PNL TOTAL CA: CPT

## 2021-04-09 PROCEDURE — 27506 TREATMENT OF THIGH FRACTURE: CPT | Performed by: ORTHOPAEDIC SURGERY

## 2021-04-09 PROCEDURE — 82140 ASSAY OF AMMONIA: CPT

## 2021-04-09 PROCEDURE — 2500000003 HC RX 250 WO HCPCS: Performed by: ORTHOPAEDIC SURGERY

## 2021-04-09 PROCEDURE — 36430 TRANSFUSION BLD/BLD COMPNT: CPT

## 2021-04-09 PROCEDURE — P9073 PLATELETS PHERESIS PATH REDU: HCPCS

## 2021-04-09 PROCEDURE — 2000000000 HC ICU R&B

## 2021-04-09 PROCEDURE — 73552 X-RAY EXAM OF FEMUR 2/>: CPT

## 2021-04-09 DEVICE — IMPLANTABLE DEVICE: Type: IMPLANTABLE DEVICE | Site: FEMUR | Status: FUNCTIONAL

## 2021-04-09 DEVICE — SCREW BNE L34MM DIA5MM NONSTERILE TIB LT GRN TI ST CANN LOK: Type: IMPLANTABLE DEVICE | Site: FEMUR | Status: FUNCTIONAL

## 2021-04-09 DEVICE — SCREW BNE L52MM DIA5MM NONSTERILE TIB LT GRN TI ST CANN LOK: Type: IMPLANTABLE DEVICE | Site: FEMUR | Status: FUNCTIONAL

## 2021-04-09 RX ORDER — LIDOCAINE HYDROCHLORIDE 10 MG/ML
INJECTION, SOLUTION EPIDURAL; INFILTRATION; INTRACAUDAL; PERINEURAL PRN
Status: DISCONTINUED | OUTPATIENT
Start: 2021-04-09 | End: 2021-04-09 | Stop reason: SDUPTHER

## 2021-04-09 RX ORDER — FENTANYL CITRATE 50 UG/ML
INJECTION, SOLUTION INTRAMUSCULAR; INTRAVENOUS PRN
Status: DISCONTINUED | OUTPATIENT
Start: 2021-04-09 | End: 2021-04-09 | Stop reason: SDUPTHER

## 2021-04-09 RX ORDER — SODIUM CHLORIDE 9 MG/ML
INJECTION, SOLUTION INTRAVENOUS PRN
Status: DISCONTINUED | OUTPATIENT
Start: 2021-04-09 | End: 2021-04-09

## 2021-04-09 RX ORDER — ROCURONIUM BROMIDE 10 MG/ML
INJECTION, SOLUTION INTRAVENOUS PRN
Status: DISCONTINUED | OUTPATIENT
Start: 2021-04-09 | End: 2021-04-09 | Stop reason: SDUPTHER

## 2021-04-09 RX ORDER — FENTANYL CITRATE 50 UG/ML
25 INJECTION, SOLUTION INTRAMUSCULAR; INTRAVENOUS ONCE
Status: COMPLETED | OUTPATIENT
Start: 2021-04-09 | End: 2021-04-09

## 2021-04-09 RX ORDER — PROPOFOL 10 MG/ML
INJECTION, EMULSION INTRAVENOUS PRN
Status: DISCONTINUED | OUTPATIENT
Start: 2021-04-09 | End: 2021-04-09 | Stop reason: SDUPTHER

## 2021-04-09 RX ORDER — SODIUM CHLORIDE 9 MG/ML
INJECTION, SOLUTION INTRAVENOUS CONTINUOUS PRN
Status: DISCONTINUED | OUTPATIENT
Start: 2021-04-09 | End: 2021-04-09 | Stop reason: SDUPTHER

## 2021-04-09 RX ORDER — MAGNESIUM HYDROXIDE 1200 MG/15ML
LIQUID ORAL CONTINUOUS PRN
Status: COMPLETED | OUTPATIENT
Start: 2021-04-09 | End: 2021-04-09

## 2021-04-09 RX ADMIN — SPIRONOLACTONE 50 MG: 25 TABLET ORAL at 10:52

## 2021-04-09 RX ADMIN — LIDOCAINE HYDROCHLORIDE 50 MG: 10 INJECTION, SOLUTION EPIDURAL; INFILTRATION; INTRACAUDAL; PERINEURAL at 15:16

## 2021-04-09 RX ADMIN — SODIUM CHLORIDE, PRESERVATIVE FREE 10 ML: 5 INJECTION INTRAVENOUS at 10:50

## 2021-04-09 RX ADMIN — SODIUM CHLORIDE, PRESERVATIVE FREE 10 ML: 5 INJECTION INTRAVENOUS at 20:00

## 2021-04-09 RX ADMIN — CHLORHEXIDINE GLUCONATE 0.12% ORAL RINSE 15 ML: 1.2 LIQUID ORAL at 11:00

## 2021-04-09 RX ADMIN — LEVETIRACETAM 500 MG: 100 SOLUTION ORAL at 10:51

## 2021-04-09 RX ADMIN — PHENYLEPHRINE HYDROCHLORIDE 10 MCG/MIN: 10 INJECTION INTRAVENOUS at 15:27

## 2021-04-09 RX ADMIN — PROPOFOL 100 MG: 10 INJECTION, EMULSION INTRAVENOUS at 15:16

## 2021-04-09 RX ADMIN — DEXTROSE MONOHYDRATE 2000 MG: 50 INJECTION, SOLUTION INTRAVENOUS at 22:15

## 2021-04-09 RX ADMIN — FENTANYL CITRATE 50 MCG: 50 INJECTION, SOLUTION INTRAMUSCULAR; INTRAVENOUS at 18:45

## 2021-04-09 RX ADMIN — FENTANYL CITRATE 50 MCG: 50 INJECTION, SOLUTION INTRAMUSCULAR; INTRAVENOUS at 15:01

## 2021-04-09 RX ADMIN — CEFAZOLIN 2000 MG: 10 INJECTION, POWDER, FOR SOLUTION INTRAVENOUS at 15:22

## 2021-04-09 RX ADMIN — BACITRACIN: 500 OINTMENT TOPICAL at 20:13

## 2021-04-09 RX ADMIN — FENTANYL CITRATE 50 MCG: 50 INJECTION, SOLUTION INTRAMUSCULAR; INTRAVENOUS at 15:34

## 2021-04-09 RX ADMIN — SODIUM CHLORIDE: 9 INJECTION, SOLUTION INTRAVENOUS at 03:03

## 2021-04-09 RX ADMIN — LACTULOSE 20 G: 10 SOLUTION ORAL at 10:51

## 2021-04-09 RX ADMIN — FUROSEMIDE 20 MG: 20 TABLET ORAL at 20:01

## 2021-04-09 RX ADMIN — FUROSEMIDE 20 MG: 20 TABLET ORAL at 11:00

## 2021-04-09 RX ADMIN — PROPOFOL 20 MCG/KG/MIN: 10 INJECTION, EMULSION INTRAVENOUS at 10:38

## 2021-04-09 RX ADMIN — INSULIN LISPRO 3 UNITS: 100 INJECTION, SOLUTION INTRAVENOUS; SUBCUTANEOUS at 13:26

## 2021-04-09 RX ADMIN — SODIUM CHLORIDE: 9 INJECTION, SOLUTION INTRAVENOUS at 12:57

## 2021-04-09 RX ADMIN — PROPOFOL 25 MCG/KG/MIN: 10 INJECTION, EMULSION INTRAVENOUS at 03:03

## 2021-04-09 RX ADMIN — IPRATROPIUM BROMIDE AND ALBUTEROL SULFATE 1 AMPULE: .5; 3 SOLUTION RESPIRATORY (INHALATION) at 03:40

## 2021-04-09 RX ADMIN — SODIUM PHOSPHATE, MONOBASIC, MONOHYDRATE 20 MMOL: 276; 142 INJECTION, SOLUTION INTRAVENOUS at 13:10

## 2021-04-09 RX ADMIN — IPRATROPIUM BROMIDE AND ALBUTEROL SULFATE 1 AMPULE: .5; 3 SOLUTION RESPIRATORY (INHALATION) at 11:13

## 2021-04-09 RX ADMIN — INSULIN LISPRO 3 UNITS: 100 INJECTION, SOLUTION INTRAVENOUS; SUBCUTANEOUS at 00:08

## 2021-04-09 RX ADMIN — FAMOTIDINE 20 MG: 10 INJECTION INTRAVENOUS at 10:52

## 2021-04-09 RX ADMIN — FENTANYL CITRATE 25 MCG: 50 INJECTION, SOLUTION INTRAMUSCULAR; INTRAVENOUS at 09:37

## 2021-04-09 RX ADMIN — FENTANYL CITRATE 50 MCG: 50 INJECTION, SOLUTION INTRAMUSCULAR; INTRAVENOUS at 15:55

## 2021-04-09 RX ADMIN — LEVETIRACETAM 500 MG: 100 SOLUTION ORAL at 20:02

## 2021-04-09 RX ADMIN — SODIUM CHLORIDE: 9 INJECTION, SOLUTION INTRAVENOUS at 15:08

## 2021-04-09 RX ADMIN — FENTANYL CITRATE 50 MCG: 50 INJECTION, SOLUTION INTRAMUSCULAR; INTRAVENOUS at 21:22

## 2021-04-09 RX ADMIN — IPRATROPIUM BROMIDE AND ALBUTEROL SULFATE 1 AMPULE: .5; 3 SOLUTION RESPIRATORY (INHALATION) at 07:49

## 2021-04-09 RX ADMIN — ACETAMINOPHEN 1000 MG: 650 SOLUTION ORAL at 11:12

## 2021-04-09 RX ADMIN — CHLORHEXIDINE GLUCONATE 0.12% ORAL RINSE 15 ML: 1.2 LIQUID ORAL at 20:40

## 2021-04-09 RX ADMIN — INSULIN LISPRO 3 UNITS: 100 INJECTION, SOLUTION INTRAVENOUS; SUBCUTANEOUS at 04:23

## 2021-04-09 RX ADMIN — FAMOTIDINE 20 MG: 10 INJECTION INTRAVENOUS at 20:01

## 2021-04-09 RX ADMIN — ROCURONIUM BROMIDE 50 MG: 10 INJECTION INTRAVENOUS at 15:16

## 2021-04-09 RX ADMIN — DEXTROSE MONOHYDRATE 2000 MG: 50 INJECTION, SOLUTION INTRAVENOUS at 09:12

## 2021-04-09 RX ADMIN — PROPOFOL 25 MCG/KG/MIN: 10 INJECTION, EMULSION INTRAVENOUS at 20:54

## 2021-04-09 RX ADMIN — LACTULOSE 20 G: 10 SOLUTION ORAL at 20:01

## 2021-04-09 RX ADMIN — FENTANYL CITRATE 50 MCG: 50 INJECTION, SOLUTION INTRAMUSCULAR; INTRAVENOUS at 12:14

## 2021-04-09 RX ADMIN — IPRATROPIUM BROMIDE AND ALBUTEROL SULFATE 1 AMPULE: .5; 3 SOLUTION RESPIRATORY (INHALATION) at 23:15

## 2021-04-09 RX ADMIN — INSULIN LISPRO 3 UNITS: 100 INJECTION, SOLUTION INTRAVENOUS; SUBCUTANEOUS at 10:46

## 2021-04-09 RX ADMIN — BACITRACIN: 500 OINTMENT TOPICAL at 10:50

## 2021-04-09 RX ADMIN — IPRATROPIUM BROMIDE AND ALBUTEROL SULFATE 1 AMPULE: .5; 3 SOLUTION RESPIRATORY (INHALATION) at 19:40

## 2021-04-09 ASSESSMENT — PULMONARY FUNCTION TESTS
PIF_VALUE: 22
PIF_VALUE: 21
PIF_VALUE: 26
PIF_VALUE: 22
PIF_VALUE: 22
PIF_VALUE: 23
PIF_VALUE: 23
PIF_VALUE: 19
PIF_VALUE: 6
PIF_VALUE: 23
PIF_VALUE: 23
PIF_VALUE: 21
PIF_VALUE: 22
PIF_VALUE: 18
PIF_VALUE: 22
PIF_VALUE: 21
PIF_VALUE: 23
PIF_VALUE: 19
PIF_VALUE: 21
PIF_VALUE: 23
PIF_VALUE: 23
PIF_VALUE: 36
PIF_VALUE: 23
PIF_VALUE: 30
PIF_VALUE: 21
PIF_VALUE: 29
PIF_VALUE: 23
PIF_VALUE: 2
PIF_VALUE: 23
PIF_VALUE: 21
PIF_VALUE: 22
PIF_VALUE: 23
PIF_VALUE: 22
PIF_VALUE: 23
PIF_VALUE: 19
PIF_VALUE: 23
PIF_VALUE: 22
PIF_VALUE: 18
PIF_VALUE: 23
PIF_VALUE: 21
PIF_VALUE: 22
PIF_VALUE: 38
PIF_VALUE: 22
PIF_VALUE: 23
PIF_VALUE: 20
PIF_VALUE: 23
PIF_VALUE: 11
PIF_VALUE: 23
PIF_VALUE: 25
PIF_VALUE: 18
PIF_VALUE: 20
PIF_VALUE: 22
PIF_VALUE: 23
PIF_VALUE: 21
PIF_VALUE: 23
PIF_VALUE: 22
PIF_VALUE: 21
PIF_VALUE: 23
PIF_VALUE: 19
PIF_VALUE: 23
PIF_VALUE: 23
PIF_VALUE: 22
PIF_VALUE: 22
PIF_VALUE: 23
PIF_VALUE: 24
PIF_VALUE: 21
PIF_VALUE: 23
PIF_VALUE: 21
PIF_VALUE: 21

## 2021-04-09 NOTE — OP NOTE
Operative Note       Patient: America Solomon  YOB: 1950  MRN: [id number]     Date of Procedure: 4/9/2021     Pre-Op Diagnosis: RIGHT FEMUR FRACTURE     Post-Op Diagnosis: Same       Procedure(s):  INSERTION RETROGRADE NAIL FEMUR, REMOVAL OF SKELETAL TRACTION , SYNTHES,, C-ARM     Surgeon(s):  Nidhi Cee DO     Assistant:  Resident: Yonny Cortes DO; Ophelia Bragg DO     Anesthesia: General     Estimated Blood Loss (mL): 100 mL      Fluids: 300 ml Crystalloids     Complications: None     Specimens:   * No specimens in log *     Implants:  Implant Name Type Inv. Item Serial No.  Lot No. LRB No. Used Action   NAIL IM L400MM [id number] UNIV FEM GRN TI JAIME REMY   NAIL IM L400MM [id number] UNIV FEM GRN TI JAIME REMY   DEPU[employer] [id number] Right 1 Implanted   SCREW BNE L34MM DIA5MM NONSTERILE TIB LT GRN TI ST JAIME REMY   SCREW BNE L34MM DIA5MM NONSTERILE TIB LT GRN TI ST JAIME REMY   [employer]PU[employer]NTH[employer]WD   Right 1 Implanted   SCREW BNE L85MM DIA5MM TIB LT GRN TI ST JAIME REMY FULL THRD   SCREW BNE L85MM DIA5MM TIB LT GRN TI ST JAIME REMY FULL THRD   [employer]PU[employer] USA[employer]WD   Right 1 Implanted   SCREW BNE L52MM DIA5MM NONSTERILE TIB LT GRN TI ST JAIME REMY   SCREW BNE L52MM DIA5MM NONSTERILE TIB LT GRN TI ST JAIME REMY   DEPUY SYNTHES [employer]WD   Right 1 Implanted          Drains:        ICP monitor (Active)   Status To Pressure Monitoring 04/09/21 1200   Dressing Status Dry; Intact 04/09/21 1200   Dressing Type Other (Comment) 04/09/21 1200   Site Assessment Other (Comment) 04/09/21 1200   Drainage No Drainage 04/09/21 1200       NG/OG/NJ/NE Tube Orogastric Center mouth (Active)   Surrounding Skin Dry; Intact 04/09/21 1200   Securement device Yes 04/09/21 1200   Status Clamped 04/09/21 1200   Placement Verified by External Catheter Length;by Gastric Contents 04/09/21 1200   NG/OG/NJ/NE External Measurement (cm) 63 cm 04/09/21 1200   Drainage Appearance Bile; Tan 04/07/21 1600   Tube Feeding Immune level of the peritenon where it was incised exposing the patellar tendon. Using finger manipulation the patella tendon was approximated and a incision was created at the center of the patella tendon longitudinally. A guidewire was placed into the trochlea and starting point was confirmed on fluoroscopy which is noted to be the center of the femoral notch in the AP view and anterior to blumensaats on the lateral view. At this time the guide pin was advanced. When we felt we were in the appropriate place on both AP and lateral we utilized the opening reamer to gain entry to the femoral canal. At this time all instruments were removed and the finger reduction tool was inserted and advanced . Anatomic reduction was maintained and confirmed on fluoroscopy. A guidewire was placed into the reduction tool across the fracture site and seated in the lesser trochanter. We measured the approximate length to be used for the nail. We then began reaming. Starting with an 8mm reamer we gradually reamed up to a reamer sized 1.5mm greater than our nail would be. At this time the reamers were removed and we inserted our nail. We confirmed that the nail was appropriately seated on c-arm and began preparing to insert the proximal nail screw using free hand technique. We then turned our attention to the distal femoral nail screws. Trochars were put onto the guide arm and locked in place. Guide pins were drilled into place into the distal femur. We measured the length of each screw. We then drilled and inserted the screws. At this time all instrumentation was removed. At this time we obtained final images confirming a stable anatomic reduction. The wounds were thoroughly irrigated. Patellar tendon was closed with 1 vicryl. Subcutaneous tissue closed with 2-0 vicryl and the skin was closed with staples. Sterile xeroform, 4x4s, and tegaderm were applied as dressing. Patient was moved back over to the hospital bed.  Patient remained intubated. At this time the patient was wheeled back to the recovery unit in stable condition. Dr. Flako Duque was present for and active in all critical aspects of the case.
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
## 2021-04-09 NOTE — PROGRESS NOTES
4 units platelets given - Neurosurgery at bedside for bolt procedure - Pt tolerated well - Spouse cathy called and updated on plans for today - all questions answered
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
## 2021-04-09 NOTE — PLAN OF CARE
Problem: OXYGENATION/RESPIRATORY FUNCTION  Goal: Patient will maintain patent airway  4/9/2021 0920 by Max Lira RCP  Outcome: Ongoing  Goal: Patient will achieve/maintain normal respiratory rate/effort  4/9/2021 0920 by Max Lira RCP  Outcome: Ongoing     Problem: MECHANICAL VENTILATION  Goal: Patient will maintain patent airway  4/9/2021 0920 by Max Lira RCP  Outcome: Ongoing  Goal: Oral health is maintained or improved  4/9/2021 0920 by Max Lira RCP  Outcome: Ongoing  Goal: ET tube will be managed safely  4/9/2021 0920 by Max Lira RCP  Outcome: Ongoing  Goal: Ability to express needs and understand communication  4/9/2021 0920 by Max Lira RCP  Outcome: Ongoing  Goal: Mobility/activity is maintained at optimum level for patient  4/9/2021 0920 by Max Lira RCP  Outcome: Ongoing     Problem: SKIN INTEGRITY  Goal: Skin integrity is maintained or improved  4/9/2021 0920 by Max Lira RCP  Outcome: Ongoing     Problem: Non-Violent Restraints  Goal: Removal from restraints as soon as assessed to be safe  Outcome: Completed  Goal: No harm/injury to patient while restraints in use  Outcome: Completed  Goal: Patient's dignity will be maintained  Outcome: Completed

## 2021-04-09 NOTE — PROGRESS NOTES
Writer called family to update no answer    Will continue to try     . Electronically signed by Mary Combs RN on 4/9/2021 at 7:46 PM

## 2021-04-09 NOTE — PROGRESS NOTES
Occupational Therapy Not Seen Note    DATE: 2021  Name: Sharon Fernández  : 1950  MRN: [id number]    Patient not available for Occupational Therapy due to:    Sedation: propofol / intubated / OR with ortho today    Next Scheduled Treatment: 04/10/2021    Electronically signed by KISHOR Toribio on 2021 at 8:03 AM

## 2021-04-09 NOTE — PLAN OF CARE
Problem: MECHANICAL VENTILATION  Goal: Oral health is maintained or improved  Outcome: Met This Shift  Goal: ET tube will be managed safely  Outcome: Met This Shift     Problem: SKIN INTEGRITY  Goal: Skin integrity is maintained or improved  Outcome: Met This Shift     Problem: Falls - Risk of:  Goal: Will remain free from falls  Description: Will remain free from falls  Outcome: Met This Shift  Goal: Absence of physical injury  Description: Absence of physical injury  Outcome: Met This Shift     Problem: Injury - Risk of, Physical Injury:  Goal: Will remain free from falls  Description: Will remain free from falls  Outcome: Met This Shift  Goal: Absence of physical injury  Description: Absence of physical injury  Outcome: Met This Shift     Problem: Skin Integrity:  Goal: Will show no infection signs and symptoms  Description: Will show no infection signs and symptoms  Outcome: Met This Shift  Goal: Absence of new skin breakdown  Description: Absence of new skin breakdown  Outcome: Met This Shift     Problem: Infection - Surgical Site:  Goal: Will show no infection signs and symptoms  Description: Will show no infection signs and symptoms  Outcome: Met This Shift     Problem: Non-Violent Restraints  Goal: No harm/injury to patient while restraints in use  Outcome: Met This Shift  Goal: Patient's dignity will be maintained  Outcome: Met This Shift     Problem: OXYGENATION/RESPIRATORY FUNCTION  Goal: Patient will maintain patent airway  Outcome: Ongoing  Goal: Patient will achieve/maintain normal respiratory rate/effort  Description: Respiratory rate and effort will be within normal limits for the patient  Outcome: Ongoing     Problem: MECHANICAL VENTILATION  Goal: Patient will maintain patent airway  Outcome: Ongoing  Goal: Ability to express needs and understand communication  Outcome: Ongoing  Goal: Mobility/activity is maintained at optimum level for patient  Outcome: Ongoing     Problem: Confusion - Acute:  Goal: Absence of continued neurological deterioration signs and symptoms  Description: Absence of continued neurological deterioration signs and symptoms  Outcome: Ongoing  Goal: Mental status will be restored to baseline  Description: Mental status will be restored to baseline  Outcome: Ongoing     Problem: Discharge Planning:  Goal: Ability to perform activities of daily living will improve  Description: Ability to perform activities of daily living will improve  Outcome: Ongoing  Goal: Participates in care planning  Description: Participates in care planning  Outcome: Ongoing  Goal: Discharged to appropriate level of care  Description: Discharged to appropriate level of care  Outcome: Ongoing     Problem: Mood - Altered:  Goal: Mood stable  Description: Mood stable  Outcome: Ongoing  Goal: Absence of abusive behavior  Description: Absence of abusive behavior  Outcome: Ongoing  Goal: Verbalizations of feeling emotionally comfortable while being cared for will increase  Description: Verbalizations of feeling emotionally comfortable while being cared for will increase  Outcome: Ongoing     Problem: Sleep Pattern Disturbance:  Goal: Appears well-rested  Description: Appears well-rested  Outcome: Ongoing     Problem: Injury - Risk of, Postfracture Complications:  Goal: Absence of fat embolism  Description: Absence of fat embolism  Outcome: Ongoing  Goal: Absence of compartment syndrome signs and symptoms  Description: Absence of compartment syndrome signs and symptoms  Outcome: Ongoing     Problem: Non-Violent Restraints  Goal: Removal from restraints as soon as assessed to be safe  Outcome: Not Met This Shift
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
## 2021-04-09 NOTE — PLAN OF CARE
Problem: OXYGENATION/RESPIRATORY FUNCTION  Goal: Patient will maintain patent airway  4/9/2021 0920 by Will Dugan RCP  Outcome: Ongoing  4/9/2021 0307 by Fletcher Crigler, RN  Outcome: Ongoing     Problem: OXYGENATION/RESPIRATORY FUNCTION  Goal: Patient will achieve/maintain normal respiratory rate/effort  Description: Respiratory rate and effort will be within normal limits for the patient  4/9/2021 0920 by Will Dugan RCP  Outcome: Ongoing  4/9/2021 0307 by Fletcher Crigler, RN  Outcome: Ongoing     Problem: MECHANICAL VENTILATION  Goal: Patient will maintain patent airway  4/9/2021 0920 by Will Dugan RCP  Outcome: Ongoing  4/9/2021 0307 by Fletcher Crigler, RN  Outcome: Ongoing     Problem: MECHANICAL VENTILATION  Goal: Oral health is maintained or improved  4/9/2021 0920 by Will Dugan RCP  Outcome: Ongoing  4/9/2021 0307 by Fletcher Crigler, RN  Outcome: Met This Shift     Problem: MECHANICAL VENTILATION  Goal: ET tube will be managed safely  4/9/2021 0920 by Will Dugan RCP  Outcome: Ongoing  4/9/2021 0307 by Fletcher Crigler, RN  Outcome: Met This Shift     Problem: MECHANICAL VENTILATION  Goal: Ability to express needs and understand communication  4/9/2021 0920 by Will Dugan RCP  Outcome: Ongoing  4/9/2021 0307 by Fletcher Crigler, RN  Outcome: Ongoing     Problem: SKIN INTEGRITY  Goal: Skin integrity is maintained or improved  4/9/2021 0920 by Will Dugan RCP  Outcome: Ongoing  4/9/2021 0307 by Fletcher Crigler, RN  Outcome: Met This Shift

## 2021-04-09 NOTE — PROGRESS NOTES
Physical Therapy    DATE: 2021    NAME: Sharon Fernández  MRN: [id number]   : 1950      Patient not seen this date for Physical Therapy due to:    Surgery/Procedure: pt to have R femur fixation today; currently with traction to the RLE; check 4/10/21      Electronically signed by Jo-Ann Macario PT on 2021 at 8:42 AM
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
## 2021-04-09 NOTE — BRIEF OP NOTE
Brief Postoperative Note      Patient: Epi Feldman  YOB: 1950  MRN: [id number]    Date of Procedure: 4/9/2021    Pre-Op Diagnosis: RIGHT FEMUR FRACTURE    Post-Op Diagnosis: Same       Procedure(s):  INSERTION RETROGRADE NAIL FEMUR, REMOVAL OF SKELETAL TRACTION , SYNTHES,, C-ARM    Surgeon(s):  Annette Curtis DO    Assistant:  Resident: Mikael Barraza DO; Yenni Hi DO    Anesthesia: General    Estimated Blood Loss (mL): 100 mL     Fluids: 300 ml Crystalloids    Complications: None    Specimens:   * No specimens in log *    Implants:  Implant Name Type Inv. Item Serial No.  Lot No. LRB No. Used Action   NAIL IM L400MM [id number] UNIV FEM GRN TI JAIME REMY  NAIL IM L400MM [id number] UNIV FEM GRN TI JAIME REMY  DEPUY SYNTHES [place] [id number] Right 1 Implanted   SCREW BNE L34MM DIA5MM NONSTERILE TIB LT GRN TI ST JAIME REMY  SCREW BNE L34MM DIA5MM NONSTERILE TIB LT GRN TI ST JAIME REMY  DEPUY SYNTHES [employer]  Right 1 Implanted   SCREW BNE L85MM DIA5MM TIB LT GRN TI ST JAIME REMY FULL THRD  SCREW BNE L85MM DIA5MM TIB LT GRN TI ST JAIME REMY FULL THRD  DEPUY SYNTHES [employer]  Right 1 Implanted   SCREW BNE L52MM DIA5MM NONSTERILE TIB LT GRN TI ST JAIME REMY  SCREW BNE L52MM DIA5MM NONSTERILE TIB LT GRN TI ST JAIME REMY  DEPUY SYNTHES [employer]  Right 1 Implanted         Drains:   ICP monitor (Active)   Status To Pressure Monitoring 04/09/21 1200   Dressing Status Dry; Intact 04/09/21 1200   Dressing Type Other (Comment) 04/09/21 1200   Site Assessment Other (Comment) 04/09/21 1200   Drainage No Drainage 04/09/21 1200       NG/OG/NJ/NE Tube Orogastric Center mouth (Active)   Surrounding Skin Dry; Intact 04/09/21 1200   Securement device Yes 04/09/21 1200   Status Clamped 04/09/21 1200   Placement Verified by External Catheter Length;by Gastric Contents 04/09/21 1200   NG/OG/NJ/NE External Measurement (cm) 63 cm 04/09/21 1200   Drainage Appearance Bile; Tan 04/07/21 1600   Tube Feeding Immune Enhancing 04/08/21 2000 Tube Feeding Status Stopped 04/09/21 1200   Rate/Schedule 25 mL/hr 04/08/21 2000   Tube Feeding Intake (mL) 92 ml 04/08/21 2000   Free Water Flush (mL) 60 mL 04/08/21 2000   Output (mL) 0 ml 04/07/21 1200       Urethral Catheter (Active)   $ Urethral catheter insertion $ Not inserted for procedure 04/07/21 0400   Catheter Indications Need for fluid management in critically ill patients in a critical care setting not able to be managed by other means such as BSC with hat, bedpan, urinal, condom catheter, or short term intermittent urethral catherization 04/09/21 1200   Securement Device Date Changed 04/08/21 04/09/21 1200   Site Assessment No urethral drainage 04/09/21 1200   Urine Color Yellow 04/09/21 1200   Urine Appearance Clear 04/09/21 1200   Urine Odor Malodorous 04/09/21 1200   Output (mL) 75 mL 04/09/21 1500       Findings: see op note    Electronically signed by Alvina Taylor DO on 4/9/2021 at 5:02 PM

## 2021-04-09 NOTE — PROGRESS NOTES
Updated patient son over phone all questions answered and updated on plan of care    . Electronically signed by Mohinder Hatch RN on 4/9/2021 at 6:31 AM
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
## 2021-04-10 ENCOUNTER — APPOINTMENT (OUTPATIENT)
Dept: GENERAL RADIOLOGY | Age: 71
DRG: 003 | End: 2021-04-10
Payer: MEDICARE

## 2021-04-10 LAB
ABSOLUTE EOS #: 0.02 K/UL (ref 0–0.4)
ABSOLUTE IMMATURE GRANULOCYTE: 0.05 K/UL (ref 0–0.3)
ABSOLUTE LYMPH #: 0.29 K/UL (ref 1–4.8)
ABSOLUTE MONO #: 0.31 K/UL (ref 0.1–0.8)
ALLEN TEST: ABNORMAL
ANION GAP SERPL CALCULATED.3IONS-SCNC: 7 MMOL/L (ref 9–17)
BASOPHILS # BLD: 0 % (ref 0–2)
BASOPHILS ABSOLUTE: 0 K/UL (ref 0–0.2)
BLD PROD TYP BPU: NORMAL
BUN BLDV-MCNC: 21 MG/DL (ref 8–23)
BUN/CREAT BLD: ABNORMAL (ref 9–20)
CALCIUM SERPL-MCNC: 7.5 MG/DL (ref 8.6–10.4)
CHLORIDE BLD-SCNC: 116 MMOL/L (ref 98–107)
CO2: 23 MMOL/L (ref 20–31)
CREAT SERPL-MCNC: 0.63 MG/DL (ref 0.7–1.2)
DIFFERENTIAL TYPE: ABNORMAL
DISPENSE STATUS BLOOD BANK: NORMAL
EOSINOPHILS RELATIVE PERCENT: 1 % (ref 1–4)
FIO2: 40
GFR AFRICAN AMERICAN: >60 ML/MIN
GFR NON-AFRICAN AMERICAN: >60 ML/MIN
GFR SERPL CREATININE-BSD FRML MDRD: ABNORMAL ML/MIN/{1.73_M2}
GFR SERPL CREATININE-BSD FRML MDRD: ABNORMAL ML/MIN/{1.73_M2}
GLUCOSE BLD-MCNC: 155 MG/DL (ref 75–110)
GLUCOSE BLD-MCNC: 155 MG/DL (ref 75–110)
GLUCOSE BLD-MCNC: 162 MG/DL (ref 75–110)
GLUCOSE BLD-MCNC: 165 MG/DL (ref 75–110)
GLUCOSE BLD-MCNC: 168 MG/DL (ref 75–110)
GLUCOSE BLD-MCNC: 170 MG/DL (ref 75–110)
GLUCOSE BLD-MCNC: 184 MG/DL (ref 74–100)
GLUCOSE BLD-MCNC: 190 MG/DL (ref 70–99)
HCT VFR BLD CALC: 21.9 % (ref 40.7–50.3)
HEMOGLOBIN: 7 G/DL (ref 13–17)
IMMATURE GRANULOCYTES: 3 %
LYMPHOCYTES # BLD: 16 % (ref 24–44)
MAGNESIUM: 2.1 MG/DL (ref 1.6–2.6)
MCH RBC QN AUTO: 32.1 PG (ref 25.2–33.5)
MCHC RBC AUTO-ENTMCNC: 32 G/DL (ref 28.4–34.8)
MCV RBC AUTO: 100.5 FL (ref 82.6–102.9)
MODE: ABNORMAL
MONOCYTES # BLD: 17 % (ref 1–7)
MORPHOLOGY: ABNORMAL
MORPHOLOGY: ABNORMAL
NEGATIVE BASE EXCESS, ART: ABNORMAL (ref 0–2)
NRBC AUTOMATED: 3.8 PER 100 WBC
NUCLEATED RED BLOOD CELLS: 1 PER 100 WBC
O2 DEVICE/FLOW/%: ABNORMAL
PATIENT TEMP: 37.7
PDW BLD-RTO: 15.6 % (ref 11.8–14.4)
PHOSPHORUS: 2.6 MG/DL (ref 2.5–4.5)
PLATELET # BLD: 64 K/UL (ref 138–453)
PLATELET ESTIMATE: ABNORMAL
PMV BLD AUTO: 10.1 FL (ref 8.1–13.5)
POC HCO3: 25.4 MMOL/L (ref 21–28)
POC LACTIC ACID: 1.17 MMOL/L (ref 0.56–1.39)
POC O2 SATURATION: 96 % (ref 94–98)
POC PCO2 TEMP: 42 MM HG
POC PCO2: 40.3 MM HG (ref 35–48)
POC PH TEMP: 7.4
POC PH: 7.41 (ref 7.35–7.45)
POC PO2 TEMP: 82 MM HG
POC PO2: 78.5 MM HG (ref 83–108)
POSITIVE BASE EXCESS, ART: 1 (ref 0–3)
POTASSIUM SERPL-SCNC: 3.8 MMOL/L (ref 3.7–5.3)
RBC # BLD: 2.18 M/UL (ref 4.21–5.77)
RBC # BLD: ABNORMAL 10*6/UL
SAMPLE SITE: ABNORMAL
SEG NEUTROPHILS: 63 % (ref 36–66)
SEGMENTED NEUTROPHILS ABSOLUTE COUNT: 1.13 K/UL (ref 1.8–7.7)
SODIUM BLD-SCNC: 146 MMOL/L (ref 135–144)
TCO2 (CALC), ART: 27 MMOL/L (ref 22–29)
TRANSFUSION STATUS: NORMAL
TRIGL SERPL-MCNC: 128 MG/DL
UNIT DIVISION: 0
UNIT NUMBER: NORMAL
WBC # BLD: 1.8 K/UL (ref 3.5–11.3)
WBC # BLD: ABNORMAL 10*3/UL

## 2021-04-10 PROCEDURE — 6360000002 HC RX W HCPCS: Performed by: ORTHOPAEDIC SURGERY

## 2021-04-10 PROCEDURE — 6370000000 HC RX 637 (ALT 250 FOR IP): Performed by: ORTHOPAEDIC SURGERY

## 2021-04-10 PROCEDURE — 80048 BASIC METABOLIC PNL TOTAL CA: CPT

## 2021-04-10 PROCEDURE — 71045 X-RAY EXAM CHEST 1 VIEW: CPT

## 2021-04-10 PROCEDURE — 94761 N-INVAS EAR/PLS OXIMETRY MLT: CPT

## 2021-04-10 PROCEDURE — 82803 BLOOD GASES ANY COMBINATION: CPT

## 2021-04-10 PROCEDURE — 94640 AIRWAY INHALATION TREATMENT: CPT

## 2021-04-10 PROCEDURE — 84478 ASSAY OF TRIGLYCERIDES: CPT

## 2021-04-10 PROCEDURE — 2000000000 HC ICU R&B

## 2021-04-10 PROCEDURE — 82947 ASSAY GLUCOSE BLOOD QUANT: CPT

## 2021-04-10 PROCEDURE — 85025 COMPLETE CBC W/AUTO DIFF WBC: CPT

## 2021-04-10 PROCEDURE — 6360000002 HC RX W HCPCS: Performed by: STUDENT IN AN ORGANIZED HEALTH CARE EDUCATION/TRAINING PROGRAM

## 2021-04-10 PROCEDURE — 6370000000 HC RX 637 (ALT 250 FOR IP): Performed by: STUDENT IN AN ORGANIZED HEALTH CARE EDUCATION/TRAINING PROGRAM

## 2021-04-10 PROCEDURE — 2500000003 HC RX 250 WO HCPCS: Performed by: ORTHOPAEDIC SURGERY

## 2021-04-10 PROCEDURE — 2700000000 HC OXYGEN THERAPY PER DAY

## 2021-04-10 PROCEDURE — 2580000003 HC RX 258: Performed by: ORTHOPAEDIC SURGERY

## 2021-04-10 PROCEDURE — APPNB30 APP NON BILLABLE TIME 0-30 MINS: Performed by: PHYSICIAN ASSISTANT

## 2021-04-10 PROCEDURE — 83605 ASSAY OF LACTIC ACID: CPT

## 2021-04-10 PROCEDURE — 94003 VENT MGMT INPAT SUBQ DAY: CPT

## 2021-04-10 PROCEDURE — 83735 ASSAY OF MAGNESIUM: CPT

## 2021-04-10 PROCEDURE — 84100 ASSAY OF PHOSPHORUS: CPT

## 2021-04-10 PROCEDURE — 37799 UNLISTED PX VASCULAR SURGERY: CPT

## 2021-04-10 RX ORDER — PROPRANOLOL HYDROCHLORIDE 10 MG/1
20 TABLET ORAL 3 TIMES DAILY
Status: DISCONTINUED | OUTPATIENT
Start: 2021-04-10 | End: 2021-04-12

## 2021-04-10 RX ADMIN — IPRATROPIUM BROMIDE AND ALBUTEROL SULFATE 1 AMPULE: .5; 3 SOLUTION RESPIRATORY (INHALATION) at 16:06

## 2021-04-10 RX ADMIN — LACTULOSE 20 G: 10 SOLUTION ORAL at 20:08

## 2021-04-10 RX ADMIN — SPIRONOLACTONE 50 MG: 25 TABLET ORAL at 08:42

## 2021-04-10 RX ADMIN — INSULIN LISPRO 3 UNITS: 100 INJECTION, SOLUTION INTRAVENOUS; SUBCUTANEOUS at 00:33

## 2021-04-10 RX ADMIN — SODIUM CHLORIDE, PRESERVATIVE FREE 10 ML: 5 INJECTION INTRAVENOUS at 20:09

## 2021-04-10 RX ADMIN — ACETAMINOPHEN 1000 MG: 650 SOLUTION ORAL at 16:29

## 2021-04-10 RX ADMIN — PROPOFOL 20 MCG/KG/MIN: 10 INJECTION, EMULSION INTRAVENOUS at 00:31

## 2021-04-10 RX ADMIN — BACITRACIN: 500 OINTMENT TOPICAL at 08:42

## 2021-04-10 RX ADMIN — IPRATROPIUM BROMIDE AND ALBUTEROL SULFATE 1 AMPULE: .5; 3 SOLUTION RESPIRATORY (INHALATION) at 19:50

## 2021-04-10 RX ADMIN — IPRATROPIUM BROMIDE AND ALBUTEROL SULFATE 1 AMPULE: .5; 3 SOLUTION RESPIRATORY (INHALATION) at 07:48

## 2021-04-10 RX ADMIN — INSULIN LISPRO 3 UNITS: 100 INJECTION, SOLUTION INTRAVENOUS; SUBCUTANEOUS at 04:29

## 2021-04-10 RX ADMIN — PROPRANOLOL HYDROCHLORIDE 20 MG: 10 TABLET ORAL at 20:08

## 2021-04-10 RX ADMIN — INSULIN LISPRO 3 UNITS: 100 INJECTION, SOLUTION INTRAVENOUS; SUBCUTANEOUS at 19:49

## 2021-04-10 RX ADMIN — FUROSEMIDE 20 MG: 20 TABLET ORAL at 08:42

## 2021-04-10 RX ADMIN — LEVETIRACETAM 500 MG: 100 SOLUTION ORAL at 08:42

## 2021-04-10 RX ADMIN — IPRATROPIUM BROMIDE AND ALBUTEROL SULFATE 1 AMPULE: .5; 3 SOLUTION RESPIRATORY (INHALATION) at 03:11

## 2021-04-10 RX ADMIN — PROPOFOL 10 MCG/KG/MIN: 10 INJECTION, EMULSION INTRAVENOUS at 17:51

## 2021-04-10 RX ADMIN — ENOXAPARIN SODIUM 30 MG: 30 INJECTION SUBCUTANEOUS at 12:51

## 2021-04-10 RX ADMIN — CHLORHEXIDINE GLUCONATE 0.12% ORAL RINSE 15 ML: 1.2 LIQUID ORAL at 20:08

## 2021-04-10 RX ADMIN — SODIUM CHLORIDE: 9 INJECTION, SOLUTION INTRAVENOUS at 04:01

## 2021-04-10 RX ADMIN — LEVETIRACETAM 500 MG: 100 SOLUTION ORAL at 20:19

## 2021-04-10 RX ADMIN — INSULIN LISPRO 3 UNITS: 100 INJECTION, SOLUTION INTRAVENOUS; SUBCUTANEOUS at 12:48

## 2021-04-10 RX ADMIN — IPRATROPIUM BROMIDE AND ALBUTEROL SULFATE 1 AMPULE: .5; 3 SOLUTION RESPIRATORY (INHALATION) at 11:32

## 2021-04-10 RX ADMIN — SODIUM CHLORIDE, PRESERVATIVE FREE 10 ML: 5 INJECTION INTRAVENOUS at 08:54

## 2021-04-10 RX ADMIN — INSULIN LISPRO 3 UNITS: 100 INJECTION, SOLUTION INTRAVENOUS; SUBCUTANEOUS at 16:28

## 2021-04-10 RX ADMIN — ACETAMINOPHEN 1000 MG: 650 SOLUTION ORAL at 08:48

## 2021-04-10 RX ADMIN — INSULIN LISPRO 3 UNITS: 100 INJECTION, SOLUTION INTRAVENOUS; SUBCUTANEOUS at 08:58

## 2021-04-10 RX ADMIN — BACITRACIN: 500 OINTMENT TOPICAL at 20:08

## 2021-04-10 RX ADMIN — ACETAMINOPHEN 1000 MG: 650 SOLUTION ORAL at 00:17

## 2021-04-10 RX ADMIN — FUROSEMIDE 20 MG: 20 TABLET ORAL at 20:19

## 2021-04-10 RX ADMIN — POTASSIUM BICARBONATE 20 MEQ: 782 TABLET, EFFERVESCENT ORAL at 08:48

## 2021-04-10 RX ADMIN — FAMOTIDINE 20 MG: 10 INJECTION INTRAVENOUS at 08:42

## 2021-04-10 RX ADMIN — ENOXAPARIN SODIUM 30 MG: 30 INJECTION SUBCUTANEOUS at 20:08

## 2021-04-10 RX ADMIN — DEXTROSE MONOHYDRATE 2000 MG: 50 INJECTION, SOLUTION INTRAVENOUS at 06:19

## 2021-04-10 RX ADMIN — IPRATROPIUM BROMIDE AND ALBUTEROL SULFATE 1 AMPULE: .5; 3 SOLUTION RESPIRATORY (INHALATION) at 23:26

## 2021-04-10 RX ADMIN — LACTULOSE 20 G: 10 SOLUTION ORAL at 15:21

## 2021-04-10 ASSESSMENT — PULMONARY FUNCTION TESTS
PIF_VALUE: 26
PIF_VALUE: 30
PIF_VALUE: 28
PIF_VALUE: 29
PIF_VALUE: 30
PIF_VALUE: 39
PIF_VALUE: 38
PIF_VALUE: 28
PIF_VALUE: 29
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
## 2021-04-10 NOTE — PLAN OF CARE
Problem: OXYGENATION/RESPIRATORY FUNCTION  Goal: Patient will maintain patent airway  4/10/2021 0729 by Matthew Roberts RCP  Outcome: Ongoing  4/10/2021 0600 by Milan Ugalde RN  Outcome: Ongoing  4/10/2021 0117 by Casey Esposito RCP  Outcome: Ongoing  Goal: Patient will achieve/maintain normal respiratory rate/effort  Description: Respiratory rate and effort will be within normal limits for the patient  4/10/2021 0729 by Matthew Roberts RCP  Outcome: Ongoing  4/10/2021 0600 by Milan Ugalde RN  Outcome: Ongoing  4/10/2021 0117 by Casey Esposito RCP  Outcome: Ongoing     Problem: MECHANICAL VENTILATION  Goal: Patient will maintain patent airway  4/10/2021 0729 by Matthew Roberts RCP  Outcome: Ongoing  4/10/2021 0117 by Casey Esposito RCP  Outcome: Ongoing  Goal: Oral health is maintained or improved  4/10/2021 0729 by Matthew Roberts RCP  Outcome: Ongoing  4/10/2021 0600 by Mlian Ugalde RN  Outcome: Met This Shift  4/10/2021 0117 by Casey Esposito RCP  Outcome: Ongoing  Goal: ET tube will be managed safely  4/10/2021 0729 by Matthew Roberts RCP  Outcome: Ongoing  4/10/2021 0600 by Milan Ugalde RN  Outcome: Met This Shift  4/10/2021 0117 by Casey Esposito RCP  Outcome: Ongoing  Goal: Ability to express needs and understand communication  4/10/2021 0729 by Matthew Roberts RCP  Outcome: Ongoing  4/10/2021 0600 by Milan Ugalde RN  Outcome: Ongoing  4/10/2021 0117 by Casey Esposito RCP  Outcome: Ongoing  Goal: Mobility/activity is maintained at optimum level for patient  4/10/2021 0729 by Matthew Roberts RCP  Outcome: Ongoing  4/10/2021 0600 by Milan Ugalde RN  Outcome: Ongoing  4/10/2021 0117 by Casey Esposito RCP  Outcome: Ongoing     Problem: SKIN INTEGRITY  Goal: Skin integrity is maintained or improved  4/10/2021 0729 by Matthew Roberts RCP  Outcome: Ongoing  4/10/2021 0600 by Milan Ugalde RN  Outcome: Met This Shift  4/10/2021 0117 by Casey Esposito RCP  Outcome: Ongoing Home

## 2021-04-10 NOTE — PLAN OF CARE
Problem: OXYGENATION/RESPIRATORY FUNCTION  Goal: Patient will maintain patent airway  4/10/2021 1542 by Suman Castellon RN  Outcome: Ongoing  4/10/2021 0729 by Gris Washington RCP  Outcome: Ongoing  4/10/2021 0600 by Marsha Schneider RN  Outcome: Ongoing  Goal: Patient will achieve/maintain normal respiratory rate/effort  Description: Respiratory rate and effort will be within normal limits for the patient  4/10/2021 1542 by Suman Castellon RN  Outcome: Ongoing  4/10/2021 0729 by Gris Washington RCP  Outcome: Ongoing  4/10/2021 0600 by Marsha Schneider RN  Outcome: Ongoing     Problem: MECHANICAL VENTILATION  Goal: Patient will maintain patent airway  4/10/2021 1542 by Suman Castellon RN  Outcome: Ongoing  4/10/2021 0729 by Gris Washington RCP  Outcome: Ongoing  Goal: Oral health is maintained or improved  4/10/2021 1542 by Suman Castellon RN  Outcome: Ongoing  4/10/2021 0729 by Gris Washington RCP  Outcome: Ongoing  4/10/2021 0600 by Marsha Schneider RN  Outcome: Met This Shift  Goal: ET tube will be managed safely  4/10/2021 1542 by Suman Castellon RN  Outcome: Ongoing  4/10/2021 0729 by Gris Washington RCP  Outcome: Ongoing  4/10/2021 0600 by Marsha Schneider RN  Outcome: Met This Shift  Goal: Ability to express needs and understand communication  4/10/2021 1542 by Suman Castellon RN  Outcome: Ongoing  4/10/2021 0729 by Gris Washington RCP  Outcome: Ongoing  4/10/2021 0600 by Marsha Schneider RN  Outcome: Ongoing  Goal: Mobility/activity is maintained at optimum level for patient  4/10/2021 1542 by Suman Castellon RN  Outcome: Ongoing  4/10/2021 0729 by Gris Washington RCP  Outcome: Ongoing  4/10/2021 0600 by Marsha Schneider RN  Outcome: Ongoing     Problem: SKIN INTEGRITY  Goal: Skin integrity is maintained or improved  4/10/2021 1542 by Suman Castellon RN  Outcome: Ongoing  4/10/2021 0729 by Gris Washington RCP  Outcome: Ongoing  4/10/2021 0600 by Marsha Schneider RN  Outcome: Met This Shift     Problem: Falls - Risk of:  Goal: Will remain free from falls  Description: Will remain free from falls  4/10/2021 1542 by Jeni Mercer RN  Outcome: Ongoing  4/10/2021 0600 by Sky Farmer RN  Outcome: Met This Shift  Goal: Absence of physical injury  Description: Absence of physical injury  4/10/2021 1542 by Jeni Mercer RN  Outcome: Ongoing  4/10/2021 0600 by Sky Farmer RN  Outcome: Met This Shift     Problem: Confusion - Acute:  Goal: Absence of continued neurological deterioration signs and symptoms  Description: Absence of continued neurological deterioration signs and symptoms  4/10/2021 1542 by Jeni Mercer RN  Outcome: Ongoing  4/10/2021 0600 by Sky Farmer RN  Outcome: Ongoing  Goal: Mental status will be restored to baseline  Description: Mental status will be restored to baseline  4/10/2021 1542 by Jeni Mercer RN  Outcome: Ongoing  4/10/2021 0600 by Sky Farmer RN  Outcome: Ongoing     Problem: Discharge Planning:  Goal: Ability to perform activities of daily living will improve  Description: Ability to perform activities of daily living will improve  4/10/2021 1542 by Jeni Mercer RN  Outcome: Ongoing  4/10/2021 0600 by Sky Farmer RN  Outcome: Ongoing  Goal: Participates in care planning  Description: Participates in care planning  4/10/2021 1542 by Jeni Mercer RN  Outcome: Ongoing  4/10/2021 0600 by Sky Farmer RN  Outcome: Ongoing  Goal: Discharged to appropriate level of care  Description: Discharged to appropriate level of care  4/10/2021 1542 by Jeni Mercer RN  Outcome: Ongoing  4/10/2021 0600 by Sky Farmer RN  Outcome: Ongoing     Problem: Injury - Risk of, Physical Injury:  Goal: Will remain free from falls  Description: Will remain free from falls  4/10/2021 1542 by Jeni Mercer RN  Outcome: Ongoing  4/10/2021 0600 by Sky Farmer RN  Outcome: Met This Shift  Goal: Absence of physical injury  Description: Absence of physical injury  4/10/2021 1542 by Jeni Mercer RN  Outcome: Ongoing  4/10/2021 0600 by Aida Negron RN  Outcome: Met This Shift     Problem: Mood - Altered:  Goal: Mood stable  Description: Mood stable  4/10/2021 1542 by Huyen Kamara RN  Outcome: Ongoing  4/10/2021 0600 by Aida Negron RN  Outcome: Ongoing  Goal: Absence of abusive behavior  Description: Absence of abusive behavior  4/10/2021 1542 by Huyen Kamara RN  Outcome: Ongoing  4/10/2021 0600 by Aida Negron RN  Outcome: Ongoing  Goal: Verbalizations of feeling emotionally comfortable while being cared for will increase  Description: Verbalizations of feeling emotionally comfortable while being cared for will increase  4/10/2021 1542 by Huyen Kamara RN  Outcome: Ongoing  4/10/2021 0600 by Aida Negron RN  Outcome: Ongoing     Problem: Psychomotor Activity - Altered:  Goal: Absence of psychomotor disturbance signs and symptoms  Description: Absence of psychomotor disturbance signs and symptoms  4/10/2021 1542 by Huyen Kamara RN  Outcome: Ongoing  4/10/2021 0600 by Aida Negron RN  Outcome: Ongoing     Problem: Sensory Perception - Impaired:  Goal: Demonstrations of improved sensory functioning will increase  Description: Demonstrations of improved sensory functioning will increase  4/10/2021 1542 by Huyen Kamara RN  Outcome: Ongoing  4/10/2021 0600 by Aida Negron RN  Outcome: Ongoing  Goal: Decrease in sensory misperception frequency  Description: Decrease in sensory misperception frequency  4/10/2021 1542 by Huyen Kamara RN  Outcome: Ongoing  4/10/2021 0600 by Aida Negron RN  Outcome: Ongoing  Goal: Able to refrain from responding to false sensory perceptions  Description: Able to refrain from responding to false sensory perceptions  4/10/2021 1542 by Huyen Kamara RN  Outcome: Ongoing  4/10/2021 0600 by Aida Negron RN  Outcome: Ongoing  Goal: Demonstrates accurate environmental perceptions  Description: Demonstrates accurate environmental perceptions  4/10/2021 1542 by Huyen Kamara RN  Outcome: RN  Outcome: Ongoing  4/10/2021 0600 by Jorge Saeed RN  Outcome: Ongoing     Problem: Pain - Acute:  Goal: Pain level will decrease  Description: Pain level will decrease  4/10/2021 1542 by Esme Glover RN  Outcome: Ongoing  4/10/2021 0600 by Jorge Saeed RN  Outcome: Ongoing     Problem: Venous Thromboembolism:  Goal: Absence of deep vein thrombosis  Description: Absence of deep vein thrombosis  Outcome: Ongoing  Goal: Absence of signs or symptoms of impaired coagulation  Description: Absence of signs or symptoms of impaired coagulation  Outcome: Ongoing  Goal: Will show no signs or symptoms of venous thromboembolism  Description: Will show no signs or symptoms of venous thromboembolism  Outcome: Ongoing     Problem: Nutrition  Goal: Optimal nutrition therapy  Description: Nutrition Problem #1: Inadequate oral intake  Intervention: Food and/or Nutrient Delivery: (Start nutrition as able.  If TF, suggest Immune Enhancing formula with goal rate of 60 mL/hr to provide 2160 kcal and 135 g pro/day.)  Nutritional Goals: meet % of estimated nutrient needs     Outcome: Ongoing

## 2021-04-10 NOTE — PROGRESS NOTES
Writer updated and answered questions at bedside     . Electronically signed by Carmen Pantoja RN on 4/9/2021 at 8:44 PM

## 2021-04-10 NOTE — PROGRESS NOTES
04/10/21 0849   ETT (adult)   Placement Date/Time: 04/06/21 1929   Timeout: Patient;Procedure;Site/Side;Appropriate Equipment;Correct Position  Preoxygenation: Yes  Mask Ventilation: Ventilated by mask (1)  Technique: Video laryngoscopy  Type: Cuffed  Tube Size: 8 mm  Laryngoscope. ..    Secured at 25 cm  (pulled back per trauma)   Measured From Lips   ET Placement Left   Secured By Twill tape   Site Condition Dry

## 2021-04-10 NOTE — ANESTHESIA POSTPROCEDURE EVALUATION
Department of Anesthesiology  Postprocedure Note    Patient: Amanda Arroyo  MRN: [id number]  YOB: 1950  Date of evaluation: 4/9/2021  Time:  8:18 PM     Procedure Summary     Date: 04/09/21 Room / Location: [address]    Anesthesia Start: 3828 Anesthesia Stop: 5773    Procedure: INSERTION RETROGRADE NAIL FEMUR, REMOVAL OF SKELETAL TRACTION , SYNTHES,, C-ARM (Right ) Diagnosis: (RIGHT FEMUR FRACTURE)    Surgeons: Rocco Carballo DO Responsible Provider: Diane Reis MD    Anesthesia Type: general ASA Status: 3          Anesthesia Type: general    René Phase I:      René Phase II:      Last vitals: Reviewed and per EMR flowsheets.        Anesthesia Post Evaluation    Patient location during evaluation: PACU  Level of consciousness: awake and awake and alert  Airway patency: patent  Nausea & Vomiting: no nausea and no vomiting  Complications: no  Cardiovascular status: blood pressure returned to baseline  Respiratory status: acceptable  Hydration status: euvolemic
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
## 2021-04-10 NOTE — PROGRESS NOTES
2000     Sedation holiday deferred due to increased ICP, hypertension. Patient responding to painful stimuli in BLE and left upper extremity     . Electronically signed by Mikaela Gonsalez RN on 4/9/2021 at 8:58 PM

## 2021-04-11 ENCOUNTER — APPOINTMENT (OUTPATIENT)
Dept: GENERAL RADIOLOGY | Age: 71
DRG: 003 | End: 2021-04-11
Payer: MEDICARE

## 2021-04-11 LAB
ABSOLUTE EOS #: 0.05 K/UL (ref 0–0.4)
ABSOLUTE IMMATURE GRANULOCYTE: 0 K/UL (ref 0–0.3)
ABSOLUTE LYMPH #: 0.39 K/UL (ref 1–4.8)
ABSOLUTE MONO #: 0.25 K/UL (ref 0.1–0.8)
ALBUMIN SERPL-MCNC: 2.3 G/DL (ref 3.5–5.2)
ALBUMIN/GLOBULIN RATIO: 1 (ref 1–2.5)
ALLEN TEST: NORMAL
ALP BLD-CCNC: 57 U/L (ref 40–129)
ALT SERPL-CCNC: 28 U/L (ref 5–41)
ANION GAP SERPL CALCULATED.3IONS-SCNC: 5 MMOL/L (ref 9–17)
AST SERPL-CCNC: 87 U/L
BASOPHILS # BLD: 0 % (ref 0–2)
BASOPHILS ABSOLUTE: 0 K/UL (ref 0–0.2)
BILIRUB SERPL-MCNC: 1.14 MG/DL (ref 0.3–1.2)
BILIRUBIN DIRECT: 0.47 MG/DL
BILIRUBIN, INDIRECT: 0.67 MG/DL (ref 0–1)
BNP INTERPRETATION: ABNORMAL
BUN BLDV-MCNC: 21 MG/DL (ref 8–23)
BUN/CREAT BLD: ABNORMAL (ref 9–20)
CALCIUM SERPL-MCNC: 7.5 MG/DL (ref 8.6–10.4)
CHLORIDE BLD-SCNC: 119 MMOL/L (ref 98–107)
CO2: 24 MMOL/L (ref 20–31)
CREAT SERPL-MCNC: 0.63 MG/DL (ref 0.7–1.2)
DIFFERENTIAL TYPE: ABNORMAL
EOSINOPHILS RELATIVE PERCENT: 2 % (ref 1–4)
FIO2: 40
GFR AFRICAN AMERICAN: >60 ML/MIN
GFR NON-AFRICAN AMERICAN: >60 ML/MIN
GFR SERPL CREATININE-BSD FRML MDRD: ABNORMAL ML/MIN/{1.73_M2}
GFR SERPL CREATININE-BSD FRML MDRD: ABNORMAL ML/MIN/{1.73_M2}
GLOBULIN: ABNORMAL G/DL (ref 1.5–3.8)
GLUCOSE BLD-MCNC: 160 MG/DL (ref 75–110)
GLUCOSE BLD-MCNC: 162 MG/DL (ref 75–110)
GLUCOSE BLD-MCNC: 177 MG/DL (ref 75–110)
GLUCOSE BLD-MCNC: 186 MG/DL (ref 74–100)
GLUCOSE BLD-MCNC: 191 MG/DL (ref 75–110)
GLUCOSE BLD-MCNC: 193 MG/DL (ref 70–99)
GLUCOSE BLD-MCNC: 203 MG/DL (ref 75–110)
GLUCOSE BLD-MCNC: 210 MG/DL (ref 75–110)
HCT VFR BLD CALC: 21.1 % (ref 40.7–50.3)
HCT VFR BLD CALC: 26 % (ref 40.7–50.3)
HEMOGLOBIN: 6.6 G/DL (ref 13–17)
HEMOGLOBIN: 8.4 G/DL (ref 13–17)
IMMATURE GRANULOCYTES: 0 %
LYMPHOCYTES # BLD: 17 % (ref 24–44)
MAGNESIUM: 2.2 MG/DL (ref 1.6–2.6)
MCH RBC QN AUTO: 32 PG (ref 25.2–33.5)
MCHC RBC AUTO-ENTMCNC: 31.3 G/DL (ref 28.4–34.8)
MCV RBC AUTO: 102.4 FL (ref 82.6–102.9)
MODE: NORMAL
MONOCYTES # BLD: 11 % (ref 1–7)
MORPHOLOGY: ABNORMAL
MORPHOLOGY: ABNORMAL
NEGATIVE BASE EXCESS, ART: NORMAL (ref 0–2)
NRBC AUTOMATED: 3 PER 100 WBC
O2 DEVICE/FLOW/%: NORMAL
PATIENT TEMP: 37.9
PDW BLD-RTO: 15.7 % (ref 11.8–14.4)
PHOSPHORUS: 2.1 MG/DL (ref 2.5–4.5)
PLATELET # BLD: 67 K/UL (ref 138–453)
PLATELET ESTIMATE: ABNORMAL
PMV BLD AUTO: 10.3 FL (ref 8.1–13.5)
POC HCO3: 25.7 MMOL/L (ref 21–28)
POC LACTIC ACID: 0.8 MMOL/L (ref 0.56–1.39)
POC O2 SATURATION: 98 % (ref 94–98)
POC PCO2 TEMP: 41 MM HG
POC PCO2: 39.5 MM HG (ref 35–48)
POC PH TEMP: 7.41
POC PH: 7.42 (ref 7.35–7.45)
POC PO2 TEMP: 102 MM HG
POC PO2: 96.5 MM HG (ref 83–108)
POSITIVE BASE EXCESS, ART: 1 (ref 0–3)
POTASSIUM SERPL-SCNC: 3.6 MMOL/L (ref 3.7–5.3)
PRO-BNP: 603 PG/ML
RBC # BLD: 2.06 M/UL (ref 4.21–5.77)
RBC # BLD: ABNORMAL 10*6/UL
SAMPLE SITE: NORMAL
SEG NEUTROPHILS: 70 % (ref 36–66)
SEGMENTED NEUTROPHILS ABSOLUTE COUNT: 1.61 K/UL (ref 1.8–7.7)
SODIUM BLD-SCNC: 148 MMOL/L (ref 135–144)
TCO2 (CALC), ART: 27 MMOL/L (ref 22–29)
TOTAL PROTEIN: 4.5 G/DL (ref 6.4–8.3)
WBC # BLD: 2.3 K/UL (ref 3.5–11.3)
WBC # BLD: ABNORMAL 10*3/UL

## 2021-04-11 PROCEDURE — 85014 HEMATOCRIT: CPT

## 2021-04-11 PROCEDURE — 86900 BLOOD TYPING SEROLOGIC ABO: CPT

## 2021-04-11 PROCEDURE — 71045 X-RAY EXAM CHEST 1 VIEW: CPT

## 2021-04-11 PROCEDURE — 83605 ASSAY OF LACTIC ACID: CPT

## 2021-04-11 PROCEDURE — 84100 ASSAY OF PHOSPHORUS: CPT

## 2021-04-11 PROCEDURE — 6370000000 HC RX 637 (ALT 250 FOR IP): Performed by: ORTHOPAEDIC SURGERY

## 2021-04-11 PROCEDURE — 2500000003 HC RX 250 WO HCPCS: Performed by: STUDENT IN AN ORGANIZED HEALTH CARE EDUCATION/TRAINING PROGRAM

## 2021-04-11 PROCEDURE — 6370000000 HC RX 637 (ALT 250 FOR IP): Performed by: STUDENT IN AN ORGANIZED HEALTH CARE EDUCATION/TRAINING PROGRAM

## 2021-04-11 PROCEDURE — 6360000002 HC RX W HCPCS: Performed by: ORTHOPAEDIC SURGERY

## 2021-04-11 PROCEDURE — 6360000002 HC RX W HCPCS: Performed by: STUDENT IN AN ORGANIZED HEALTH CARE EDUCATION/TRAINING PROGRAM

## 2021-04-11 PROCEDURE — 85025 COMPLETE CBC W/AUTO DIFF WBC: CPT

## 2021-04-11 PROCEDURE — 82803 BLOOD GASES ANY COMBINATION: CPT

## 2021-04-11 PROCEDURE — 2700000000 HC OXYGEN THERAPY PER DAY

## 2021-04-11 PROCEDURE — 2580000003 HC RX 258: Performed by: STUDENT IN AN ORGANIZED HEALTH CARE EDUCATION/TRAINING PROGRAM

## 2021-04-11 PROCEDURE — 80048 BASIC METABOLIC PNL TOTAL CA: CPT

## 2021-04-11 PROCEDURE — 94640 AIRWAY INHALATION TREATMENT: CPT

## 2021-04-11 PROCEDURE — 85018 HEMOGLOBIN: CPT

## 2021-04-11 PROCEDURE — 80076 HEPATIC FUNCTION PANEL: CPT

## 2021-04-11 PROCEDURE — 82947 ASSAY GLUCOSE BLOOD QUANT: CPT

## 2021-04-11 PROCEDURE — 36430 TRANSFUSION BLD/BLD COMPNT: CPT

## 2021-04-11 PROCEDURE — 83880 ASSAY OF NATRIURETIC PEPTIDE: CPT

## 2021-04-11 PROCEDURE — P9016 RBC LEUKOCYTES REDUCED: HCPCS

## 2021-04-11 PROCEDURE — 2000000000 HC ICU R&B

## 2021-04-11 PROCEDURE — APPNB30 APP NON BILLABLE TIME 0-30 MINS: Performed by: PHYSICIAN ASSISTANT

## 2021-04-11 PROCEDURE — 36415 COLL VENOUS BLD VENIPUNCTURE: CPT

## 2021-04-11 PROCEDURE — 94003 VENT MGMT INPAT SUBQ DAY: CPT

## 2021-04-11 PROCEDURE — 83735 ASSAY OF MAGNESIUM: CPT

## 2021-04-11 PROCEDURE — 37799 UNLISTED PX VASCULAR SURGERY: CPT

## 2021-04-11 PROCEDURE — 94761 N-INVAS EAR/PLS OXIMETRY MLT: CPT

## 2021-04-11 PROCEDURE — 2580000003 HC RX 258: Performed by: ORTHOPAEDIC SURGERY

## 2021-04-11 RX ORDER — KETAMINE HYDROCHLORIDE 10 MG/ML
0.15 INJECTION, SOLUTION INTRAMUSCULAR; INTRAVENOUS ONCE
Status: DISCONTINUED | OUTPATIENT
Start: 2021-04-11 | End: 2021-04-11

## 2021-04-11 RX ORDER — SODIUM CHLORIDE 9 MG/ML
INJECTION, SOLUTION INTRAVENOUS PRN
Status: DISCONTINUED | OUTPATIENT
Start: 2021-04-11 | End: 2021-04-14

## 2021-04-11 RX ORDER — FUROSEMIDE 10 MG/ML
20 INJECTION INTRAMUSCULAR; INTRAVENOUS ONCE
Status: COMPLETED | OUTPATIENT
Start: 2021-04-11 | End: 2021-04-11

## 2021-04-11 RX ADMIN — FUROSEMIDE 20 MG: 10 INJECTION, SOLUTION INTRAMUSCULAR; INTRAVENOUS at 16:36

## 2021-04-11 RX ADMIN — POTASSIUM BICARBONATE 40 MEQ: 782 TABLET, EFFERVESCENT ORAL at 05:27

## 2021-04-11 RX ADMIN — IPRATROPIUM BROMIDE AND ALBUTEROL SULFATE 1 AMPULE: .5; 3 SOLUTION RESPIRATORY (INHALATION) at 03:09

## 2021-04-11 RX ADMIN — ACETAMINOPHEN 1000 MG: 650 SOLUTION ORAL at 08:07

## 2021-04-11 RX ADMIN — ACETAMINOPHEN 1000 MG: 650 SOLUTION ORAL at 18:14

## 2021-04-11 RX ADMIN — IPRATROPIUM BROMIDE AND ALBUTEROL SULFATE 1 AMPULE: .5; 3 SOLUTION RESPIRATORY (INHALATION) at 12:06

## 2021-04-11 RX ADMIN — CHLORHEXIDINE GLUCONATE 0.12% ORAL RINSE 15 ML: 1.2 LIQUID ORAL at 08:16

## 2021-04-11 RX ADMIN — IPRATROPIUM BROMIDE AND ALBUTEROL SULFATE 1 AMPULE: .5; 3 SOLUTION RESPIRATORY (INHALATION) at 15:32

## 2021-04-11 RX ADMIN — FENTANYL CITRATE 50 MCG: 50 INJECTION, SOLUTION INTRAMUSCULAR; INTRAVENOUS at 11:42

## 2021-04-11 RX ADMIN — PROPRANOLOL HYDROCHLORIDE 20 MG: 10 TABLET ORAL at 20:31

## 2021-04-11 RX ADMIN — INSULIN LISPRO 3 UNITS: 100 INJECTION, SOLUTION INTRAVENOUS; SUBCUTANEOUS at 03:57

## 2021-04-11 RX ADMIN — SODIUM CHLORIDE 75 ML/HR: 9 INJECTION, SOLUTION INTRAVENOUS at 03:43

## 2021-04-11 RX ADMIN — SPIRONOLACTONE 50 MG: 25 TABLET ORAL at 08:07

## 2021-04-11 RX ADMIN — LEVETIRACETAM 500 MG: 100 SOLUTION ORAL at 20:31

## 2021-04-11 RX ADMIN — SODIUM CHLORIDE, PRESERVATIVE FREE 10 ML: 5 INJECTION INTRAVENOUS at 20:31

## 2021-04-11 RX ADMIN — IPRATROPIUM BROMIDE AND ALBUTEROL SULFATE 1 AMPULE: .5; 3 SOLUTION RESPIRATORY (INHALATION) at 19:43

## 2021-04-11 RX ADMIN — ENOXAPARIN SODIUM 30 MG: 30 INJECTION SUBCUTANEOUS at 08:06

## 2021-04-11 RX ADMIN — FUROSEMIDE 20 MG: 20 TABLET ORAL at 20:31

## 2021-04-11 RX ADMIN — PROPRANOLOL HYDROCHLORIDE 20 MG: 10 TABLET ORAL at 08:07

## 2021-04-11 RX ADMIN — FENTANYL CITRATE 50 MCG: 50 INJECTION, SOLUTION INTRAMUSCULAR; INTRAVENOUS at 17:48

## 2021-04-11 RX ADMIN — FUROSEMIDE 20 MG: 20 TABLET ORAL at 08:06

## 2021-04-11 RX ADMIN — FENTANYL CITRATE 50 MCG: 50 INJECTION, SOLUTION INTRAMUSCULAR; INTRAVENOUS at 23:52

## 2021-04-11 RX ADMIN — LEVETIRACETAM 500 MG: 100 SOLUTION ORAL at 08:18

## 2021-04-11 RX ADMIN — FENTANYL CITRATE 50 MCG: 50 INJECTION, SOLUTION INTRAMUSCULAR; INTRAVENOUS at 14:16

## 2021-04-11 RX ADMIN — IPRATROPIUM BROMIDE AND ALBUTEROL SULFATE 1 AMPULE: .5; 3 SOLUTION RESPIRATORY (INHALATION) at 08:19

## 2021-04-11 RX ADMIN — PROPRANOLOL HYDROCHLORIDE 20 MG: 10 TABLET ORAL at 14:16

## 2021-04-11 RX ADMIN — ACETAMINOPHEN 1000 MG: 650 SOLUTION ORAL at 00:40

## 2021-04-11 RX ADMIN — BACITRACIN: 500 OINTMENT TOPICAL at 08:08

## 2021-04-11 RX ADMIN — INSULIN LISPRO 6 UNITS: 100 INJECTION, SOLUTION INTRAVENOUS; SUBCUTANEOUS at 16:36

## 2021-04-11 RX ADMIN — PROPOFOL 10 MCG/KG/MIN: 10 INJECTION, EMULSION INTRAVENOUS at 08:00

## 2021-04-11 RX ADMIN — IPRATROPIUM BROMIDE AND ALBUTEROL SULFATE 1 AMPULE: .5; 3 SOLUTION RESPIRATORY (INHALATION) at 23:16

## 2021-04-11 RX ADMIN — FENTANYL CITRATE 50 MCG: 50 INJECTION, SOLUTION INTRAMUSCULAR; INTRAVENOUS at 21:07

## 2021-04-11 RX ADMIN — INSULIN LISPRO 6 UNITS: 100 INJECTION, SOLUTION INTRAVENOUS; SUBCUTANEOUS at 20:16

## 2021-04-11 RX ADMIN — CHLORHEXIDINE GLUCONATE 0.12% ORAL RINSE 15 ML: 1.2 LIQUID ORAL at 20:31

## 2021-04-11 RX ADMIN — BACITRACIN: 500 OINTMENT TOPICAL at 20:31

## 2021-04-11 RX ADMIN — INSULIN LISPRO 3 UNITS: 100 INJECTION, SOLUTION INTRAVENOUS; SUBCUTANEOUS at 12:53

## 2021-04-11 RX ADMIN — INSULIN LISPRO 3 UNITS: 100 INJECTION, SOLUTION INTRAVENOUS; SUBCUTANEOUS at 08:18

## 2021-04-11 RX ADMIN — INSULIN LISPRO 3 UNITS: 100 INJECTION, SOLUTION INTRAVENOUS; SUBCUTANEOUS at 00:41

## 2021-04-11 RX ADMIN — KETAMINE HYDROCHLORIDE 0.2 MG/KG/HR: 50 INJECTION INTRAMUSCULAR; INTRAVENOUS at 13:45

## 2021-04-11 ASSESSMENT — PULMONARY FUNCTION TESTS
PIF_VALUE: 28
PIF_VALUE: 33
PIF_VALUE: 31
PIF_VALUE: 26
PIF_VALUE: 27
PIF_VALUE: 18
PIF_VALUE: 30
PIF_VALUE: 33
PIF_VALUE: 24

## 2021-04-11 ASSESSMENT — PAIN SCALES - GENERAL
PAINLEVEL_OUTOF10: 0
PAINLEVEL_OUTOF10: 0

## 2021-04-11 NOTE — PLAN OF CARE
Problem: OXYGENATION/RESPIRATORY FUNCTION  Goal: Patient will maintain patent airway  4/11/2021 0053 by Deidre Rollins RN  Outcome: Ongoing  4/10/2021 2218 by GLEN HardinP  Outcome: Ongoing  4/10/2021 1542 by Jeni Mercer RN  Outcome: Ongoing  Goal: Patient will achieve/maintain normal respiratory rate/effort  Description: Respiratory rate and effort will be within normal limits for the patient  4/11/2021 0053 by Deidre Rollins RN  Outcome: Ongoing  4/10/2021 2218 by Kiera Gayle RCP  Outcome: Ongoing  4/10/2021 1542 by Jeni Mercer RN  Outcome: Ongoing     Problem: MECHANICAL VENTILATION  Goal: Patient will maintain patent airway  4/11/2021 0053 by Deidre Rollins RN  Outcome: Ongoing  4/10/2021 2218 by GLEN HardinP  Outcome: Ongoing  4/10/2021 1542 by Jeni Mercer RN  Outcome: Ongoing  Goal: Oral health is maintained or improved  4/11/2021 0053 by Deidre Rollins RN  Outcome: Ongoing  4/10/2021 2218 by GLEN HardinP  Outcome: Ongoing  4/10/2021 1542 by Jeni Mercer RN  Outcome: Ongoing  Goal: ET tube will be managed safely  4/11/2021 0053 by Deidre Rollins RN  Outcome: Ongoing  4/10/2021 2218 by GLEN HardinP  Outcome: Ongoing  4/10/2021 1542 by Jeni Mercer RN  Outcome: Ongoing  Goal: Ability to express needs and understand communication  4/11/2021 0053 by Deidre Rollins RN  Outcome: Ongoing  4/10/2021 2218 by GLEN HardinP  Outcome: Ongoing  4/10/2021 1542 by Jeni Mercer RN  Outcome: Ongoing  Goal: Mobility/activity is maintained at optimum level for patient  4/11/2021 0053 by Deidre Rollins RN  Outcome: Ongoing  4/10/2021 2218 by GLEN HardinP  Outcome: Ongoing  4/10/2021 1542 by Jeni Mercer RN  Outcome: Ongoing     Problem: SKIN INTEGRITY  Goal: Skin integrity is maintained or improved  4/11/2021 0053 by Deidre Rollins RN  Outcome: Ongoing  4/10/2021 2218 by Kiera Gayle RCP  Outcome: Ongoing  4/10/2021 1542 by Jeni Mercer RN  Outcome: Ongoing     Problem: Falls - Risk of:  Goal: Will remain free from falls  Description: Will remain free from falls  4/11/2021 0053 by Rambo Schwarz RN  Outcome: Ongoing  4/10/2021 1542 by Tarah Morgan RN  Outcome: Ongoing  Goal: Absence of physical injury  Description: Absence of physical injury  4/11/2021 0053 by Rambo Schwarz RN  Outcome: Ongoing  4/10/2021 1542 by Tarah Morgan RN  Outcome: Ongoing     Problem: Confusion - Acute:  Goal: Absence of continued neurological deterioration signs and symptoms  Description: Absence of continued neurological deterioration signs and symptoms  4/11/2021 0053 by Rambo Schwarz RN  Outcome: Ongoing  4/10/2021 1542 by Tarah Morgan RN  Outcome: Ongoing  Goal: Mental status will be restored to baseline  Description: Mental status will be restored to baseline  4/11/2021 0053 by Rambo Schwarz RN  Outcome: Ongoing  4/10/2021 1542 by Tarah Morgan RN  Outcome: Ongoing     Problem: Discharge Planning:  Goal: Ability to perform activities of daily living will improve  Description: Ability to perform activities of daily living will improve  4/11/2021 0053 by Rambo Schwarz RN  Outcome: Ongoing  4/10/2021 1542 by Tarah Morgan RN  Outcome: Ongoing  Goal: Participates in care planning  Description: Participates in care planning  4/11/2021 0053 by Rambo Schwarz RN  Outcome: Ongoing  4/10/2021 1542 by Tarah Morgan RN  Outcome: Ongoing  Goal: Discharged to appropriate level of care  Description: Discharged to appropriate level of care  4/11/2021 0053 by Rambo Schwarz RN  Outcome: Ongoing  4/10/2021 1542 by Tarah Morgan RN  Outcome: Ongoing     Problem: Injury - Risk of, Physical Injury:  Goal: Will remain free from falls  Description: Will remain free from falls  4/11/2021 0053 by Rambo Schwarz RN  Outcome: Ongoing  4/10/2021 1542 by Tarah Morgan RN  Outcome: Ongoing  Goal: Absence of physical injury  Description: Absence of physical injury  4/11/2021 0053 by Rambo Schwarz RN  Outcome: Ongoing  4/10/2021 1542 by Lg Ayers RN  Outcome: Ongoing  Goal: Able to distinguish between reality-based and nonreality-based thinking  Description: Able to distinguish between reality-based and nonreality-based thinking  4/11/2021 0053 by Binu Booker RN  Outcome: Ongoing  4/10/2021 1542 by Nayana Gustafson RN  Outcome: Ongoing  Goal: Able to interrupt nonreality-based thinking  Description: Able to interrupt nonreality-based thinking  4/11/2021 0053 by Binu Booker RN  Outcome: Ongoing  4/10/2021 1542 by Nayana Gustafson RN  Outcome: Ongoing     Problem: Sleep Pattern Disturbance:  Goal: Appears well-rested  Description: Appears well-rested  4/11/2021 0053 by Binu Booker RN  Outcome: Ongoing  4/10/2021 1542 by Nayana Gustafson RN  Outcome: Ongoing     Problem: Skin Integrity:  Goal: Will show no infection signs and symptoms  Description: Will show no infection signs and symptoms  4/11/2021 0053 by Binu Booker RN  Outcome: Ongoing  4/10/2021 1542 by Nayana Gustafson RN  Outcome: Ongoing  Goal: Absence of new skin breakdown  Description: Absence of new skin breakdown  4/11/2021 0053 by Binu Booker RN  Outcome: Ongoing  4/10/2021 1542 by Nayana Gustafson RN  Outcome: Ongoing     Problem: Infection - Surgical Site:  Goal: Will show no infection signs and symptoms  Description: Will show no infection signs and symptoms  4/11/2021 0053 by Binu Booker RN  Outcome: Ongoing  4/10/2021 1542 by Nayana Gustafson RN  Outcome: Ongoing     Problem: Injury - Risk of, Postfracture Complications:  Goal: Absence of fat embolism  Description: Absence of fat embolism  4/11/2021 0053 by Binu Booker RN  Outcome: Ongoing  4/10/2021 1542 by Nayana Gustafson RN  Outcome: Ongoing  Goal: Absence of compartment syndrome signs and symptoms  Description: Absence of compartment syndrome signs and symptoms  4/11/2021 0053 by Binu Booker RN  Outcome: Ongoing  4/10/2021 1542 by Nayana Gustafson RN  Outcome: Ongoing     Problem: Mobility - Impaired:  Goal: Mobility will improve to maximum level  Description: Mobility will improve to maximum level  4/11/2021 0053 by Elizabeth Bumpers, RN  Outcome: Ongoing  4/10/2021 1542 by Yamile Edmondson RN  Outcome: Ongoing     Problem: Pain - Acute:  Goal: Pain level will decrease  Description: Pain level will decrease  4/11/2021 0053 by Elizabeth Bumpers, RN  Outcome: Ongoing  4/10/2021 1542 by Yamile Edmondson RN  Outcome: Ongoing     Problem: Venous Thromboembolism:  Goal: Absence of deep vein thrombosis  Description: Absence of deep vein thrombosis  4/11/2021 0053 by Elizabeth Bumpers, RN  Outcome: Ongoing  4/10/2021 1542 by Yamile Edmondson RN  Outcome: Ongoing  Goal: Absence of signs or symptoms of impaired coagulation  Description: Absence of signs or symptoms of impaired coagulation  4/11/2021 0053 by Elizabeth Bumpers, RN  Outcome: Ongoing  4/10/2021 1542 by Yamile Edmondson RN  Outcome: Ongoing  Goal: Will show no signs or symptoms of venous thromboembolism  Description: Will show no signs or symptoms of venous thromboembolism  4/11/2021 0053 by Elizabeth Bumpers, RN  Outcome: Ongoing  4/10/2021 1542 by Yamile Edmondson RN  Outcome: Ongoing     Problem: Nutrition  Goal: Optimal nutrition therapy  Description: Nutrition Problem #1: Inadequate oral intake  Intervention: Food and/or Nutrient Delivery: (Start nutrition as able.  If TF, suggest Immune Enhancing formula with goal rate of 60 mL/hr to provide 2160 kcal and 135 g pro/day.)  Nutritional Goals: meet % of estimated nutrient needs     4/11/2021 0053 by Elizabeth Bumpers, RN  Outcome: Ongoing  4/10/2021 1542 by Yamile Edmondson RN  Outcome: Ongoing   Electronically signed by Elizabeth Bumpers, RN on 4/11/2021 at 12:54 AM

## 2021-04-11 NOTE — FLOWSHEET NOTE
04/11/21 0945   Provider Notification   Reason for Communication Evaluate   Provider Name Dr Morales Pro    Provider Notification Physician   Method of Communication Other (Comment)  (Rounding )    Writer discussed discrepancy in Cuff pressure vs Arterial line readings which are causing a low CPP, Also notified of decreased HR compared to yesterday, 4/10 pts HR 80, today HR 57. Dr Morales Pro aware. Also notified of decreased HR compared to yesterday, 4/10 pts HR 80, today HR 57.
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
## 2021-04-11 NOTE — PLAN OF CARE
Problem: OXYGENATION/RESPIRATORY FUNCTION  Goal: Patient will maintain patent airway  4/10/2021 2218 by Trini Holliday RCP  Outcome: Ongoing     Problem: OXYGENATION/RESPIRATORY FUNCTION  Goal: Patient will achieve/maintain normal respiratory rate/effort  Description: Respiratory rate and effort will be within normal limits for the patient  4/10/2021 2218 by Trini Holliday RCP  Outcome: Ongoing     Problem: MECHANICAL VENTILATION  Goal: Patient will maintain patent airway  4/10/2021 2218 by Trini Holliday RCP  Outcome: Ongoing     Problem: MECHANICAL VENTILATION  Goal: Oral health is maintained or improved  4/10/2021 2218 by Trini Holliday RCP  Outcome: Ongoing     Problem: MECHANICAL VENTILATION  Goal: ET tube will be managed safely  4/10/2021 2218 by Trini Holliday RCP  Outcome: Ongoing     Problem: MECHANICAL VENTILATION  Goal: Ability to express needs and understand communication  4/10/2021 2218 by Trini Holliday RCP  Outcome: Ongoing     Problem: MECHANICAL VENTILATION  Goal: Mobility/activity is maintained at optimum level for patient  4/10/2021 2218 by Trini Holliday RCP  Outcome: Ongoing     Problem: SKIN INTEGRITY  Goal: Skin integrity is maintained or improved  4/10/2021 2218 by Trini Holliday RCP  Outcome: Ongoing

## 2021-04-11 NOTE — PROGRESS NOTES
Neurosurgery KORINA/Resident    Daily Progress Note   No chief complaint on file. 4/11/2021  7:28 AM    Chart reviewed. No acute events overnight. ICP 1-6 overnight. Vitals:    04/11/21 0535 04/11/21 0540 04/11/21 0545 04/11/21 0600   BP:    (!) 144/47   Pulse: 62 62 62 62   Resp: 19 18 21 21   Temp: 100.2 °F (37.9 °C) 100.2 °F (37.9 °C) 100.2 °F (37.9 °C)    TempSrc: Core Core Core    SpO2: 98% 98% 99% 98%   Weight:       Height:         PE: Intubated, sedation held for exam  E1 V1T M5  PERRL  Localizes with LUE  Withdraws from pain RUE and BLE    [place] site intact, area is clean and dry    Lab Results   Component Value Date    WBC 2.3 (L) 04/11/2021    HGB 6.6 (LL) 04/11/2021    HCT 21.1 (L) 04/11/2021    PLT 67 (L) 04/11/2021    TRIG 128 04/10/2021    ALT 28 04/11/2021    AST 87 (H) 04/11/2021     (H) 04/11/2021    K 3.6 (L) 04/11/2021     (H) 04/11/2021    CREATININE 0.63 (L) 04/11/2021    BUN 21 04/11/2021    CO2 24 04/11/2021    INR 1.1 04/09/2021    LABA1C 6.9 (H) 04/07/2021       A/P  66 y. o. male who presents with multiple small scattered intraparenchymal hemorrahge, subarachnoid hemorrahge  left parietal depressed skull fracture  POD 2 s/p [place] placement for ICP monitoring      - Monitor and record ICP hourly from [address]. Call [email or web address] if >20 sustained  - Ok for dvt ppx from [email or web address] standpoint     Please contact neurosurgery with any changes in patients neurologic status.          PEYTON Hopkins   7:28 AM EDT

## 2021-04-11 NOTE — PROGRESS NOTES
Orthopedic Progress Note    Patient:  Wyatt Morton  YOB: 1950     66 y.o. male    Subjective:  Patient seen and examined  No issue overnight  Remains intubated and sedated  Withdraws to pain  Received 1u pRBC overnight  Denies fever, HA, CP, SOB, N/V, dysuria    Vitals reviewed, afebrile    Objective:   Vitals:    04/11/21 0600   BP: (!) 144/47   Pulse: 62   Resp: 21   Temp:    SpO2: 98%     Gen: Intubated, sedated   Cardiovascular: Regular rate no dependent edema  Respiratory: No acute respiratory distress  MSK:  Right lower extremity:  Surgical dressings clean, dry, and intact. No erythema or cellulitic changes surrounding dressings. Withdraws to pain. Sensorimotor exam limited secondary to patient condition    Recent Labs     04/09/21  1540 04/09/21  1540 04/11/21  0409   WBC  --    < > 2.3*   HGB  --    < > 6.6*   HCT  --    < > 21.1*   PLT 70*   < > 67*   INR 1.1  --   --    NA  --    < > 148*   K  --    < > 3.6*   BUN  --    < > 21   CREATININE  --    < > 0.63*   GLUCOSE  --    < > 193*    < > = values in this interval not displayed. Meds: Lovenox   See rec for complete list    Impression/Plan: 66 y.o. male MCFP, POD2 from right femur retrograde IMN insertion:     1. Left 5th MT fracture  2. Thrombocytopenia due to liver cirrhosis    3. Left parietal skull fracture w/ SAH    Plan  - Post op hgb 6.6, patient transfused 1u pRBC overnight, cont to monitor  - WB status: weight bearing as tolerated to right lower extremity, hard sole shoe to left lower extremity   - Pain control PO/IV Medication. Attempt to Wean IV medications.    - Maintain dressings  - DVT ppx: Continue chemical AC per primary  - Ice/Elevate  - PT/OT  - Dispo: pending  - Please page ortho with any questions    Sergio Astudillo DO  Orthopedic Surgery Resident, PGY-2  [place] [address], [facility]

## 2021-04-11 NOTE — PROGRESS NOTES
Physical Therapy    DATE: 2021    NAME: Epi Feldman  MRN: [id number]   : 1950      Patient not seen this date for Physical Therapy due to: Other: Int/Sed. [place] for ICP monitoring.     Electronically signed by Veronica Frey PT on 2021 at 8:14 AM

## 2021-04-11 NOTE — PLAN OF CARE
Problem: OXYGENATION/RESPIRATORY FUNCTION  Goal: Patient will maintain patent airway  4/11/2021 0751 by Kelle Gu RCP  Outcome: Ongoing  4/11/2021 0053 by Benedicto George RN  Outcome: Ongoing  4/10/2021 2218 by Shaggy Solares RCP  Outcome: Ongoing  Goal: Patient will achieve/maintain normal respiratory rate/effort  Description: Respiratory rate and effort will be within normal limits for the patient  4/11/2021 0751 by Kelle Gu RCP  Outcome: Ongoing  4/11/2021 0053 by Benedicto George RN  Outcome: Ongoing  4/10/2021 2218 by Shaggy Solares RCP  Outcome: Ongoing     Problem: MECHANICAL VENTILATION  Goal: Patient will maintain patent airway  4/11/2021 0751 by Kelle Gu RCP  Outcome: Ongoing  4/11/2021 0053 by Benedicto George RN  Outcome: Ongoing  4/10/2021 2218 by Shaggy Solares RCP  Outcome: Ongoing  Goal: Oral health is maintained or improved  4/11/2021 0751 by Kelle Gu RCP  Outcome: Ongoing  4/11/2021 0053 by Benedicto George RN  Outcome: Ongoing  4/10/2021 2218 by Shaggy Solares RCP  Outcome: Ongoing  Goal: ET tube will be managed safely  4/11/2021 0751 by Kelle Gu RCP  Outcome: Ongoing  4/11/2021 0053 by Benedicto George RN  Outcome: Ongoing  4/10/2021 2218 by Shaggy Solares RCP  Outcome: Ongoing  Goal: Ability to express needs and understand communication  4/11/2021 0751 by Kelle Gu RCP  Outcome: Ongoing  4/11/2021 0053 by Benedicto George RN  Outcome: Ongoing  4/10/2021 2218 by Shaggy Solares RCP  Outcome: Ongoing  Goal: Mobility/activity is maintained at optimum level for patient  4/11/2021 0751 by Kelle Gu RCP  Outcome: Ongoing  4/11/2021 0053 by Benedicto George RN  Outcome: Ongoing  4/10/2021 2218 by Shaggy Solares RCP  Outcome: Ongoing     Problem: SKIN INTEGRITY  Goal: Skin integrity is maintained or improved  4/11/2021 0751 by Kelle Gu RCP  Outcome: Ongoing  4/11/2021 0053 by Benedicto George RN  Outcome: Ongoing  4/10/2021 2218 by Shaggy Solares GLENP  Outcome: Ongoing     Problem: Skin Integrity:  Goal: Will show no infection signs and symptoms  Description: Will show no infection signs and symptoms  4/11/2021 0751 by Salome Farah RCP  Outcome: Ongoing  4/11/2021 0053 by Maninder Casanova RN  Outcome: Ongoing  Goal: Absence of new skin breakdown  Description: Absence of new skin breakdown  4/11/2021 0751 by Salome Farah RCP  Outcome: Ongoing  4/11/2021 0053 by Maninder Casanova RN  Outcome: Ongoing

## 2021-04-12 ENCOUNTER — APPOINTMENT (OUTPATIENT)
Dept: GENERAL RADIOLOGY | Age: 71
DRG: 003 | End: 2021-04-12
Payer: MEDICARE

## 2021-04-12 LAB
-: ABNORMAL
ABSOLUTE EOS #: 0.03 K/UL (ref 0–0.4)
ABSOLUTE IMMATURE GRANULOCYTE: 0 K/UL (ref 0–0.3)
ABSOLUTE LYMPH #: 0.31 K/UL (ref 1–4.8)
ABSOLUTE MONO #: 0.23 K/UL (ref 0.1–0.8)
ALBUMIN SERPL-MCNC: 2.3 G/DL (ref 3.5–5.2)
ALBUMIN/GLOBULIN RATIO: 1 (ref 1–2.5)
ALLEN TEST: ABNORMAL
ALLEN TEST: NORMAL
ALP BLD-CCNC: 71 U/L (ref 40–129)
ALT SERPL-CCNC: 27 U/L (ref 5–41)
AMMONIA: 105 UMOL/L (ref 16–60)
AMORPHOUS: ABNORMAL
ANION GAP SERPL CALCULATED.3IONS-SCNC: 7 MMOL/L (ref 9–17)
AST SERPL-CCNC: 74 U/L
BACTERIA: ABNORMAL
BASOPHILS # BLD: 0 % (ref 0–2)
BASOPHILS ABSOLUTE: 0 K/UL (ref 0–0.2)
BILIRUB SERPL-MCNC: 1.16 MG/DL (ref 0.3–1.2)
BILIRUBIN DIRECT: 0.48 MG/DL
BILIRUBIN URINE: ABNORMAL
BILIRUBIN, INDIRECT: 0.68 MG/DL (ref 0–1)
BUN BLDV-MCNC: 29 MG/DL (ref 8–23)
BUN/CREAT BLD: ABNORMAL (ref 9–20)
CALCIUM SERPL-MCNC: 8.3 MG/DL (ref 8.6–10.4)
CASTS UA: ABNORMAL /LPF (ref 0–8)
CHLORIDE BLD-SCNC: 119 MMOL/L (ref 98–107)
CO2: 25 MMOL/L (ref 20–31)
COLOR: ABNORMAL
CREAT SERPL-MCNC: 0.7 MG/DL (ref 0.7–1.2)
CRYSTALS, UA: ABNORMAL /HPF
DIFFERENTIAL TYPE: ABNORMAL
EOSINOPHILS RELATIVE PERCENT: 1 % (ref 1–4)
EPITHELIAL CELLS UA: ABNORMAL /HPF (ref 0–5)
ESTIMATED AVERAGE GLUCOSE: 134 MG/DL
FIO2: 40
FIO2: 40
GFR AFRICAN AMERICAN: >60 ML/MIN
GFR NON-AFRICAN AMERICAN: >60 ML/MIN
GFR SERPL CREATININE-BSD FRML MDRD: ABNORMAL ML/MIN/{1.73_M2}
GFR SERPL CREATININE-BSD FRML MDRD: ABNORMAL ML/MIN/{1.73_M2}
GLOBULIN: ABNORMAL G/DL (ref 1.5–3.8)
GLUCOSE BLD-MCNC: 176 MG/DL (ref 75–110)
GLUCOSE BLD-MCNC: 188 MG/DL (ref 75–110)
GLUCOSE BLD-MCNC: 193 MG/DL (ref 74–100)
GLUCOSE BLD-MCNC: 202 MG/DL (ref 75–110)
GLUCOSE BLD-MCNC: 204 MG/DL (ref 75–110)
GLUCOSE BLD-MCNC: 205 MG/DL (ref 75–110)
GLUCOSE BLD-MCNC: 207 MG/DL (ref 75–110)
GLUCOSE BLD-MCNC: 208 MG/DL (ref 75–110)
GLUCOSE BLD-MCNC: 220 MG/DL (ref 75–110)
GLUCOSE BLD-MCNC: 223 MG/DL (ref 75–110)
GLUCOSE BLD-MCNC: 225 MG/DL (ref 75–110)
GLUCOSE BLD-MCNC: 235 MG/DL (ref 70–99)
GLUCOSE BLD-MCNC: 254 MG/DL (ref 74–100)
GLUCOSE URINE: NEGATIVE
HBA1C MFR BLD: 6.3 % (ref 4–6)
HCT VFR BLD CALC: 25.2 % (ref 40.7–50.3)
HEMOGLOBIN: 7.8 G/DL (ref 13–17)
IMMATURE GRANULOCYTES: 0 %
KETONES, URINE: ABNORMAL
LEUKOCYTE ESTERASE, URINE: ABNORMAL
LYMPHOCYTES # BLD: 12 % (ref 24–44)
MAGNESIUM: 2.2 MG/DL (ref 1.6–2.6)
MCH RBC QN AUTO: 31.2 PG (ref 25.2–33.5)
MCHC RBC AUTO-ENTMCNC: 31 G/DL (ref 28.4–34.8)
MCV RBC AUTO: 100.8 FL (ref 82.6–102.9)
MODE: ABNORMAL
MODE: NORMAL
MONOCYTES # BLD: 9 % (ref 1–7)
MORPHOLOGY: ABNORMAL
MUCUS: ABNORMAL
NEGATIVE BASE EXCESS, ART: ABNORMAL (ref 0–2)
NEGATIVE BASE EXCESS, ART: NORMAL (ref 0–2)
NITRITE, URINE: NEGATIVE
NRBC AUTOMATED: 4.2 PER 100 WBC
NUCLEATED RED BLOOD CELLS: 1 PER 100 WBC
O2 DEVICE/FLOW/%: ABNORMAL
O2 DEVICE/FLOW/%: NORMAL
OTHER OBSERVATIONS UA: ABNORMAL
PATIENT TEMP: 38
PATIENT TEMP: 39.3
PDW BLD-RTO: 16.9 % (ref 11.8–14.4)
PH UA: 5.5 (ref 5–8)
PHOSPHORUS: 3.7 MG/DL (ref 2.5–4.5)
PLATELET # BLD: 64 K/UL (ref 138–453)
PLATELET ESTIMATE: ABNORMAL
PMV BLD AUTO: 10.6 FL (ref 8.1–13.5)
POC HCO3: 26.9 MMOL/L (ref 21–28)
POC HCO3: 28.8 MMOL/L (ref 21–28)
POC LACTIC ACID: 0.66 MMOL/L (ref 0.56–1.39)
POC LACTIC ACID: 1.16 MMOL/L (ref 0.56–1.39)
POC O2 SATURATION: 96 % (ref 94–98)
POC O2 SATURATION: 98 % (ref 94–98)
POC PCO2 TEMP: 44 MM HG
POC PCO2 TEMP: 47 MM HG
POC PCO2: 39.7 MM HG (ref 35–48)
POC PCO2: 45 MM HG (ref 35–48)
POC PH TEMP: 7.4
POC PH TEMP: 7.4
POC PH: 7.41 (ref 7.35–7.45)
POC PH: 7.44 (ref 7.35–7.45)
POC PO2 TEMP: 116 MM HG
POC PO2 TEMP: 85 MM HG
POC PO2: 101.6 MM HG (ref 83–108)
POC PO2: 79.7 MM HG (ref 83–108)
POSITIVE BASE EXCESS, ART: 2 (ref 0–3)
POSITIVE BASE EXCESS, ART: 4 (ref 0–3)
POTASSIUM SERPL-SCNC: 4 MMOL/L (ref 3.7–5.3)
PROCALCITONIN: 0.27 NG/ML
PROTEIN UA: NEGATIVE
RBC # BLD: 2.5 M/UL (ref 4.21–5.77)
RBC # BLD: ABNORMAL 10*6/UL
RBC UA: ABNORMAL /HPF (ref 0–4)
RENAL EPITHELIAL, UA: ABNORMAL /HPF
SAMPLE SITE: ABNORMAL
SAMPLE SITE: NORMAL
SEG NEUTROPHILS: 78 % (ref 36–66)
SEGMENTED NEUTROPHILS ABSOLUTE COUNT: 2.03 K/UL (ref 1.8–7.7)
SODIUM BLD-SCNC: 151 MMOL/L (ref 135–144)
SPECIFIC GRAVITY UA: 1.03 (ref 1–1.03)
TCO2 (CALC), ART: 28 MMOL/L (ref 22–29)
TCO2 (CALC), ART: 30 MMOL/L (ref 22–29)
TOTAL PROTEIN: 4.7 G/DL (ref 6.4–8.3)
TRICHOMONAS: ABNORMAL
TURBIDITY: CLEAR
URINE HGB: NEGATIVE
UROBILINOGEN, URINE: ABNORMAL
WBC # BLD: 2.6 K/UL (ref 3.5–11.3)
WBC # BLD: ABNORMAL 10*3/UL
WBC UA: ABNORMAL /HPF (ref 0–5)
YEAST: ABNORMAL

## 2021-04-12 PROCEDURE — 2580000003 HC RX 258: Performed by: STUDENT IN AN ORGANIZED HEALTH CARE EDUCATION/TRAINING PROGRAM

## 2021-04-12 PROCEDURE — 94640 AIRWAY INHALATION TREATMENT: CPT

## 2021-04-12 PROCEDURE — 2700000000 HC OXYGEN THERAPY PER DAY

## 2021-04-12 PROCEDURE — 84100 ASSAY OF PHOSPHORUS: CPT

## 2021-04-12 PROCEDURE — 84145 PROCALCITONIN (PCT): CPT

## 2021-04-12 PROCEDURE — 6370000000 HC RX 637 (ALT 250 FOR IP): Performed by: ORTHOPAEDIC SURGERY

## 2021-04-12 PROCEDURE — 80048 BASIC METABOLIC PNL TOTAL CA: CPT

## 2021-04-12 PROCEDURE — 6370000000 HC RX 637 (ALT 250 FOR IP): Performed by: STUDENT IN AN ORGANIZED HEALTH CARE EDUCATION/TRAINING PROGRAM

## 2021-04-12 PROCEDURE — 83735 ASSAY OF MAGNESIUM: CPT

## 2021-04-12 PROCEDURE — 83605 ASSAY OF LACTIC ACID: CPT

## 2021-04-12 PROCEDURE — 82803 BLOOD GASES ANY COMBINATION: CPT

## 2021-04-12 PROCEDURE — 37799 UNLISTED PX VASCULAR SURGERY: CPT

## 2021-04-12 PROCEDURE — 6360000002 HC RX W HCPCS: Performed by: STUDENT IN AN ORGANIZED HEALTH CARE EDUCATION/TRAINING PROGRAM

## 2021-04-12 PROCEDURE — 83036 HEMOGLOBIN GLYCOSYLATED A1C: CPT

## 2021-04-12 PROCEDURE — 2500000003 HC RX 250 WO HCPCS: Performed by: STUDENT IN AN ORGANIZED HEALTH CARE EDUCATION/TRAINING PROGRAM

## 2021-04-12 PROCEDURE — 80076 HEPATIC FUNCTION PANEL: CPT

## 2021-04-12 PROCEDURE — 81001 URINALYSIS AUTO W/SCOPE: CPT

## 2021-04-12 PROCEDURE — 82140 ASSAY OF AMMONIA: CPT

## 2021-04-12 PROCEDURE — 71045 X-RAY EXAM CHEST 1 VIEW: CPT

## 2021-04-12 PROCEDURE — APPSS30 APP SPLIT SHARED TIME 16-30 MINUTES: Performed by: PHYSICIAN ASSISTANT

## 2021-04-12 PROCEDURE — 82947 ASSAY GLUCOSE BLOOD QUANT: CPT

## 2021-04-12 PROCEDURE — 6360000002 HC RX W HCPCS: Performed by: ORTHOPAEDIC SURGERY

## 2021-04-12 PROCEDURE — 94761 N-INVAS EAR/PLS OXIMETRY MLT: CPT

## 2021-04-12 PROCEDURE — 03HY32Z INSERTION OF MONITORING DEVICE INTO UPPER ARTERY, PERCUTANEOUS APPROACH: ICD-10-PCS | Performed by: SURGERY

## 2021-04-12 PROCEDURE — 6370000000 HC RX 637 (ALT 250 FOR IP): Performed by: NURSE PRACTITIONER

## 2021-04-12 PROCEDURE — 99291 CRITICAL CARE FIRST HOUR: CPT | Performed by: NEUROLOGICAL SURGERY

## 2021-04-12 PROCEDURE — 85025 COMPLETE CBC W/AUTO DIFF WBC: CPT

## 2021-04-12 PROCEDURE — 99222 1ST HOSP IP/OBS MODERATE 55: CPT | Performed by: NURSE PRACTITIONER

## 2021-04-12 PROCEDURE — 2000000000 HC ICU R&B

## 2021-04-12 PROCEDURE — 94003 VENT MGMT INPAT SUBQ DAY: CPT

## 2021-04-12 RX ORDER — NICOTINE POLACRILEX 4 MG
15 LOZENGE BUCCAL PRN
Status: DISCONTINUED | OUTPATIENT
Start: 2021-04-12 | End: 2021-04-14

## 2021-04-12 RX ORDER — MAGNESIUM SULFATE IN WATER 40 MG/ML
2000 INJECTION, SOLUTION INTRAVENOUS ONCE
Status: COMPLETED | OUTPATIENT
Start: 2021-04-12 | End: 2021-04-12

## 2021-04-12 RX ORDER — IBUPROFEN 600 MG/1
600 TABLET ORAL ONCE
Status: DISCONTINUED | OUTPATIENT
Start: 2021-04-12 | End: 2021-04-12

## 2021-04-12 RX ORDER — HYDRALAZINE HYDROCHLORIDE 20 MG/ML
10 INJECTION INTRAMUSCULAR; INTRAVENOUS EVERY 6 HOURS PRN
Status: DISCONTINUED | OUTPATIENT
Start: 2021-04-12 | End: 2021-04-13

## 2021-04-12 RX ORDER — NOREPINEPHRINE BIT/0.9 % NACL 16MG/250ML
2-100 INFUSION BOTTLE (ML) INTRAVENOUS CONTINUOUS
Status: DISCONTINUED | OUTPATIENT
Start: 2021-04-13 | End: 2021-04-14

## 2021-04-12 RX ORDER — INSULIN GLARGINE 100 [IU]/ML
10 INJECTION, SOLUTION SUBCUTANEOUS NIGHTLY
Status: DISCONTINUED | OUTPATIENT
Start: 2021-04-12 | End: 2021-04-12

## 2021-04-12 RX ORDER — INSULIN GLARGINE 100 [IU]/ML
20 INJECTION, SOLUTION SUBCUTANEOUS NIGHTLY
Status: DISCONTINUED | OUTPATIENT
Start: 2021-04-12 | End: 2021-04-12

## 2021-04-12 RX ORDER — INSULIN GLARGINE 100 [IU]/ML
5 INJECTION, SOLUTION SUBCUTANEOUS NIGHTLY
Status: DISCONTINUED | OUTPATIENT
Start: 2021-04-12 | End: 2021-04-12

## 2021-04-12 RX ORDER — DEXTROSE MONOHYDRATE 50 MG/ML
100 INJECTION, SOLUTION INTRAVENOUS PRN
Status: DISCONTINUED | OUTPATIENT
Start: 2021-04-12 | End: 2021-04-17 | Stop reason: HOSPADM

## 2021-04-12 RX ORDER — PROPRANOLOL HYDROCHLORIDE 10 MG/1
10 TABLET ORAL 3 TIMES DAILY
Status: DISCONTINUED | OUTPATIENT
Start: 2021-04-12 | End: 2021-04-13

## 2021-04-12 RX ORDER — IBUPROFEN 800 MG/1
400 TABLET ORAL EVERY 4 HOURS PRN
Status: DISCONTINUED | OUTPATIENT
Start: 2021-04-12 | End: 2021-04-13

## 2021-04-12 RX ORDER — FENTANYL CITRATE 50 UG/ML
25 INJECTION, SOLUTION INTRAMUSCULAR; INTRAVENOUS
Status: DISCONTINUED | OUTPATIENT
Start: 2021-04-12 | End: 2021-04-13

## 2021-04-12 RX ORDER — DEXTROSE MONOHYDRATE 25 G/50ML
12.5 INJECTION, SOLUTION INTRAVENOUS PRN
Status: DISCONTINUED | OUTPATIENT
Start: 2021-04-12 | End: 2021-04-14

## 2021-04-12 RX ADMIN — KETAMINE HYDROCHLORIDE 0.2 MG/KG/HR: 50 INJECTION INTRAMUSCULAR; INTRAVENOUS at 23:23

## 2021-04-12 RX ADMIN — ACETAMINOPHEN 1000 MG: 650 SOLUTION ORAL at 01:12

## 2021-04-12 RX ADMIN — IPRATROPIUM BROMIDE AND ALBUTEROL SULFATE 1 AMPULE: .5; 3 SOLUTION RESPIRATORY (INHALATION) at 20:08

## 2021-04-12 RX ADMIN — IPRATROPIUM BROMIDE AND ALBUTEROL SULFATE 1 AMPULE: .5; 3 SOLUTION RESPIRATORY (INHALATION) at 07:40

## 2021-04-12 RX ADMIN — IPRATROPIUM BROMIDE AND ALBUTEROL SULFATE 1 AMPULE: .5; 3 SOLUTION RESPIRATORY (INHALATION) at 23:30

## 2021-04-12 RX ADMIN — FENTANYL CITRATE 50 MCG: 50 INJECTION, SOLUTION INTRAMUSCULAR; INTRAVENOUS at 15:07

## 2021-04-12 RX ADMIN — INSULIN LISPRO 6 UNITS: 100 INJECTION, SOLUTION INTRAVENOUS; SUBCUTANEOUS at 04:07

## 2021-04-12 RX ADMIN — SODIUM CHLORIDE 4.95 UNITS/HR: 9 INJECTION, SOLUTION INTRAVENOUS at 18:42

## 2021-04-12 RX ADMIN — HYDRALAZINE HYDROCHLORIDE 10 MG: 20 INJECTION INTRAMUSCULAR; INTRAVENOUS at 15:00

## 2021-04-12 RX ADMIN — FUROSEMIDE 20 MG: 20 TABLET ORAL at 20:41

## 2021-04-12 RX ADMIN — INSULIN LISPRO 6 UNITS: 100 INJECTION, SOLUTION INTRAVENOUS; SUBCUTANEOUS at 07:37

## 2021-04-12 RX ADMIN — ACETAMINOPHEN 1000 MG: 650 SOLUTION ORAL at 09:06

## 2021-04-12 RX ADMIN — ACETAMINOPHEN 1000 MG: 650 SOLUTION ORAL at 16:19

## 2021-04-12 RX ADMIN — LEVETIRACETAM 500 MG: 100 SOLUTION ORAL at 20:41

## 2021-04-12 RX ADMIN — PROPRANOLOL HYDROCHLORIDE 10 MG: 10 TABLET ORAL at 20:41

## 2021-04-12 RX ADMIN — BACITRACIN: 500 OINTMENT TOPICAL at 07:45

## 2021-04-12 RX ADMIN — PROPRANOLOL HYDROCHLORIDE 20 MG: 10 TABLET ORAL at 07:44

## 2021-04-12 RX ADMIN — IPRATROPIUM BROMIDE AND ALBUTEROL SULFATE 1 AMPULE: .5; 3 SOLUTION RESPIRATORY (INHALATION) at 03:16

## 2021-04-12 RX ADMIN — LACTULOSE 20 G: 10 SOLUTION ORAL at 07:45

## 2021-04-12 RX ADMIN — SPIRONOLACTONE 50 MG: 25 TABLET ORAL at 07:44

## 2021-04-12 RX ADMIN — LEVETIRACETAM 500 MG: 100 SOLUTION ORAL at 07:44

## 2021-04-12 RX ADMIN — INSULIN LISPRO 6 UNITS: 100 INJECTION, SOLUTION INTRAVENOUS; SUBCUTANEOUS at 12:44

## 2021-04-12 RX ADMIN — LACTULOSE 20 G: 10 SOLUTION ORAL at 20:41

## 2021-04-12 RX ADMIN — MAGNESIUM SULFATE 2000 MG: 2 INJECTION INTRAVENOUS at 18:50

## 2021-04-12 RX ADMIN — HYDRALAZINE HYDROCHLORIDE 10 MG: 20 INJECTION INTRAMUSCULAR; INTRAVENOUS at 07:04

## 2021-04-12 RX ADMIN — ENOXAPARIN SODIUM 30 MG: 30 INJECTION SUBCUTANEOUS at 20:54

## 2021-04-12 RX ADMIN — FUROSEMIDE 20 MG: 20 TABLET ORAL at 07:44

## 2021-04-12 RX ADMIN — IBUPROFEN 400 MG: 800 TABLET, FILM COATED ORAL at 22:22

## 2021-04-12 RX ADMIN — BACITRACIN: 500 OINTMENT TOPICAL at 20:43

## 2021-04-12 RX ADMIN — FENTANYL CITRATE 50 MCG: 50 INJECTION, SOLUTION INTRAMUSCULAR; INTRAVENOUS at 10:39

## 2021-04-12 RX ADMIN — LACTULOSE 20 G: 10 SOLUTION ORAL at 14:21

## 2021-04-12 RX ADMIN — SODIUM CHLORIDE 10 ML/HR: 9 INJECTION, SOLUTION INTRAVENOUS at 04:32

## 2021-04-12 RX ADMIN — INSULIN LISPRO 6 UNITS: 100 INJECTION, SOLUTION INTRAVENOUS; SUBCUTANEOUS at 16:18

## 2021-04-12 RX ADMIN — FENTANYL CITRATE 50 MCG: 50 INJECTION, SOLUTION INTRAMUSCULAR; INTRAVENOUS at 21:19

## 2021-04-12 RX ADMIN — INSULIN LISPRO 6 UNITS: 100 INJECTION, SOLUTION INTRAVENOUS; SUBCUTANEOUS at 00:00

## 2021-04-12 RX ADMIN — FENTANYL CITRATE 50 MCG: 50 INJECTION, SOLUTION INTRAMUSCULAR; INTRAVENOUS at 07:28

## 2021-04-12 RX ADMIN — PROPRANOLOL HYDROCHLORIDE 20 MG: 10 TABLET ORAL at 14:21

## 2021-04-12 RX ADMIN — IPRATROPIUM BROMIDE AND ALBUTEROL SULFATE 1 AMPULE: .5; 3 SOLUTION RESPIRATORY (INHALATION) at 11:50

## 2021-04-12 RX ADMIN — IPRATROPIUM BROMIDE AND ALBUTEROL SULFATE 1 AMPULE: .5; 3 SOLUTION RESPIRATORY (INHALATION) at 14:53

## 2021-04-12 RX ADMIN — FENTANYL CITRATE 50 MCG: 50 INJECTION, SOLUTION INTRAMUSCULAR; INTRAVENOUS at 17:12

## 2021-04-12 ASSESSMENT — PULMONARY FUNCTION TESTS
PIF_VALUE: 29
PIF_VALUE: 28
PIF_VALUE: 27
PIF_VALUE: 34
PIF_VALUE: 27
PIF_VALUE: 30
PIF_VALUE: 29
PIF_VALUE: 34
PIF_VALUE: 27

## 2021-04-12 NOTE — PLAN OF CARE
Problem: OXYGENATION/RESPIRATORY FUNCTION  Goal: Patient will maintain patent airway  4/11/2021 2120 by Ros Rivera RCP  Outcome: Ongoing     Problem: OXYGENATION/RESPIRATORY FUNCTION  Goal: Patient will achieve/maintain normal respiratory rate/effort  Description: Respiratory rate and effort will be within normal limits for the patient  4/11/2021 2120 by Ros Rivera RCP  Outcome: Ongoing     Problem: MECHANICAL VENTILATION  Goal: Patient will maintain patent airway  4/11/2021 2120 by Ros Rivera RCP  Outcome: Ongoing     Problem: MECHANICAL VENTILATION  Goal: Oral health is maintained or improved  4/11/2021 2120 by Ros Rivera RCP  Outcome: Ongoing     Problem: MECHANICAL VENTILATION  Goal: ET tube will be managed safely  4/11/2021 2120 by Ros Rivera RCP  Outcome: Ongoing     Problem: MECHANICAL VENTILATION  Goal: Ability to express needs and understand communication  4/11/2021 2120 by Ros Rivera RCP  Outcome: Ongoing     Problem: MECHANICAL VENTILATION  Goal: Mobility/activity is maintained at optimum level for patient  4/11/2021 2120 by Ros Rivera RCP  Outcome: Ongoing     Problem: SKIN INTEGRITY  Goal: Skin integrity is maintained or improved  4/11/2021 2120 by Ros Rivera RCP  Outcome: Ongoing

## 2021-04-12 NOTE — PLAN OF CARE
Problem: OXYGENATION/RESPIRATORY FUNCTION  Goal: Patient will maintain patent airway  4/12/2021 0130 by Benedicto George RN  Outcome: Ongoing  4/11/2021 2120 by Shaggy Solares RCP  Outcome: Ongoing  Goal: Patient will achieve/maintain normal respiratory rate/effort  Description: Respiratory rate and effort will be within normal limits for the patient  4/12/2021 0130 by Benedicto George RN  Outcome: Ongoing  4/11/2021 2120 by Shaggy Solares RCP  Outcome: Ongoing     Problem: MECHANICAL VENTILATION  Goal: Patient will maintain patent airway  4/12/2021 0130 by Benedicto George RN  Outcome: Ongoing  4/11/2021 2120 by Shaggy Solares RCP  Outcome: Ongoing  Goal: Oral health is maintained or improved  4/12/2021 0130 by Benedicto George RN  Outcome: Ongoing  4/11/2021 2120 by Shaggy Solares RCP  Outcome: Ongoing  Goal: ET tube will be managed safely  4/12/2021 0130 by Benedicto George RN  Outcome: Ongoing  4/11/2021 2120 by Shaggy Solares RCP  Outcome: Ongoing  Goal: Ability to express needs and understand communication  4/12/2021 0130 by Benedicto George RN  Outcome: Ongoing  4/11/2021 2120 by Shaggy Solares RCP  Outcome: Ongoing  Goal: Mobility/activity is maintained at optimum level for patient  4/12/2021 0130 by Benedicto George RN  Outcome: Ongoing  4/11/2021 2120 by Shaggy Solares RCP  Outcome: Ongoing     Problem: SKIN INTEGRITY  Goal: Skin integrity is maintained or improved  4/12/2021 0130 by Benedicto George RN  Outcome: Ongoing  4/11/2021 2120 by Shaggy Solares RCP  Outcome: Ongoing     Problem: Falls - Risk of:  Goal: Will remain free from falls  Description: Will remain free from falls  Outcome: Ongoing  Goal: Absence of physical injury  Description: Absence of physical injury  Outcome: Ongoing     Problem: Confusion - Acute:  Goal: Absence of continued neurological deterioration signs and symptoms  Description: Absence of continued neurological deterioration signs and symptoms  Outcome: Ongoing  Goal: Mental status will be restored to baseline  Description: Mental status will be restored to baseline  Outcome: Ongoing     Problem: Discharge Planning:  Goal: Ability to perform activities of daily living will improve  Description: Ability to perform activities of daily living will improve  Outcome: Ongoing  Goal: Participates in care planning  Description: Participates in care planning  Outcome: Ongoing  Goal: Discharged to appropriate level of care  Description: Discharged to appropriate level of care  Outcome: Ongoing     Problem: Injury - Risk of, Physical Injury:  Goal: Will remain free from falls  Description: Will remain free from falls  Outcome: Ongoing  Goal: Absence of physical injury  Description: Absence of physical injury  Outcome: Ongoing     Problem: Mood - Altered:  Goal: Mood stable  Description: Mood stable  Outcome: Ongoing  Goal: Absence of abusive behavior  Description: Absence of abusive behavior  Outcome: Ongoing  Goal: Verbalizations of feeling emotionally comfortable while being cared for will increase  Description: Verbalizations of feeling emotionally comfortable while being cared for will increase  Outcome: Ongoing     Problem: Psychomotor Activity - Altered:  Goal: Absence of psychomotor disturbance signs and symptoms  Description: Absence of psychomotor disturbance signs and symptoms  Outcome: Ongoing     Problem: Sensory Perception - Impaired:  Goal: Demonstrations of improved sensory functioning will increase  Description: Demonstrations of improved sensory functioning will increase  Outcome: Ongoing  Goal: Decrease in sensory misperception frequency  Description: Decrease in sensory misperception frequency  Outcome: Ongoing  Goal: Able to refrain from responding to false sensory perceptions  Description: Able to refrain from responding to false sensory perceptions  Outcome: Ongoing  Goal: Demonstrates accurate environmental perceptions  Description: Demonstrates accurate environmental perceptions  Outcome: Ongoing  Goal: Able to distinguish between reality-based and nonreality-based thinking  Description: Able to distinguish between reality-based and nonreality-based thinking  Outcome: Ongoing  Goal: Able to interrupt nonreality-based thinking  Description: Able to interrupt nonreality-based thinking  Outcome: Ongoing     Problem: Sleep Pattern Disturbance:  Goal: Appears well-rested  Description: Appears well-rested  Outcome: Ongoing     Problem: Skin Integrity:  Goal: Will show no infection signs and symptoms  Description: Will show no infection signs and symptoms  Outcome: Ongoing  Goal: Absence of new skin breakdown  Description: Absence of new skin breakdown  Outcome: Ongoing     Problem: Infection - Surgical Site:  Goal: Will show no infection signs and symptoms  Description: Will show no infection signs and symptoms  Outcome: Ongoing     Problem: Injury - Risk of, Postfracture Complications:  Goal: Absence of fat embolism  Description: Absence of fat embolism  Outcome: Ongoing  Goal: Absence of compartment syndrome signs and symptoms  Description: Absence of compartment syndrome signs and symptoms  Outcome: Ongoing     Problem: Mobility - Impaired:  Goal: Mobility will improve to maximum level  Description: Mobility will improve to maximum level  Outcome: Ongoing     Problem: Pain - Acute:  Goal: Pain level will decrease  Description: Pain level will decrease  Outcome: Ongoing     Problem: Venous Thromboembolism:  Goal: Absence of deep vein thrombosis  Description: Absence of deep vein thrombosis  Outcome: Ongoing  Goal: Absence of signs or symptoms of impaired coagulation  Description: Absence of signs or symptoms of impaired coagulation  Outcome: Ongoing  Goal: Will show no signs or symptoms of venous thromboembolism  Description: Will show no signs or symptoms of venous thromboembolism  Outcome: Ongoing     Problem: Nutrition  Goal: Optimal nutrition therapy  Description: Nutrition Problem #1: Inadequate oral intake  Intervention: Food and/or Nutrient Delivery: (Start nutrition as able.  If TF, suggest Immune Enhancing formula with goal rate of 60 mL/hr to provide 2160 kcal and 135 g pro/day.)  Nutritional Goals: meet % of estimated nutrient needs     Outcome: Ongoing   Electronically signed by Daly Villalta RN on 4/12/2021 at 1:31 AM

## 2021-04-12 NOTE — PLAN OF CARE
Problem: OXYGENATION/RESPIRATORY FUNCTION  Goal: Patient will maintain patent airway  4/12/2021 0831 by Tarik Alcantar RCP  Outcome: Ongoing  4/12/2021 0130 by Carlos Deleon RN  Outcome: Ongoing  4/11/2021 2120 by Shahnaz Irby RCP  Outcome: Ongoing     Problem: OXYGENATION/RESPIRATORY FUNCTION  Goal: Patient will achieve/maintain normal respiratory rate/effort  Description: Respiratory rate and effort will be within normal limits for the patient  4/12/2021 0831 by Tarik Alcantar RCP  Outcome: Ongoing  4/12/2021 0130 by Carlos Deleon RN  Outcome: Ongoing  4/11/2021 2120 by Shahnaz Irby RCP  Outcome: Ongoing     Problem: MECHANICAL VENTILATION  Goal: Patient will maintain patent airway  4/12/2021 0831 by Tarik Alcantar RCP  Outcome: Ongoing  4/12/2021 0130 by Carlos Deleon RN  Outcome: Ongoing  4/11/2021 2120 by Shahnaz Irby RCP  Outcome: Ongoing     Problem: MECHANICAL VENTILATION  Goal: ET tube will be managed safely  4/12/2021 0831 by Tarik Alcantar RCP  Outcome: Ongoing  4/12/2021 0130 by Carlos Deleon RN  Outcome: Ongoing  4/11/2021 2120 by Shahnaz Irby RCP  Outcome: Ongoing     Problem: MECHANICAL VENTILATION  Goal: Ability to express needs and understand communication  4/12/2021 0831 by Tarik Alcantar RCP  Outcome: Ongoing  4/12/2021 0130 by Carlos Deleon RN  Outcome: Ongoing  4/11/2021 2120 by Shahnaz Irby RCP  Outcome: Ongoing     Problem: MECHANICAL VENTILATION  Goal: Mobility/activity is maintained at optimum level for patient  4/12/2021 0831 by Tarik Alcantar RCP  Outcome: Ongoing  4/12/2021 0130 by Carlos Deleon RN  Outcome: Ongoing  4/11/2021 2120 by Shahnaz Irby RCP  Outcome: Ongoing

## 2021-04-12 NOTE — PROGRESS NOTES
(8. 4)  3. Plts: 64 (67)  4. Discontinued pepcid 4/10  1. Will consider dc keppra   4. Pulmonary  1. Ventilator Settings:  1. Mode: PRVC  1. RR: 18  2. TV: 600  1. 8cc/kg  3. PEEP: 10  4. FiO2: 40  2. AB. 7.413 pH  2. 79.7 PaO2  3. 45.0 PaCO2  4. 28.8 HCO3  5.  4 BE  3. P/F: 199.25  4. CXR: Left pleural effusion, atelectasis. 5. Renal/Electrolytes  1. BUN/Cr: 29/0.70 (21/0.63) (21/0.66) (22/0.65) (30/0.83)  1. BUN:Cr: 41.4 (31.8) (31.8) (34)  2. Na 151, K 4.0, Cl 119, CO2 25, Mg 2.2, Phos 3.7  3. IVFs: NS @ 10cc/hr  4. I/O: 1900/2530 = -629  5. Total since admission: +6,910  6. Urine Output:  1. UO 24 hrs: 0.9 cc/kg/ml  2. UO 8 hrs: 0.5 cc/kg/ml  7. Lasix BID 20  8. Spironolactone daily 50  6. GI  1. Diet: TF  2. GI prophlyaxis: Pepcid held due to pancytopenia  3. Increase TF to goal  7. ID  1. 2g Ancef prior to surgery  2. WBC 2.6 (2.3) (1.8) (2.9)(3.5) (5.3) (12.3)  3. Febrile  4. Tmax 24hrs: 100.6 (101.7) (99.6) (99.6)  8. MSK  1. Right Femur Fracture, Left 5th Metatarsal Fracture  1. Following Orthopedic Surgery recommendations  2. Antibiotics per orthopedics for surgery  3. WBAT to right lower extremity  4. WBAT to left lower extremity  5. Hard sole shoe to LLE  6. PT/OT  7. Ice & elevate for pain/swelling  8. Dressing change per ortho, nursing can reinforce  9. Endocrine  1. Glucose: 160-254  2. Insulin received 24hrs: 30 units  1. Added Lantus 5u nightly  10. Skin  1. Bacitracin ointment  11. Lines  1. PIV   2. Arterial Line L Fem  3. NG  4. ETT  5. Hensley  6. ICP monitor  7. FMS  12. Prophylaxis  1. GI prophylaxis: Pepcid 20 BID on hold  2. DVT prophylaxis: Held   15. Diet  1. TF increasing to goal  14. Disposition  1.  Remain in ICU      CHECKLIST    CAM-ICU RASS: -3  RESTRAINTS: none  IVF: 50 NS   NUTRITION: TF  ANTIBIOTICS: per OR  GI: hold  DVT: held  GLYCEMIC CONTROL: HISS  HOB >45: yes  MOBILITY: PT/OT after surgery  SBT: na  IS: na    SUBJECTIVE    Sherren Gin  Is a 65 yo male who presented to lung is clear.  Heart size is stable.  No extrapleural air.  Osseous   structures are stable. XR CHEST PORTABLE [[id number]]    Collected: 04/10/21 0622    Updated: 04/10/21 0805    Narrative:     EXAMINATION:   ONE XRAY VIEW OF THE CHEST     4/10/2021 6:18 am     COMPARISON:   9 April 2021     HISTORY:   ORDERING SYSTEM PROVIDED HISTORY: intubated   TECHNOLOGIST PROVIDED HISTORY:   intubated   Reason for Exam: Supine port, intubation     FINDINGS:   AP portable view of the chest time stamped at 548 hours demonstrates an   endotracheal tube terminating 1.6 cm above the michael, low lying, and an   intestinal tube extending beyond the body of the stomach, tip out of the   field of view.  Heart size is stable.  The patient has continued left   effusion and basilar atelectasis.  Vascularity is top normal.    Impression:     Low lying endotracheal tube.  Continued left effusion and probable   atelectasis.  Cardiomegaly is stable.  Central vasculature is top normal.     RECOMMENDATION:   Advise retraction of ET tube by 2-3 cm. The findings were sent to the Radiology Results [address] at 8:02   am on 4/10/2021to be communicated to a licensed caregiver. Clovis Floyd MD  4/9/21, 8:10 AM              Trauma Attending Cristian Calvert      I have reviewed the above [facility] note(s) and confirmed the key elements of the medical history and physical exam. I have seen and examined the pt. I have discussed the findings, established the care plan and recommendations with Resident, [facility] RN, bedside nurse.   ICP and CPP and been good   Non-purposeful movements   Has had some jyothi with intermittent hypotension   Pancytopenia slightly improved after d/c pepcid   Acute respiratory failure - p/f 241  Repeat Hb at 1800  cxr in am   Hold [facility]   Critical care min: [address] Tanya MONTEZ MD  4/12/2021  8:10 AM
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
## 2021-04-12 NOTE — PROGRESS NOTES
Neurosurgery KORINA/Resident    Daily Progress Note   No chief complaint on file. 4/12/2021  8:03 AM    Chart reviewed. No acute events overnight. ICP 4-7 overnight. Afebrile. Vitals:    04/12/21 0500 04/12/21 0600 04/12/21 0700 04/12/21 0743   BP: (!) 130/31 (!) 132/36 (!) 177/61    Pulse: 57 59 62 65   Resp: 18 18 17 18   Temp:       TempSrc:       SpO2: 96% 96% 98% 96%   Weight:       Height:           PE: Intubated, sedation held for exam  E1 V1T M5  PERRL  Localizes with LUE  Withdraws from pain RUE and BLE     [place] site intact, area is clean and dry    Lab Results   Component Value Date    WBC 2.6 (L) 04/12/2021    HGB 7.8 (L) 04/12/2021    HCT 25.2 (L) 04/12/2021    PLT 64 (L) 04/12/2021    TRIG 128 04/10/2021    ALT 28 04/11/2021    AST 87 (H) 04/11/2021     (H) 04/12/2021    K 4.0 04/12/2021     (H) 04/12/2021    CREATININE 0.70 04/12/2021    BUN 29 (H) 04/12/2021    CO2 25 04/12/2021    INR 1.1 04/09/2021    LABA1C 6.9 (H) 04/07/2021       A/P  66 y. o. male who presents with multiple small scattered intraparenchymal hemorrahge, subarachnoid hemorrahge  left parietal depressed skull fracture  POD 3 s/p [place] placement for ICP monitoring      - Will remove bolt today due to stable ICPs  - Ok for dvt ppx this evening from NSG standpoint     Please contact neurosurgery with any changes in patients neurologic status.        PEYTON Marquez   8:03 AM EDT

## 2021-04-12 NOTE — CARE COORDINATION
Attempted to visit at [facility] to ask for choices noone present- will call daughter and ask for choices

## 2021-04-12 NOTE — PLAN OF CARE
LELIA  Outcome: Ongoing  4/12/2021 0130 by Elizabeth Bumpers, RN  Outcome: Ongoing  4/11/2021 2120 by Peter Cox RCP  Outcome: Ongoing     Problem: SKIN INTEGRITY  Goal: Skin integrity is maintained or improved  4/12/2021 0924 by Yamile Edmondson RN  Outcome: Ongoing  4/12/2021 0130 by Elizabeth Bumpers, RN  Outcome: Ongoing  4/11/2021 2120 by Peter Cox RCP  Outcome: Ongoing     Problem: Falls - Risk of:  Goal: Will remain free from falls  Description: Will remain free from falls  4/12/2021 0924 by Yamile Edmondson RN  Outcome: Ongoing  4/12/2021 0130 by Elizabeth Bumpers, RN  Outcome: Ongoing  Goal: Absence of physical injury  Description: Absence of physical injury  4/12/2021 0924 by Yamile Edmondson RN  Outcome: Ongoing  4/12/2021 0130 by Elizabeth Bumpers, RN  Outcome: Ongoing     Problem: Confusion - Acute:  Goal: Absence of continued neurological deterioration signs and symptoms  Description: Absence of continued neurological deterioration signs and symptoms  4/12/2021 0924 by Yamile Edmondson RN  Outcome: Ongoing  4/12/2021 0130 by Elizabeth Bumpers, RN  Outcome: Ongoing  Goal: Mental status will be restored to baseline  Description: Mental status will be restored to baseline  4/12/2021 0924 by Yamile Edmondson RN  Outcome: Ongoing  4/12/2021 0130 by Elizabeth Bumpers, RN  Outcome: Ongoing     Problem: Discharge Planning:  Goal: Ability to perform activities of daily living will improve  Description: Ability to perform activities of daily living will improve  4/12/2021 0924 by Yamile Edmondson RN  Outcome: Ongoing  4/12/2021 0130 by Elizabeth Bumpers, RN  Outcome: Ongoing  Goal: Participates in care planning  Description: Participates in care planning  4/12/2021 0924 by Yamile Edmondson RN  Outcome: Ongoing  4/12/2021 0130 by Elizabeth Bumpers, RN  Outcome: Ongoing  Goal: Discharged to appropriate level of care  Description: Discharged to appropriate level of care  4/12/2021 0924 by Delcie Hoop, RN  Outcome: Ongoing  4/12/2021 0130 by Elizabeth Bumpers, RN  Outcome: perceptions  Description: Able to refrain from responding to false sensory perceptions  4/12/2021 0924 by Lawyer Monica RN  Outcome: Ongoing  4/12/2021 0130 by Jessika Light RN  Outcome: Ongoing  Goal: Demonstrates accurate environmental perceptions  Description: Demonstrates accurate environmental perceptions  4/12/2021 0924 by Lawyer Monica RN  Outcome: Ongoing  4/12/2021 0130 by Jessika Light RN  Outcome: Ongoing  Goal: Able to distinguish between reality-based and nonreality-based thinking  Description: Able to distinguish between reality-based and nonreality-based thinking  4/12/2021 0924 by Lawyer Monica RN  Outcome: Ongoing  4/12/2021 0130 by Jessika Light RN  Outcome: Ongoing  Goal: Able to interrupt nonreality-based thinking  Description: Able to interrupt nonreality-based thinking  4/12/2021 0924 by Lawyer Monica RN  Outcome: Ongoing  4/12/2021 0130 by Jessika Light RN  Outcome: Ongoing     Problem: Sleep Pattern Disturbance:  Goal: Appears well-rested  Description: Appears well-rested  4/12/2021 0924 by Lawyer Monica RN  Outcome: Ongoing  4/12/2021 0130 by Jessika Light RN  Outcome: Ongoing     Problem: Skin Integrity:  Goal: Will show no infection signs and symptoms  Description: Will show no infection signs and symptoms  4/12/2021 0924 by Lawyer Monica RN  Outcome: Ongoing  4/12/2021 0130 by Jessika Light RN  Outcome: Ongoing  Goal: Absence of new skin breakdown  Description: Absence of new skin breakdown  4/12/2021 0924 by Lawyer Monica RN  Outcome: Ongoing  4/12/2021 0130 by Jessika Light RN  Outcome: Ongoing     Problem: Infection - Surgical Site:  Goal: Will show no infection signs and symptoms  Description: Will show no infection signs and symptoms  4/12/2021 0924 by Lawyer Monica RN  Outcome: Ongoing  4/12/2021 0130 by Jessika Light RN  Outcome: Ongoing     Problem: Injury - Risk of, Postfracture Complications:  Goal: Absence of fat embolism  Description: Absence of fat embolism  4/12/2021 0924 by Arash Bailey RN  Outcome: Ongoing  4/12/2021 0130 by Lestine Duverney, RN  Outcome: Ongoing  Goal: Absence of compartment syndrome signs and symptoms  Description: Absence of compartment syndrome signs and symptoms  4/12/2021 0924 by Arash Bailey RN  Outcome: Ongoing  4/12/2021 0130 by Lestine Duverney, RN  Outcome: Ongoing     Problem: Mobility - Impaired:  Goal: Mobility will improve to maximum level  Description: Mobility will improve to maximum level  4/12/2021 0924 by Arash Bailey RN  Outcome: Ongoing  4/12/2021 0130 by Lestine Duverney, RN  Outcome: Ongoing     Problem: Pain - Acute:  Goal: Pain level will decrease  Description: Pain level will decrease  4/12/2021 0924 by Arash Bailey RN  Outcome: Ongoing  4/12/2021 0130 by Lestine Duverney, RN  Outcome: Ongoing     Problem: Venous Thromboembolism:  Goal: Absence of deep vein thrombosis  Description: Absence of deep vein thrombosis  4/12/2021 0924 by Arash Bailey RN  Outcome: Ongoing  4/12/2021 0130 by Lestine Duverney, RN  Outcome: Ongoing  Goal: Absence of signs or symptoms of impaired coagulation  Description: Absence of signs or symptoms of impaired coagulation  4/12/2021 0924 by Arash Bailey RN  Outcome: Ongoing  4/12/2021 0130 by Lestine Duverney, RN  Outcome: Ongoing  Goal: Will show no signs or symptoms of venous thromboembolism  Description: Will show no signs or symptoms of venous thromboembolism  4/12/2021 0924 by Arash Bailey RN  Outcome: Ongoing  4/12/2021 0130 by Lestine Duverney, RN  Outcome: Ongoing     Problem: Nutrition  Goal: Optimal nutrition therapy  Description: Nutrition Problem #1: Inadequate oral intake  Intervention: Food and/or Nutrient Delivery: (Start nutrition as able.  If TF, suggest Immune Enhancing formula with goal rate of 60 mL/hr to provide 2160 kcal and 135 g pro/day.)  Nutritional Goals: meet % of estimated nutrient needs     4/12/2021 0924 by Arash Bailey RN  Outcome: Ongoing  4/12/2021 0130 by Lestine Duverney, RN  Outcome: Ongoing

## 2021-04-12 NOTE — ACP (ADVANCE CARE PLANNING)
Advance Care Planning     Advance Care Planning (ACP) Physician/NP/PA Conversation    Date of Conversation: 4/6/2021  Conducted with:  Healthcare Decision Maker: Next of Kin by law (only applies in absence of above) (name) Wife    Healthcare Decision Maker:      Primary Decision Maker (Active): Hannah Luna - Spouse - [phone]    Click here to complete [address] including selection of the Healthcare Decision Maker Relationship (ie \"Primary\")  Today we documented Decision Maker(s) consistent with Legal Next of Kin hierarchy. Care Preferences:    Hospitalization: \"If your health worsens and it becomes clear that your chance of recovery is unlikely, what would be your preference regarding hospitalization? \"  The patient would prefer hospitalization. Ventilation: \"If you were unable to breath on your own and your chance of recovery was unlikely, what would be your preference about the use of a ventilator (breathing machine) if it was available to you? \"  The patient would desire the use of a ventilator. Resuscitation: \"In the event your heart stopped as a result of an underlying serious health condition, would you want attempts made to restart your heart, or would you prefer a natural death? \"  Yes, attempt to resuscitate.     ventilation preferences, hospitalization preferences and resuscitation preferences    Conversation Outcomes / Follow-Up Plan:  ACP in process - completing/providing documents  Reviewed DNR/DNI and patient elects Full Code (Attempt Resuscitation)    Length of Voluntary ACP Conversation in minutes:  16 minutes    Kelley Mireles

## 2021-04-13 ENCOUNTER — APPOINTMENT (OUTPATIENT)
Dept: MRI IMAGING | Age: 71
DRG: 003 | End: 2021-04-13
Payer: MEDICARE

## 2021-04-13 ENCOUNTER — APPOINTMENT (OUTPATIENT)
Dept: GENERAL RADIOLOGY | Age: 71
DRG: 003 | End: 2021-04-13
Payer: MEDICARE

## 2021-04-13 PROBLEM — I63.9 ACUTE ISCHEMIC STROKE (HCC): Status: RESOLVED | Noted: 2021-04-08 | Resolved: 2021-04-13

## 2021-04-13 LAB
ABO/RH: NORMAL
ABSOLUTE EOS #: 0.12 K/UL (ref 0–0.44)
ABSOLUTE IMMATURE GRANULOCYTE: 0.25 K/UL (ref 0–0.3)
ABSOLUTE LYMPH #: 1.12 K/UL (ref 1.1–3.7)
ABSOLUTE MONO #: 0.93 K/UL (ref 0.1–1.2)
ALBUMIN SERPL-MCNC: 2.2 G/DL (ref 3.5–5.2)
ALBUMIN SERPL-MCNC: 2.4 G/DL (ref 3.5–5.2)
ALBUMIN/GLOBULIN RATIO: 0.8 (ref 1–2.5)
ALBUMIN/GLOBULIN RATIO: 0.9 (ref 1–2.5)
ALLEN TEST: ABNORMAL
ALLEN TEST: NORMAL
ALP BLD-CCNC: 73 U/L (ref 40–129)
ALP BLD-CCNC: 77 U/L (ref 40–129)
ALT SERPL-CCNC: 36 U/L (ref 5–41)
ALT SERPL-CCNC: 37 U/L (ref 5–41)
AMMONIA: 64 UMOL/L (ref 16–60)
ANION GAP SERPL CALCULATED.3IONS-SCNC: 10 MMOL/L (ref 9–17)
ANTIBODY SCREEN: NEGATIVE
ARM BAND NUMBER: NORMAL
AST SERPL-CCNC: 92 U/L
AST SERPL-CCNC: 95 U/L
BASOPHILS # BLD: 1 % (ref 0–2)
BASOPHILS ABSOLUTE: 0.06 K/UL (ref 0–0.2)
BILIRUB SERPL-MCNC: 1.73 MG/DL (ref 0.3–1.2)
BILIRUB SERPL-MCNC: 1.74 MG/DL (ref 0.3–1.2)
BILIRUBIN DIRECT: 0.66 MG/DL
BILIRUBIN, INDIRECT: 1.07 MG/DL (ref 0–1)
BLD PROD TYP BPU: NORMAL
BUN BLDV-MCNC: 32 MG/DL (ref 8–23)
BUN/CREAT BLD: ABNORMAL (ref 9–20)
CALCIUM SERPL-MCNC: 8.6 MG/DL (ref 8.6–10.4)
CHLORIDE BLD-SCNC: 123 MMOL/L (ref 98–107)
CO2: 23 MMOL/L (ref 20–31)
CREAT SERPL-MCNC: 0.9 MG/DL (ref 0.7–1.2)
CROSSMATCH RESULT: NORMAL
DIFFERENTIAL TYPE: ABNORMAL
DISPENSE STATUS BLOOD BANK: NORMAL
EOSINOPHILS RELATIVE PERCENT: 2 % (ref 1–4)
EXPIRATION DATE: NORMAL
FIO2: 40
FIO2: 40
GFR AFRICAN AMERICAN: >60 ML/MIN
GFR NON-AFRICAN AMERICAN: >60 ML/MIN
GFR SERPL CREATININE-BSD FRML MDRD: ABNORMAL ML/MIN/{1.73_M2}
GFR SERPL CREATININE-BSD FRML MDRD: ABNORMAL ML/MIN/{1.73_M2}
GLOBULIN: ABNORMAL G/DL (ref 1.5–3.8)
GLUCOSE BLD-MCNC: 117 MG/DL (ref 75–110)
GLUCOSE BLD-MCNC: 122 MG/DL (ref 75–110)
GLUCOSE BLD-MCNC: 126 MG/DL (ref 75–110)
GLUCOSE BLD-MCNC: 126 MG/DL (ref 75–110)
GLUCOSE BLD-MCNC: 130 MG/DL (ref 74–100)
GLUCOSE BLD-MCNC: 132 MG/DL (ref 70–99)
GLUCOSE BLD-MCNC: 134 MG/DL (ref 75–110)
GLUCOSE BLD-MCNC: 136 MG/DL (ref 75–110)
GLUCOSE BLD-MCNC: 136 MG/DL (ref 75–110)
GLUCOSE BLD-MCNC: 137 MG/DL (ref 75–110)
GLUCOSE BLD-MCNC: 152 MG/DL (ref 75–110)
GLUCOSE BLD-MCNC: 157 MG/DL (ref 75–110)
GLUCOSE BLD-MCNC: 162 MG/DL (ref 75–110)
GLUCOSE BLD-MCNC: 162 MG/DL (ref 75–110)
GLUCOSE BLD-MCNC: 165 MG/DL (ref 75–110)
GLUCOSE BLD-MCNC: 168 MG/DL (ref 75–110)
GLUCOSE BLD-MCNC: 169 MG/DL (ref 75–110)
GLUCOSE BLD-MCNC: 176 MG/DL (ref 75–110)
GLUCOSE BLD-MCNC: 180 MG/DL (ref 75–110)
GLUCOSE BLD-MCNC: 182 MG/DL (ref 75–110)
GLUCOSE BLD-MCNC: 183 MG/DL (ref 75–110)
GLUCOSE BLD-MCNC: 184 MG/DL (ref 75–110)
GLUCOSE BLD-MCNC: 194 MG/DL (ref 75–110)
GLUCOSE BLD-MCNC: 202 MG/DL (ref 74–100)
HCT VFR BLD CALC: 28.1 % (ref 40.7–50.3)
HEMOGLOBIN: 8.6 G/DL (ref 13–17)
IMMATURE GRANULOCYTES: 4 %
INR BLD: 1
LIPASE: 43 U/L (ref 13–60)
LIPASE: 48 U/L (ref 13–60)
LYMPHOCYTES # BLD: 18 % (ref 24–43)
MAGNESIUM: 2.6 MG/DL (ref 1.6–2.6)
MCH RBC QN AUTO: 31.5 PG (ref 25.2–33.5)
MCHC RBC AUTO-ENTMCNC: 30.6 G/DL (ref 28.4–34.8)
MCV RBC AUTO: 102.9 FL (ref 82.6–102.9)
MODE: ABNORMAL
MODE: NORMAL
MONOCYTES # BLD: 15 % (ref 3–12)
MORPHOLOGY: ABNORMAL
NEGATIVE BASE EXCESS, ART: ABNORMAL (ref 0–2)
NEGATIVE BASE EXCESS, ART: NORMAL (ref 0–2)
NRBC AUTOMATED: 2.1 PER 100 WBC
O2 DEVICE/FLOW/%: ABNORMAL
O2 DEVICE/FLOW/%: NORMAL
PATIENT TEMP: 38.7
PATIENT TEMP: ABNORMAL
PDW BLD-RTO: 17.2 % (ref 11.8–14.4)
PHOSPHORUS: 4.5 MG/DL (ref 2.5–4.5)
PLATELET # BLD: 113 K/UL (ref 138–453)
PLATELET ESTIMATE: ABNORMAL
PMV BLD AUTO: 10.6 FL (ref 8.1–13.5)
POC HCO3: 26.3 MMOL/L (ref 21–28)
POC HCO3: 26.4 MMOL/L (ref 21–28)
POC LACTIC ACID: 0.79 MMOL/L (ref 0.56–1.39)
POC LACTIC ACID: 1.1 MMOL/L (ref 0.56–1.39)
POC O2 SATURATION: 98 % (ref 94–98)
POC O2 SATURATION: 99 % (ref 94–98)
POC PCO2 TEMP: 45 MM HG
POC PCO2 TEMP: ABNORMAL MM HG
POC PCO2: 39.9 MM HG (ref 35–48)
POC PCO2: 41.5 MM HG (ref 35–48)
POC PH TEMP: 7.39
POC PH TEMP: ABNORMAL
POC PH: 7.41 (ref 7.35–7.45)
POC PH: 7.43 (ref 7.35–7.45)
POC PO2 TEMP: 109 MM HG
POC PO2 TEMP: ABNORMAL MM HG
POC PO2: 115.9 MM HG (ref 83–108)
POC PO2: 98.1 MM HG (ref 83–108)
POSITIVE BASE EXCESS, ART: 2 (ref 0–3)
POSITIVE BASE EXCESS, ART: 2 (ref 0–3)
POTASSIUM SERPL-SCNC: 4.2 MMOL/L (ref 3.7–5.3)
PROCALCITONIN: 0.32 NG/ML
PROTHROMBIN TIME: 10.7 SEC (ref 9.1–12.3)
RBC # BLD: 2.73 M/UL (ref 4.21–5.77)
RBC # BLD: ABNORMAL 10*6/UL
SAMPLE SITE: ABNORMAL
SAMPLE SITE: NORMAL
SEG NEUTROPHILS: 60 % (ref 36–65)
SEGMENTED NEUTROPHILS ABSOLUTE COUNT: 3.72 K/UL (ref 1.5–8.1)
SODIUM BLD-SCNC: 156 MMOL/L (ref 135–144)
TCO2 (CALC), ART: 28 MMOL/L (ref 22–29)
TCO2 (CALC), ART: 28 MMOL/L (ref 22–29)
TOTAL PROTEIN: 5 G/DL (ref 6.4–8.3)
TOTAL PROTEIN: 5 G/DL (ref 6.4–8.3)
TRANSFUSION STATUS: NORMAL
UNIT DIVISION: 0
UNIT NUMBER: NORMAL
WBC # BLD: 6.2 K/UL (ref 3.5–11.3)
WBC # BLD: ABNORMAL 10*3/UL

## 2021-04-13 PROCEDURE — 83690 ASSAY OF LIPASE: CPT

## 2021-04-13 PROCEDURE — 6370000000 HC RX 637 (ALT 250 FOR IP): Performed by: STUDENT IN AN ORGANIZED HEALTH CARE EDUCATION/TRAINING PROGRAM

## 2021-04-13 PROCEDURE — 6360000002 HC RX W HCPCS

## 2021-04-13 PROCEDURE — 80076 HEPATIC FUNCTION PANEL: CPT

## 2021-04-13 PROCEDURE — 71045 X-RAY EXAM CHEST 1 VIEW: CPT

## 2021-04-13 PROCEDURE — 94003 VENT MGMT INPAT SUBQ DAY: CPT

## 2021-04-13 PROCEDURE — 70551 MRI BRAIN STEM W/O DYE: CPT

## 2021-04-13 PROCEDURE — 6370000000 HC RX 637 (ALT 250 FOR IP): Performed by: NURSE PRACTITIONER

## 2021-04-13 PROCEDURE — 6370000000 HC RX 637 (ALT 250 FOR IP): Performed by: ORTHOPAEDIC SURGERY

## 2021-04-13 PROCEDURE — 85610 PROTHROMBIN TIME: CPT

## 2021-04-13 PROCEDURE — 82803 BLOOD GASES ANY COMBINATION: CPT

## 2021-04-13 PROCEDURE — 83605 ASSAY OF LACTIC ACID: CPT

## 2021-04-13 PROCEDURE — APPSS30 APP SPLIT SHARED TIME 16-30 MINUTES: Performed by: PHYSICIAN ASSISTANT

## 2021-04-13 PROCEDURE — 93970 EXTREMITY STUDY: CPT

## 2021-04-13 PROCEDURE — 82947 ASSAY GLUCOSE BLOOD QUANT: CPT

## 2021-04-13 PROCEDURE — 2700000000 HC OXYGEN THERAPY PER DAY

## 2021-04-13 PROCEDURE — 99233 SBSQ HOSP IP/OBS HIGH 50: CPT | Performed by: FAMILY MEDICINE

## 2021-04-13 PROCEDURE — 2000000000 HC ICU R&B

## 2021-04-13 PROCEDURE — 85025 COMPLETE CBC W/AUTO DIFF WBC: CPT

## 2021-04-13 PROCEDURE — 2580000003 HC RX 258: Performed by: ORTHOPAEDIC SURGERY

## 2021-04-13 PROCEDURE — 94761 N-INVAS EAR/PLS OXIMETRY MLT: CPT

## 2021-04-13 PROCEDURE — 80053 COMPREHEN METABOLIC PANEL: CPT

## 2021-04-13 PROCEDURE — 2500000003 HC RX 250 WO HCPCS: Performed by: STUDENT IN AN ORGANIZED HEALTH CARE EDUCATION/TRAINING PROGRAM

## 2021-04-13 PROCEDURE — 72141 MRI NECK SPINE W/O DYE: CPT

## 2021-04-13 PROCEDURE — 82140 ASSAY OF AMMONIA: CPT

## 2021-04-13 PROCEDURE — 83735 ASSAY OF MAGNESIUM: CPT

## 2021-04-13 PROCEDURE — 94640 AIRWAY INHALATION TREATMENT: CPT

## 2021-04-13 PROCEDURE — 6360000002 HC RX W HCPCS: Performed by: STUDENT IN AN ORGANIZED HEALTH CARE EDUCATION/TRAINING PROGRAM

## 2021-04-13 PROCEDURE — 37799 UNLISTED PX VASCULAR SURGERY: CPT

## 2021-04-13 PROCEDURE — 2580000003 HC RX 258: Performed by: STUDENT IN AN ORGANIZED HEALTH CARE EDUCATION/TRAINING PROGRAM

## 2021-04-13 PROCEDURE — 84100 ASSAY OF PHOSPHORUS: CPT

## 2021-04-13 RX ORDER — SODIUM CHLORIDE, SODIUM LACTATE, POTASSIUM CHLORIDE, AND CALCIUM CHLORIDE .6; .31; .03; .02 G/100ML; G/100ML; G/100ML; G/100ML
250 INJECTION, SOLUTION INTRAVENOUS ONCE
Status: COMPLETED | OUTPATIENT
Start: 2021-04-13 | End: 2021-04-13

## 2021-04-13 RX ORDER — FENTANYL CITRATE 50 UG/ML
50 INJECTION, SOLUTION INTRAMUSCULAR; INTRAVENOUS ONCE
Status: COMPLETED | OUTPATIENT
Start: 2021-04-13 | End: 2021-04-13

## 2021-04-13 RX ORDER — MIDAZOLAM HYDROCHLORIDE 2 MG/2ML
2 INJECTION, SOLUTION INTRAMUSCULAR; INTRAVENOUS ONCE
Status: COMPLETED | OUTPATIENT
Start: 2021-04-13 | End: 2021-04-13

## 2021-04-13 RX ORDER — IBUPROFEN 400 MG/1
400 TABLET ORAL
Status: DISCONTINUED | OUTPATIENT
Start: 2021-04-13 | End: 2021-04-13

## 2021-04-13 RX ORDER — FENTANYL CITRATE 50 UG/ML
INJECTION, SOLUTION INTRAMUSCULAR; INTRAVENOUS
Status: COMPLETED
Start: 2021-04-13 | End: 2021-04-13

## 2021-04-13 RX ORDER — LANSOPRAZOLE
30 KIT 2 TIMES DAILY
Status: DISCONTINUED | OUTPATIENT
Start: 2021-04-13 | End: 2021-04-17 | Stop reason: HOSPADM

## 2021-04-13 RX ADMIN — FENTANYL CITRATE 50 MCG: 50 INJECTION, SOLUTION INTRAMUSCULAR; INTRAVENOUS at 15:24

## 2021-04-13 RX ADMIN — IBUPROFEN 400 MG: 800 TABLET, FILM COATED ORAL at 05:04

## 2021-04-13 RX ADMIN — IBUPROFEN 400 MG: 200 SUSPENSION ORAL at 21:12

## 2021-04-13 RX ADMIN — Medication 30 MG: at 21:12

## 2021-04-13 RX ADMIN — LACTULOSE 20 G: 10 SOLUTION ORAL at 14:45

## 2021-04-13 RX ADMIN — MIDAZOLAM HYDROCHLORIDE 2 MG: 1 INJECTION, SOLUTION INTRAMUSCULAR; INTRAVENOUS at 13:42

## 2021-04-13 RX ADMIN — IPRATROPIUM BROMIDE AND ALBUTEROL SULFATE 1 AMPULE: .5; 3 SOLUTION RESPIRATORY (INHALATION) at 12:10

## 2021-04-13 RX ADMIN — BACITRACIN: 500 OINTMENT TOPICAL at 08:58

## 2021-04-13 RX ADMIN — IBUPROFEN 400 MG: 200 SUSPENSION ORAL at 16:38

## 2021-04-13 RX ADMIN — IPRATROPIUM BROMIDE AND ALBUTEROL SULFATE 1 AMPULE: .5; 3 SOLUTION RESPIRATORY (INHALATION) at 16:10

## 2021-04-13 RX ADMIN — ACETAMINOPHEN 1000 MG: 650 SOLUTION ORAL at 16:38

## 2021-04-13 RX ADMIN — SODIUM CHLORIDE, PRESERVATIVE FREE 10 ML: 5 INJECTION INTRAVENOUS at 21:13

## 2021-04-13 RX ADMIN — LACTULOSE 20 G: 10 SOLUTION ORAL at 21:12

## 2021-04-13 RX ADMIN — LACTULOSE 20 G: 10 SOLUTION ORAL at 08:55

## 2021-04-13 RX ADMIN — IPRATROPIUM BROMIDE AND ALBUTEROL SULFATE 1 AMPULE: .5; 3 SOLUTION RESPIRATORY (INHALATION) at 08:51

## 2021-04-13 RX ADMIN — IPRATROPIUM BROMIDE AND ALBUTEROL SULFATE 1 AMPULE: .5; 3 SOLUTION RESPIRATORY (INHALATION) at 19:58

## 2021-04-13 RX ADMIN — BACITRACIN: 500 OINTMENT TOPICAL at 21:13

## 2021-04-13 RX ADMIN — ENOXAPARIN SODIUM 105 MG: 120 INJECTION SUBCUTANEOUS at 21:12

## 2021-04-13 RX ADMIN — LEVETIRACETAM 500 MG: 100 SOLUTION ORAL at 08:58

## 2021-04-13 RX ADMIN — SPIRONOLACTONE 50 MG: 25 TABLET ORAL at 08:57

## 2021-04-13 RX ADMIN — CHLORHEXIDINE GLUCONATE 0.12% ORAL RINSE 15 ML: 1.2 LIQUID ORAL at 08:57

## 2021-04-13 RX ADMIN — LEVETIRACETAM 500 MG: 100 SOLUTION ORAL at 21:12

## 2021-04-13 RX ADMIN — IPRATROPIUM BROMIDE AND ALBUTEROL SULFATE 1 AMPULE: .5; 3 SOLUTION RESPIRATORY (INHALATION) at 03:11

## 2021-04-13 RX ADMIN — ACETAMINOPHEN 1000 MG: 650 SOLUTION ORAL at 08:58

## 2021-04-13 RX ADMIN — ACETAMINOPHEN 1000 MG: 650 SOLUTION ORAL at 01:17

## 2021-04-13 RX ADMIN — SODIUM CHLORIDE, POTASSIUM CHLORIDE, SODIUM LACTATE AND CALCIUM CHLORIDE 250 ML: 600; 310; 30; 20 INJECTION, SOLUTION INTRAVENOUS at 11:47

## 2021-04-13 RX ADMIN — Medication 2 MCG/MIN: at 00:05

## 2021-04-13 RX ADMIN — IBUPROFEN 400 MG: 200 SUSPENSION ORAL at 12:36

## 2021-04-13 RX ADMIN — IPRATROPIUM BROMIDE AND ALBUTEROL SULFATE 1 AMPULE: .5; 3 SOLUTION RESPIRATORY (INHALATION) at 23:07

## 2021-04-13 ASSESSMENT — PULMONARY FUNCTION TESTS
PIF_VALUE: 21
PIF_VALUE: 30
PIF_VALUE: 27

## 2021-04-13 NOTE — PLAN OF CARE
Problem: OXYGENATION/RESPIRATORY FUNCTION  Goal: Patient will maintain patent airway  4/12/2021 2155 by Dalila Lynn, RCP  Outcome: Ongoing  Goal: Patient will achieve/maintain normal respiratory rate/effort  Description: Respiratory rate and effort will be within normal limits for the patient  4/12/2021 2155 by Dalila Lynn, RCP  Outcome: Ongoing     Problem: MECHANICAL VENTILATION  Goal: Patient will maintain patent airway  4/12/2021 2155 by Dailla Lynn, RCP  Outcome: Ongoing  Goal: Oral health is maintained or improved  4/12/2021 2155 by Dalila Lynn, RCP  Outcome: Ongoing  Goal: ET tube will be managed safely  4/12/2021 2155 by Dalila Lynn, RCP  Outcome: Ongoing  Goal: Ability to express needs and understand communication  4/12/2021 2155 by Dalila Lynn, RCP  Outcome: Ongoing  Goal: Mobility/activity is maintained at optimum level for patient  4/12/2021 2155 by Dalila Lynn, RCP  Outcome: Ongoing     Problem: SKIN INTEGRITY  Goal: Skin integrity is maintained or improved  4/12/2021 2155 by Dalila Lynn, RCP  Outcome: Ongoing

## 2021-04-13 NOTE — PLAN OF CARE
Problem: OXYGENATION/RESPIRATORY FUNCTION  Goal: Patient will maintain patent airway  4/12/2021 2155 by Arash Curiel RCP  Outcome: Ongoing     Problem: OXYGENATION/RESPIRATORY FUNCTION  Goal: Patient will achieve/maintain normal respiratory rate/effort  Description: Respiratory rate and effort will be within normal limits for the patient  4/12/2021 2155 by Arash Curiel RCP  Outcome: Ongoing     Problem: MECHANICAL VENTILATION  Goal: Patient will maintain patent airway  4/12/2021 2155 by Arash Curiel RCP  Outcome: Ongoing     Problem: MECHANICAL VENTILATION  Goal: Oral health is maintained or improved  4/12/2021 2155 by Arash Curiel RCP  Outcome: Ongoing     Problem: MECHANICAL VENTILATION  Goal: ET tube will be managed safely  4/12/2021 2155 by Arash Curiel RCP  Outcome: Ongoing     Problem: MECHANICAL VENTILATION  Goal: Ability to express needs and understand communication  4/12/2021 2155 by Arash Curiel RCP  Outcome: Ongoing     Problem: MECHANICAL VENTILATION  Goal: Mobility/activity is maintained at optimum level for patient  4/12/2021 2155 by Arash Curiel RCP  Outcome: Ongoing     Problem: SKIN INTEGRITY  Goal: Skin integrity is maintained or improved  4/12/2021 2155 by Arash Curiel RCP  Outcome: Ongoing

## 2021-04-13 NOTE — PLAN OF CARE
Altered:  Goal: Absence of psychomotor disturbance signs and symptoms  4/13/2021 1359 by Lester Branham RN  Outcome: Ongoing  4/13/2021 0625 by Bridgette Stahl RN  Outcome: Ongoing     Problem: Sensory Perception - Impaired:  Goal: Demonstrations of improved sensory functioning will increase  Outcome: Ongoing  Goal: Decrease in sensory misperception frequency  Outcome: Ongoing  Goal: Able to refrain from responding to false sensory perceptions  Outcome: Ongoing  Goal: Demonstrates accurate environmental perceptions  Outcome: Ongoing  Goal: Able to distinguish between reality-based and nonreality-based thinking  Outcome: Ongoing  Goal: Able to interrupt nonreality-based thinking  Outcome: Ongoing     Problem: Sleep Pattern Disturbance:  Goal: Appears well-rested  Outcome: Ongoing     Problem: Skin Integrity:  Goal: Will show no infection signs and symptoms  Outcome: Ongoing  Goal: Absence of new skin breakdown  Outcome: Ongoing     Problem: Infection - Surgical Site:  Goal: Will show no infection signs and symptoms  Outcome: Ongoing     Problem: Injury - Risk of, Postfracture Complications:  Goal: Absence of fat embolism  Outcome: Ongoing  Goal: Absence of compartment syndrome signs and symptoms  Outcome: Ongoing     Problem: Mobility - Impaired:  Goal: Mobility will improve to maximum level  Outcome: Ongoing     Problem: Pain - Acute:  Goal: Pain level will decrease  Outcome: Ongoing     Problem: Venous Thromboembolism:  Goal: Absence of deep vein thrombosis  Outcome: Ongoing  Goal: Absence of signs or symptoms of impaired coagulation  Outcome: Ongoing  Goal: Will show no signs or symptoms of venous thromboembolism  Outcome: Ongoing     Problem: Nutrition  Goal: Optimal nutrition therapy  Outcome: Ongoing

## 2021-04-13 NOTE — PROGRESS NOTES
Neurosurgery KORINA/Resident    Daily Progress Note   No chief complaint on file. 4/13/2021  10:54 AM    Chart reviewed. No acute events overnight. No new complaints. Temp 101.5 overnight. Vitals:    04/13/21 0945 04/13/21 1000 04/13/21 1015 04/13/21 1030   BP:  (!) 185/54     Pulse: 66 65 60 59   Resp: 19 21 18 18   Temp:  101.5 °F (38.6 °C)     TempSrc:  Bladder     SpO2: 98% 98% 97% 97%   Weight:       Height:         PE: Intubated, sedation held for exam  E1 V1T M5  PERRL  Localizes with LUE  Withdraws BLE L>R     [place]resham site mandeep intact, area clean and dry      Lab Results   Component Value Date    WBC 6.2 04/13/2021    HGB 8.6 (L) 04/13/2021    HCT 28.1 (L) 04/13/2021     (L) 04/13/2021    TRIG 128 04/10/2021    ALT 37 04/13/2021    AST 92 (H) 04/13/2021     (H) 04/13/2021    K 4.2 04/13/2021     (H) 04/13/2021    CREATININE 0.90 04/13/2021    BUN 32 (H) 04/13/2021    CO2 23 04/13/2021    INR 1.0 04/13/2021    LABA1C 6.3 (H) 04/12/2021         A/P  66 y. o. male who presents with multiple small scattered intraparenchymal hemorrahge, subarachnoid hemorrahge  left parietal depressed skull fracture  POD 4 s/p [place] placement for ICP monitoring      - [place] site staple intact, no drainage  - Will follow up on MRI brain and cervical results   - Ok for therapeutic lovenox for LE DVT, repeat CTH after treating for 24hrs   - EEG  - q4h neuro checks    Please contact neurosurgery with any changes in patients neurologic status.          PEYTON Davis   10:54 AM EDT

## 2021-04-13 NOTE — PLAN OF CARE
Problem: Falls - Risk of:  Goal: Will remain free from falls  Description: Will remain free from falls  Outcome: Ongoing     Problem: Injury - Risk of, Physical Injury:  Goal: Will remain free from falls  Description: Will remain free from falls  Outcome: Ongoing     Problem: Mood - Altered:  Goal: Mood stable  Description: Mood stable  Outcome: Ongoing     Problem: Psychomotor Activity - Altered:  Goal: Absence of psychomotor disturbance signs and symptoms  Description: Absence of psychomotor disturbance signs and symptoms  Outcome: Ongoing

## 2021-04-13 NOTE — PROGRESS NOTES
Spoke with Dr Sterling Rivas about patients condition. [place] not being done today due to increased peep. Patient also on levophed and is febrile. Dr Sterling Rivas will do G tube when patient is more stable. Bretjaz galeana RN notified.
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
## 2021-04-13 NOTE — PROGRESS NOTES
Physical Therapy    DATE: 2021    NAME: Lucille Ayala  MRN: [id number]   : 1950      Patient not seen this date for Physical Therapy due to: Other: pt intubated, awaiting further MRI imaging and dopplers to r/o DVT. PT not appropriate for PT evaluation at this time. PT will check back 21.        Electronically signed by Kike Mayo PT on 2021 at 11:08 AM

## 2021-04-13 NOTE — PROGRESS NOTES
PROCEDURE NOTE - CENTRAL VENOUS LINE PLACEMENT    PATIENT NAME: Snehal [facility] RECORD NO. [id number]  DATE: 4/13/2021  ATTENDING PHYSICIAN: Maru Polo    PREOPERATIVE DIAGNOSIS:  centrally administered medications  POSTOPERATIVE DIAGNOSIS:  Same  PROCEDURE PERFORMED:  Right Internal Jugular Vein Central Line Insertion  PERFORMING PHYSICIAN: Primitivo Vo MD  ANESTHESIA:  Local utilizing 1% lidocaine  ESTIMATED BLOOD LOSS:  Less than 25 ml  COMPLICATIONS:  None immediately appreciated. DISCUSSION:  Lucille Ayala is a 66y.o.-year-old male who requires central IV access centrally administered medications. The history and physical examination were reviewed and confirmed. CONSENT: Unable to be obtained due to patient's condition. PROCEDURE:  A timeout was initiated by the bedside nurse and was confirmed by those present. The patient was placed in a supine position. The skin overlying the Right Internal Jugular Vein was prepped with chlorhexadine and draped in sterile fashion. The skin was infiltrated with local anesthetic. The vessel and surrounding anatomy was visualized using ultrasound. Through the anesthetized region, the introducer needle was inserted into the internal jugular vein returning dark red non pulsatile blood. A guidewire was placed through the center of the needle with no resistance. Ultrasound confirmed presence of wire in the vein. A small incision made in the skin with a #11 scalpel blade. The dilator was inserted into the skin and vein over guidewire using Seldinger technique. The dilator was then removed and the 7 F 20cm catheter was placed in the vein over the guidewire using Seldinger technique. The guidewire was then removed and all ports aspirated and flushed appropriately. The catheter then secured using silk suture and a temporary sterile dressing was applied. No immediate complication was evident.   All sponge, instrument and needle counts were correct at the completion of the procedure. Postprocedural chest x-ray showed good position of the catheter with no evidence of hemothorax or pneumothorax. The patient tolerated the procedure well with no immediate complication evident.      Katy English MD  3:45 PM, 4/13/21
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
## 2021-04-14 ENCOUNTER — ANESTHESIA EVENT (OUTPATIENT)
Dept: OPERATING ROOM | Age: 71
DRG: 003 | End: 2021-04-14
Payer: MEDICARE

## 2021-04-14 ENCOUNTER — ANESTHESIA (OUTPATIENT)
Dept: OPERATING ROOM | Age: 71
DRG: 003 | End: 2021-04-14
Payer: MEDICARE

## 2021-04-14 ENCOUNTER — APPOINTMENT (OUTPATIENT)
Dept: GENERAL RADIOLOGY | Age: 71
DRG: 003 | End: 2021-04-14
Payer: MEDICARE

## 2021-04-14 VITALS
RESPIRATION RATE: 16 BRPM | OXYGEN SATURATION: 100 % | DIASTOLIC BLOOD PRESSURE: 56 MMHG | SYSTOLIC BLOOD PRESSURE: 123 MMHG | TEMPERATURE: 82.8 F

## 2021-04-14 LAB
ABSOLUTE EOS #: 0.17 K/UL (ref 0–0.44)
ABSOLUTE IMMATURE GRANULOCYTE: 0.09 K/UL (ref 0–0.3)
ABSOLUTE LYMPH #: 0.76 K/UL (ref 1.1–3.7)
ABSOLUTE MONO #: 0.61 K/UL (ref 0.1–1.2)
ALBUMIN SERPL-MCNC: 2.3 G/DL (ref 3.5–5.2)
ALBUMIN/GLOBULIN RATIO: 0.9 (ref 1–2.5)
ALLEN TEST: NORMAL
ALP BLD-CCNC: 70 U/L (ref 40–129)
ALT SERPL-CCNC: 40 U/L (ref 5–41)
AMMONIA: 49 UMOL/L (ref 16–60)
ANION GAP SERPL CALCULATED.3IONS-SCNC: 4 MMOL/L (ref 9–17)
AST SERPL-CCNC: 98 U/L
BASOPHILS # BLD: 1 % (ref 0–2)
BASOPHILS ABSOLUTE: <0.03 K/UL (ref 0–0.2)
BILIRUB SERPL-MCNC: 1.69 MG/DL (ref 0.3–1.2)
BUN BLDV-MCNC: 42 MG/DL (ref 8–23)
BUN/CREAT BLD: ABNORMAL (ref 9–20)
CALCIUM SERPL-MCNC: 8.6 MG/DL (ref 8.6–10.4)
CHLORIDE BLD-SCNC: 120 MMOL/L (ref 98–107)
CO2: 28 MMOL/L (ref 20–31)
CREAT SERPL-MCNC: 0.88 MG/DL (ref 0.7–1.2)
DIFFERENTIAL TYPE: ABNORMAL
EOSINOPHILS RELATIVE PERCENT: 4 % (ref 1–4)
FIO2: 40
GFR AFRICAN AMERICAN: >60 ML/MIN
GFR NON-AFRICAN AMERICAN: >60 ML/MIN
GFR SERPL CREATININE-BSD FRML MDRD: ABNORMAL ML/MIN/{1.73_M2}
GFR SERPL CREATININE-BSD FRML MDRD: ABNORMAL ML/MIN/{1.73_M2}
GLUCOSE BLD-MCNC: 116 MG/DL (ref 75–110)
GLUCOSE BLD-MCNC: 125 MG/DL (ref 75–110)
GLUCOSE BLD-MCNC: 127 MG/DL (ref 75–110)
GLUCOSE BLD-MCNC: 133 MG/DL (ref 75–110)
GLUCOSE BLD-MCNC: 135 MG/DL (ref 75–110)
GLUCOSE BLD-MCNC: 136 MG/DL (ref 75–110)
GLUCOSE BLD-MCNC: 139 MG/DL (ref 70–99)
GLUCOSE BLD-MCNC: 141 MG/DL (ref 75–110)
GLUCOSE BLD-MCNC: 144 MG/DL (ref 75–110)
GLUCOSE BLD-MCNC: 150 MG/DL (ref 74–100)
GLUCOSE BLD-MCNC: 161 MG/DL (ref 75–110)
GLUCOSE BLD-MCNC: 173 MG/DL (ref 75–110)
GLUCOSE BLD-MCNC: 176 MG/DL (ref 75–110)
GLUCOSE BLD-MCNC: 186 MG/DL (ref 75–110)
GLUCOSE BLD-MCNC: 207 MG/DL (ref 75–110)
HCT VFR BLD CALC: 27.1 % (ref 40.7–50.3)
HEMOGLOBIN: 8.1 G/DL (ref 13–17)
IMMATURE GRANULOCYTES: 2 %
INR BLD: 1
LIPASE: 36 U/L (ref 13–60)
LYMPHOCYTES # BLD: 18 % (ref 24–43)
MAGNESIUM: 2.7 MG/DL (ref 1.6–2.6)
MCH RBC QN AUTO: 31.5 PG (ref 25.2–33.5)
MCHC RBC AUTO-ENTMCNC: 29.9 G/DL (ref 28.4–34.8)
MCV RBC AUTO: 105.4 FL (ref 82.6–102.9)
MODE: NORMAL
MONOCYTES # BLD: 14 % (ref 3–12)
NEGATIVE BASE EXCESS, ART: NORMAL (ref 0–2)
NRBC AUTOMATED: 0.9 PER 100 WBC
O2 DEVICE/FLOW/%: NORMAL
PARTIAL THROMBOPLASTIN TIME: 21.4 SEC (ref 20.5–30.5)
PATIENT TEMP: 37.9
PDW BLD-RTO: 17.1 % (ref 11.8–14.4)
PHOSPHORUS: 3.5 MG/DL (ref 2.5–4.5)
PLATELET # BLD: 89 K/UL (ref 138–453)
PLATELET ESTIMATE: ABNORMAL
PMV BLD AUTO: 10.6 FL (ref 8.1–13.5)
POC HCO3: 26.6 MMOL/L (ref 21–28)
POC LACTIC ACID: 0.84 MMOL/L (ref 0.56–1.39)
POC O2 SATURATION: 97 % (ref 94–98)
POC PCO2 TEMP: 41 MM HG
POC PCO2: 39.8 MM HG (ref 35–48)
POC PH TEMP: 7.42
POC PH: 7.43 (ref 7.35–7.45)
POC PO2 TEMP: 97 MM HG
POC PO2: 91.8 MM HG (ref 83–108)
POSITIVE BASE EXCESS, ART: 2 (ref 0–3)
POTASSIUM SERPL-SCNC: 4.1 MMOL/L (ref 3.7–5.3)
PROCALCITONIN: 0.23 NG/ML
PROTHROMBIN TIME: 11.1 SEC (ref 9.1–12.3)
RBC # BLD: 2.57 M/UL (ref 4.21–5.77)
RBC # BLD: ABNORMAL 10*6/UL
SAMPLE SITE: NORMAL
SEG NEUTROPHILS: 61 % (ref 36–65)
SEGMENTED NEUTROPHILS ABSOLUTE COUNT: 2.65 K/UL (ref 1.5–8.1)
SODIUM BLD-SCNC: 152 MMOL/L (ref 135–144)
TCO2 (CALC), ART: 28 MMOL/L (ref 22–29)
TOTAL PROTEIN: 5 G/DL (ref 6.4–8.3)
WBC # BLD: 4.3 K/UL (ref 3.5–11.3)
WBC # BLD: ABNORMAL 10*3/UL

## 2021-04-14 PROCEDURE — 6370000000 HC RX 637 (ALT 250 FOR IP): Performed by: SURGERY

## 2021-04-14 PROCEDURE — 6370000000 HC RX 637 (ALT 250 FOR IP): Performed by: STUDENT IN AN ORGANIZED HEALTH CARE EDUCATION/TRAINING PROGRAM

## 2021-04-14 PROCEDURE — 82803 BLOOD GASES ANY COMBINATION: CPT

## 2021-04-14 PROCEDURE — 2000000000 HC ICU R&B

## 2021-04-14 PROCEDURE — 2700000000 HC OXYGEN THERAPY PER DAY

## 2021-04-14 PROCEDURE — 6370000000 HC RX 637 (ALT 250 FOR IP): Performed by: NURSE PRACTITIONER

## 2021-04-14 PROCEDURE — 71045 X-RAY EXAM CHEST 1 VIEW: CPT

## 2021-04-14 PROCEDURE — 6370000000 HC RX 637 (ALT 250 FOR IP): Performed by: ORTHOPAEDIC SURGERY

## 2021-04-14 PROCEDURE — 82140 ASSAY OF AMMONIA: CPT

## 2021-04-14 PROCEDURE — 95711 VEEG 2-12 HR UNMONITORED: CPT

## 2021-04-14 PROCEDURE — 2709999900 HC NON-CHARGEABLE SUPPLY: Performed by: SURGERY

## 2021-04-14 PROCEDURE — 84145 PROCALCITONIN (PCT): CPT

## 2021-04-14 PROCEDURE — 83605 ASSAY OF LACTIC ACID: CPT

## 2021-04-14 PROCEDURE — 2580000003 HC RX 258: Performed by: STUDENT IN AN ORGANIZED HEALTH CARE EDUCATION/TRAINING PROGRAM

## 2021-04-14 PROCEDURE — 6360000002 HC RX W HCPCS: Performed by: STUDENT IN AN ORGANIZED HEALTH CARE EDUCATION/TRAINING PROGRAM

## 2021-04-14 PROCEDURE — 85025 COMPLETE CBC W/AUTO DIFF WBC: CPT

## 2021-04-14 PROCEDURE — 3700000000 HC ANESTHESIA ATTENDED CARE: Performed by: SURGERY

## 2021-04-14 PROCEDURE — 80053 COMPREHEN METABOLIC PANEL: CPT

## 2021-04-14 PROCEDURE — 2500000003 HC RX 250 WO HCPCS: Performed by: NURSE ANESTHETIST, CERTIFIED REGISTERED

## 2021-04-14 PROCEDURE — 2580000003 HC RX 258: Performed by: ORTHOPAEDIC SURGERY

## 2021-04-14 PROCEDURE — 85730 THROMBOPLASTIN TIME PARTIAL: CPT

## 2021-04-14 PROCEDURE — 2580000003 HC RX 258: Performed by: NURSE PRACTITIONER

## 2021-04-14 PROCEDURE — 94003 VENT MGMT INPAT SUBQ DAY: CPT

## 2021-04-14 PROCEDURE — 74018 RADEX ABDOMEN 1 VIEW: CPT

## 2021-04-14 PROCEDURE — 94761 N-INVAS EAR/PLS OXIMETRY MLT: CPT

## 2021-04-14 PROCEDURE — 83690 ASSAY OF LIPASE: CPT

## 2021-04-14 PROCEDURE — 85610 PROTHROMBIN TIME: CPT

## 2021-04-14 PROCEDURE — 3700000001 HC ADD 15 MINUTES (ANESTHESIA): Performed by: SURGERY

## 2021-04-14 PROCEDURE — 83735 ASSAY OF MAGNESIUM: CPT

## 2021-04-14 PROCEDURE — 37799 UNLISTED PX VASCULAR SURGERY: CPT

## 2021-04-14 PROCEDURE — 3600000030 HC TRACHEOSTOMY: Performed by: SURGERY

## 2021-04-14 PROCEDURE — 2580000003 HC RX 258: Performed by: SURGERY

## 2021-04-14 PROCEDURE — 95700 EEG CONT REC W/VID EEG TECH: CPT

## 2021-04-14 PROCEDURE — 94640 AIRWAY INHALATION TREATMENT: CPT

## 2021-04-14 PROCEDURE — 84100 ASSAY OF PHOSPHORUS: CPT

## 2021-04-14 PROCEDURE — 6360000002 HC RX W HCPCS: Performed by: NURSE ANESTHETIST, CERTIFIED REGISTERED

## 2021-04-14 PROCEDURE — 0B110F4 BYPASS TRACHEA TO CUTANEOUS WITH TRACHEOSTOMY DEVICE, OPEN APPROACH: ICD-10-PCS | Performed by: SURGERY

## 2021-04-14 PROCEDURE — APPSS15 APP SPLIT SHARED TIME 0-15 MINUTES: Performed by: NURSE PRACTITIONER

## 2021-04-14 RX ORDER — HYDRALAZINE HYDROCHLORIDE 20 MG/ML
10 INJECTION INTRAMUSCULAR; INTRAVENOUS EVERY 6 HOURS PRN
Status: DISCONTINUED | OUTPATIENT
Start: 2021-04-14 | End: 2021-04-15

## 2021-04-14 RX ORDER — IPRATROPIUM BROMIDE AND ALBUTEROL SULFATE 2.5; .5 MG/3ML; MG/3ML
1 SOLUTION RESPIRATORY (INHALATION) EVERY 6 HOURS PRN
Status: DISCONTINUED | OUTPATIENT
Start: 2021-04-14 | End: 2021-04-17 | Stop reason: HOSPADM

## 2021-04-14 RX ORDER — HEPARIN SODIUM 10000 [USP'U]/100ML
5-30 INJECTION, SOLUTION INTRAVENOUS CONTINUOUS
Status: DISCONTINUED | OUTPATIENT
Start: 2021-04-14 | End: 2021-04-16

## 2021-04-14 RX ORDER — ROCURONIUM BROMIDE 10 MG/ML
INJECTION, SOLUTION INTRAVENOUS PRN
Status: DISCONTINUED | OUTPATIENT
Start: 2021-04-14 | End: 2021-04-14 | Stop reason: SDUPTHER

## 2021-04-14 RX ORDER — MAGNESIUM HYDROXIDE 1200 MG/15ML
LIQUID ORAL CONTINUOUS PRN
Status: COMPLETED | OUTPATIENT
Start: 2021-04-14 | End: 2021-04-14

## 2021-04-14 RX ORDER — SODIUM CHLORIDE 9 MG/ML
INJECTION, SOLUTION INTRAVENOUS CONTINUOUS
Status: DISCONTINUED | OUTPATIENT
Start: 2021-04-14 | End: 2021-04-14

## 2021-04-14 RX ORDER — FENTANYL CITRATE 50 UG/ML
INJECTION, SOLUTION INTRAMUSCULAR; INTRAVENOUS PRN
Status: DISCONTINUED | OUTPATIENT
Start: 2021-04-14 | End: 2021-04-14 | Stop reason: SDUPTHER

## 2021-04-14 RX ORDER — SODIUM CHLORIDE, SODIUM LACTATE, POTASSIUM CHLORIDE, CALCIUM CHLORIDE 600; 310; 30; 20 MG/100ML; MG/100ML; MG/100ML; MG/100ML
INJECTION, SOLUTION INTRAVENOUS CONTINUOUS
Status: DISCONTINUED | OUTPATIENT
Start: 2021-04-14 | End: 2021-04-16

## 2021-04-14 RX ORDER — HEPARIN SODIUM 10000 [USP'U]/100ML
5-30 INJECTION, SOLUTION INTRAVENOUS CONTINUOUS
Status: DISCONTINUED | OUTPATIENT
Start: 2021-04-14 | End: 2021-04-14

## 2021-04-14 RX ORDER — ULTRASOUND COUPLING MEDIUM
GEL (GRAM) TOPICAL PRN
Status: DISCONTINUED | OUTPATIENT
Start: 2021-04-14 | End: 2021-04-14 | Stop reason: ALTCHOICE

## 2021-04-14 RX ADMIN — ROCURONIUM BROMIDE 50 MG: 10 INJECTION INTRAVENOUS at 11:29

## 2021-04-14 RX ADMIN — HEPARIN SODIUM AND DEXTROSE 9 UNITS/KG/HR: 10000; 5 INJECTION INTRAVENOUS at 18:45

## 2021-04-14 RX ADMIN — IBUPROFEN 400 MG: 200 SUSPENSION ORAL at 02:44

## 2021-04-14 RX ADMIN — ACETAMINOPHEN 1000 MG: 650 SOLUTION ORAL at 02:44

## 2021-04-14 RX ADMIN — ROCURONIUM BROMIDE 50 MG: 10 INJECTION INTRAVENOUS at 11:39

## 2021-04-14 RX ADMIN — Medication 30 MG: at 20:19

## 2021-04-14 RX ADMIN — LACTULOSE 20 G: 10 SOLUTION ORAL at 09:04

## 2021-04-14 RX ADMIN — SODIUM CHLORIDE 5.65 UNITS/HR: 9 INJECTION, SOLUTION INTRAVENOUS at 00:57

## 2021-04-14 RX ADMIN — BACITRACIN: 500 OINTMENT TOPICAL at 20:18

## 2021-04-14 RX ADMIN — BACITRACIN: 500 OINTMENT TOPICAL at 08:57

## 2021-04-14 RX ADMIN — IBUPROFEN 400 MG: 200 SUSPENSION ORAL at 16:27

## 2021-04-14 RX ADMIN — LACTULOSE 20 G: 10 SOLUTION ORAL at 20:19

## 2021-04-14 RX ADMIN — SODIUM CHLORIDE, PRESERVATIVE FREE 10 ML: 5 INJECTION INTRAVENOUS at 20:25

## 2021-04-14 RX ADMIN — ACETAMINOPHEN 1000 MG: 650 SOLUTION ORAL at 20:19

## 2021-04-14 RX ADMIN — IPRATROPIUM BROMIDE AND ALBUTEROL SULFATE 1 AMPULE: .5; 3 SOLUTION RESPIRATORY (INHALATION) at 03:38

## 2021-04-14 RX ADMIN — IBUPROFEN 400 MG: 200 SUSPENSION ORAL at 09:08

## 2021-04-14 RX ADMIN — IBUPROFEN 400 MG: 200 SUSPENSION ORAL at 20:19

## 2021-04-14 RX ADMIN — HYDRALAZINE HYDROCHLORIDE 10 MG: 20 INJECTION INTRAMUSCULAR; INTRAVENOUS at 16:27

## 2021-04-14 RX ADMIN — SODIUM CHLORIDE, PRESERVATIVE FREE 10 ML: 5 INJECTION INTRAVENOUS at 09:05

## 2021-04-14 RX ADMIN — CHLORHEXIDINE GLUCONATE 0.12% ORAL RINSE 15 ML: 1.2 LIQUID ORAL at 08:57

## 2021-04-14 RX ADMIN — IBUPROFEN 400 MG: 200 SUSPENSION ORAL at 22:50

## 2021-04-14 RX ADMIN — FENTANYL CITRATE 100 MCG: 50 INJECTION, SOLUTION INTRAMUSCULAR; INTRAVENOUS at 12:15

## 2021-04-14 RX ADMIN — SODIUM CHLORIDE 1000 ML: 9 INJECTION, SOLUTION INTRAVENOUS at 09:01

## 2021-04-14 RX ADMIN — IPRATROPIUM BROMIDE AND ALBUTEROL SULFATE 1 AMPULE: .5; 3 SOLUTION RESPIRATORY (INHALATION) at 08:15

## 2021-04-14 RX ADMIN — SODIUM CHLORIDE, POTASSIUM CHLORIDE, SODIUM LACTATE AND CALCIUM CHLORIDE 60 ML/HR: 600; 310; 30; 20 INJECTION, SOLUTION INTRAVENOUS at 17:49

## 2021-04-14 ASSESSMENT — PULMONARY FUNCTION TESTS
PIF_VALUE: 30
PIF_VALUE: 28
PIF_VALUE: 25
PIF_VALUE: 29
PIF_VALUE: 27
PIF_VALUE: 28
PIF_VALUE: 25
PIF_VALUE: 19
PIF_VALUE: 26
PIF_VALUE: 23
PIF_VALUE: 23
PIF_VALUE: 21
PIF_VALUE: 28
PIF_VALUE: 19
PIF_VALUE: 27
PIF_VALUE: 28
PIF_VALUE: 29
PIF_VALUE: 25
PIF_VALUE: 1
PIF_VALUE: 28
PIF_VALUE: 31
PIF_VALUE: 22
PIF_VALUE: 28
PIF_VALUE: 26
PIF_VALUE: 1
PIF_VALUE: 28
PIF_VALUE: 16
PIF_VALUE: 29
PIF_VALUE: 28
PIF_VALUE: 29
PIF_VALUE: 29
PIF_VALUE: 25
PIF_VALUE: 31
PIF_VALUE: 29
PIF_VALUE: 27
PIF_VALUE: 29
PIF_VALUE: 29
PIF_VALUE: 28
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
## 2021-04-14 NOTE — PLAN OF CARE
Problem: OXYGENATION/RESPIRATORY FUNCTION  Goal: Patient will maintain patent airway  4/14/2021 1203 by Viraj Shah RN  Outcome: Ongoing  4/14/2021 1013 by Kashmir Paulino RCP  Outcome: Ongoing  4/13/2021 2236 by Isaias Mendenhall RCP  Outcome: Ongoing  Goal: Patient will achieve/maintain normal respiratory rate/effort  4/14/2021 1203 by Viraj Shah RN  Outcome: Ongoing  4/14/2021 1013 by Kashmir Paulino RCP  Outcome: Ongoing  Note:   PROVIDE ADEQUATE OXYGENATION WITH ACCEPTABLE SP02/ABG'S    [x]  IDENTIFY APPROPRIATE OXYGEN THERAPY  [x]   MONITOR SP02/ABG'S AS NEEDED   [x]   PATIENT EDUCATION AS NEEDED     4/13/2021 2236 by Isaias Mendenhall RCP  Outcome: Ongoing     Problem: MECHANICAL VENTILATION  Goal: Patient will maintain patent airway  4/14/2021 1203 by Viraj Shah RN  Outcome: Ongoing  4/14/2021 1013 by Kashmir Paulino RCP  Outcome: Ongoing  Note: MECHANICAL VENTILATION     [x]  PROVIDE OPTIMAL VENTILATION  [x]   ASSESS FOR EXTUBATION READINESS  [x]   ASSESS FOR WEANING READINESS  [x]  EXTUBATE AS TOLERATED  []  IMPLEMENT ADULT MECHANICAL VENTILATION PROTOCOL  [x]  MAINTAIN ADEQUATE OXYGENATION  [x]  PERFORM SPONTANEOUS WEANING TRIAL AS TOLERATED     4/13/2021 2236 by Isaias Mendenhall RCP  Outcome: Ongoing  Goal: Oral health is maintained or improved  4/14/2021 1203 by Viraj Shah RN  Outcome: Ongoing  4/14/2021 1013 by Kashmir Paulino RCP  Outcome: Ongoing  4/13/2021 2236 by Isaias Mendenhall RCP  Outcome: Ongoing  Goal: ET tube will be managed safely  4/14/2021 1203 by Viraj Shah RN  Outcome: Ongoing  4/14/2021 1013 by Kashmir Paulino RCP  Outcome: Ongoing  4/13/2021 2236 by Isaias Mendenhall RCP  Outcome: Ongoing  Goal: Ability to express needs and understand communication  4/14/2021 1203 by Viraj Shah RN  Outcome: Ongoing  4/14/2021 1013 by Kashmir Paulino RCP  Outcome: Ongoing  4/13/2021 2236 by Isaias Mendenhall RCP  Outcome: Ongoing  Goal: Mobility/activity is maintained at optimum level for patient  4/14/2021 1203 by Eda Paget, RN  Outcome: Ongoing  4/14/2021 1013 by Kip Reveles RCP  Outcome: Ongoing  4/13/2021 2236 by Shaggy Solares RCP  Outcome: Ongoing     Problem: SKIN INTEGRITY  Goal: Skin integrity is maintained or improved  4/14/2021 1203 by Eda Paget, RN  Outcome: Ongoing  4/14/2021 1013 by Kip Reveles RCP  Outcome: Ongoing  4/13/2021 2236 by Shaggy Solares RCP  Outcome: Ongoing     Problem: Falls - Risk of:  Goal: Will remain free from falls  Outcome: Ongoing  Goal: Absence of physical injury  Outcome: Ongoing     Problem: Confusion - Acute:  Goal: Absence of continued neurological deterioration signs and symptoms  Outcome: Ongoing  Goal: Mental status will be restored to baseline  Outcome: Ongoing     Problem: Discharge Planning:  Goal: Ability to perform activities of daily living will improve  Outcome: Ongoing  Goal: Participates in care planning  Outcome: Ongoing  Goal: Discharged to appropriate level of care  Outcome: Ongoing     Problem: Injury - Risk of, Physical Injury:  Goal: Will remain free from falls  Outcome: Ongoing  Goal: Absence of physical injury  Outcome: Ongoing     Problem: Mood - Altered:  Goal: Mood stable  Outcome: Ongoing  Goal: Absence of abusive behavior  Outcome: Ongoing  Goal: Verbalizations of feeling emotionally comfortable while being cared for will increase  Outcome: Ongoing     Problem: Psychomotor Activity - Altered:  Goal: Absence of psychomotor disturbance signs and symptoms  Outcome: Ongoing     Problem: Sensory Perception - Impaired:  Goal: Demonstrations of improved sensory functioning will increase  Outcome: Ongoing  Goal: Decrease in sensory misperception frequency  Outcome: Ongoing  Goal: Able to refrain from responding to false sensory perceptions  Outcome: Ongoing  Goal: Demonstrates accurate environmental perceptions  Outcome: Ongoing  Goal: Able to distinguish between reality-based and nonreality-based thinking  Outcome: Ongoing  Goal: Able to interrupt nonreality-based thinking  Outcome: Ongoing     Problem: Sleep Pattern Disturbance:  Goal: Appears well-rested  Outcome: Ongoing     Problem: Skin Integrity:  Goal: Will show no infection signs and symptoms  Outcome: Ongoing  Goal: Absence of new skin breakdown  Outcome: Ongoing     Problem: Infection - Surgical Site:  Goal: Will show no infection signs and symptoms  Outcome: Ongoing     Problem: Injury - Risk of, Postfracture Complications:  Goal: Absence of fat embolism  Outcome: Ongoing  Goal: Absence of compartment syndrome signs and symptoms  Outcome: Ongoing     Problem: Mobility - Impaired:  Goal: Mobility will improve to maximum level  Outcome: Ongoing     Problem: Pain - Acute:  Goal: Pain level will decrease  Outcome: Ongoing     Problem: Venous Thromboembolism:  Goal: Absence of deep vein thrombosis  Outcome: Ongoing  Goal: Absence of signs or symptoms of impaired coagulation  Outcome: Ongoing  Goal: Will show no signs or symptoms of venous thromboembolism  Outcome: Ongoing     Problem: Nutrition  Goal: Optimal nutrition therapy  Outcome: Ongoing

## 2021-04-14 NOTE — PROGRESS NOTES
Neurosurgery [facility]/Resident    Daily Progress Note   CC:No chief complaint on file. 4/14/2021  10:56 AM    Chart reviewed. No acute events overnight. No new complaints. Resting in bed remains intubated. On a touch a Levophed. T-max 38.2 overnight. Plan for tracheostomy today    Vitals:    04/14/21 0630 04/14/21 0645 04/14/21 0700 04/14/21 0815   BP:   (!) 121/33    Pulse: 67 64 63 67   Resp: 19 18 18 20   Temp:       TempSrc:       SpO2: 99% 98% 98% 100%   Weight:       Height:           PE:   E1 V1 TM 5  Intubated  PERRL  Briskly localizes left upper extremity  Spontaneous movement to left lower extremity  No movement to RUE to pain       Lab Results   Component Value Date    WBC 4.3 04/14/2021    HGB 8.1 (L) 04/14/2021    HCT 27.1 (L) 04/14/2021    PLT 89 (L) 04/14/2021    TRIG 128 04/10/2021    ALT 40 04/14/2021    AST 98 (H) 04/14/2021     (H) 04/14/2021    K 4.1 04/14/2021     (H) 04/14/2021    CREATININE 0.88 04/14/2021    BUN 42 (H) 04/14/2021    CO2 28 04/14/2021    INR 1.0 04/14/2021    LABA1C 6.3 (H) 04/12/2021       Radiology   Mri Cervical Spine Wo Contrast    Result Date: 4/13/2021  EXAMINATION: MRI OF THE CERVICAL SPINE WITHOUT CONTRAST 4/13/2021 1:02 pm TECHNIQUE: Multiplanar multisequence MRI of the cervical spine was performed without the administration of intravenous contrast. COMPARISON: None. HISTORY: ORDERING SYSTEM PROVIDED HISTORY: RUE weakness TECHNOLOGIST PROVIDED HISTORY: RUE weakness Reason for Exam: RUE weakness FINDINGS: BONES/ALIGNMENT: There is straightening of the cervical spine. The vertebral body heights are maintained. There is age-appropriate bone marrow signal. There is multilevel degenerative disc disease with loss of disc signal. There is disc space narrowing at the C5-6 and C6-7 levels. There is no spondylolisthesis. SPINAL CORD: Spinal cord is normal in caliber and signal. SOFT TISSUES: The posterior paraspinal soft tissues are unremarkable.   The prevertebral soft tissues are unremarkable. C2-C3: There is a disc osteophyte complex with uncovertebral and facet hypertrophy that is worse on the left compared to the right. There is no canal stenosis or right foraminal narrowing. There is mild left foraminal narrowing. C3-C4: There is a disc osteophyte complex with uncovertebral and facet hypertrophy. There is mild canal stenosis measuring 9 mm in AP dimension. There is mild bilateral foraminal narrowing. C4-C5: There is a disc osteophyte complex with uncovertebral and facet hypertrophy. There is no significant canal stenosis. There is mild bilateral foraminal narrowing. C5-C6: There is a disc osteophyte complex with uncovertebral and facet hypertrophy. There is no canal stenosis. There is mild-to-moderate right and severe left foraminal narrowing. C6-C7: There is a disc osteophyte complex with uncovertebral and facet hypertrophy. There is no canal stenosis or right foraminal narrowing. There is moderate left foraminal narrowing. C7-T1: There is no significant disc protrusion, spinal canal stenosis or neural foraminal narrowing. Multilevel degenerative disc disease with uncovertebral and facet hypertrophy with mild canal stenosis at C3-4. Bilateral foraminal narrowing as described above. Mri Brain Wo Contrast    Result Date: 4/13/2021  EXAMINATION: MRI OF THE BRAIN WITHOUT CONTRAST  4/13/2021 1:02 pm TECHNIQUE: Multiplanar multisequence MRI of the brain was performed without the administration of intravenous contrast. COMPARISON: None HISTORY: ORDERING SYSTEM PROVIDED HISTORY: RUE weakness TECHNOLOGIST PROVIDED HISTORY: RUE weakness Reason for Exam: RUE weakness Initial evaluation FINDINGS: INTRACRANIAL STRUCTURES/VENTRICLES: There are multiple areas of restricted diffusion throughout the brain parenchyma bilaterally that have corresponding signal abnormality on the FLAIR images.   There is hemorrhagic component to many of the lesions consistent

## 2021-04-14 NOTE — PROGRESS NOTES
Physical Therapy    DATE: 2021    NAME: Aissatou Valdez  MRN: [id number]   : 1950      Patient not seen this date for Physical Therapy due to:    Surgery/Procedure: tracheostomy planned for today. PT will check back 4/15/21.        Electronically signed by Brandee Singh PT on 2021 at 11:35 AM
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
## 2021-04-14 NOTE — PLAN OF CARE
Problem: OXYGENATION/RESPIRATORY FUNCTION  Goal: Patient will maintain patent airway  4/13/2021 2236 by Devan Keith RCP  Outcome: Ongoing     Problem: OXYGENATION/RESPIRATORY FUNCTION  Goal: Patient will achieve/maintain normal respiratory rate/effort  Description: Respiratory rate and effort will be within normal limits for the patient  4/13/2021 2236 by Devan Keith RCP  Outcome: Ongoing     Problem: MECHANICAL VENTILATION  Goal: Patient will maintain patent airway  4/13/2021 2236 by Devan Keith RCP  Outcome: Ongoing     Problem: MECHANICAL VENTILATION  Goal: Oral health is maintained or improved  4/13/2021 2236 by Devan Keith RCP  Outcome: Ongoing     Problem: MECHANICAL VENTILATION  Goal: ET tube will be managed safely  4/13/2021 2236 by Devan Keith RCP  Outcome: Ongoing     Problem: MECHANICAL VENTILATION  Goal: Ability to express needs and understand communication  4/13/2021 2236 by Devan Keith RCP  Outcome: Ongoing     Problem: MECHANICAL VENTILATION  Goal: Mobility/activity is maintained at optimum level for patient  4/13/2021 2236 by Devan Keith RCP  Outcome: Ongoing     Problem: SKIN INTEGRITY  Goal: Skin integrity is maintained or improved  4/13/2021 2236 by Devan Keith RCP  Outcome: Ongoing

## 2021-04-14 NOTE — PLAN OF CARE
Problem: Falls - Risk of:  Goal: Will remain free from falls  Description: Will remain free from falls  4/13/2021 2144 by Jyoti Elizabeth RN  Outcome: Ongoing  Problem: Injury - Risk of, Physical Injury:  Goal: Will remain free from falls  Description: Will remain free from falls  4/13/2021 2144 by Jyoti Elizabeth RN  Outcome: Ongoing     Problem: Psychomotor Activity - Altered:  Goal: Absence of psychomotor disturbance signs and symptoms  Description: Absence of psychomotor disturbance signs and symptoms  4/13/2021 2144 by Jyoti Elizabeth RN  Outcome: Ongoing
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
## 2021-04-14 NOTE — BRIEF OP NOTE
Brief Postoperative Note      Patient: America Solomon  YOB: 1950  MRN: [id number]    Date of Procedure: 4/14/2021    Pre-Op Diagnosis: RESPIRATORY FAILURE    Post-Op Diagnosis: Same       Procedure(s):  TRACHEOSTOMY CREATION    Surgeon(s):  Marisel Madden MD    Assistant:  Resident: Efrain De Anda DO    Anesthesia: General    Estimated Blood Loss (mL): Minimal    Complications: None    Specimens:   * No specimens in log *    Implants:  * No implants in log *      Drains:   NG/OG/NJ/NE Tube Orogastric Center mouth (Active)   Surrounding Skin Dry; Intact 04/14/21 0800   Securement device Yes 04/14/21 0800   Status Clamped 04/14/21 0800   Placement Verified by External Catheter Length 04/14/21 0800   NG/OG/NJ/NE External Measurement (cm) 63 cm 04/14/21 0800   Drainage Appearance Tan 04/12/21 0800   Tube Feeding Immune Enhancing 04/13/21 2000   Tube Feeding Status Stopped 04/14/21 0800   Rate/Schedule 0 mL/hr 04/14/21 0000   Tube Feeding Intake (mL) 166 ml 04/14/21 0000   Free Water Flush (mL) 300 mL 04/14/21 0600   Residual Volume (ml) 10 ml 04/10/21 0800   Output (mL) 0 ml 04/07/21 1200       Urethral Catheter (Active)   $ Urethral catheter insertion $ Not inserted for procedure 04/07/21 0400   Catheter Indications Need for fluid management in critically ill patients in a critical care setting not able to be managed by other means such as BSC with hat, bedpan, urinal, condom catheter, or short term intermittent urethral catherization 04/14/21 0800   Securement Device Date Changed 04/08/21 04/12/21 1600   Site Assessment No urethral drainage 04/14/21 0800   Urine Color Orange 04/14/21 0800   Urine Appearance Clear 04/14/21 0800   Urine Odor Malodorous 04/09/21 1730   Output (mL) 100 mL 04/14/21 1100       Fecal Management System (Active)   Stool Appearance Loose 04/14/21 0800   Stool Color Brown 04/14/21 0800   Stool Amount Small 04/14/21 0800   Fecal Management Tube Output 100 ml 04/14/21 0400 [REMOVED] ICP monitor (Removed)   Status To Pressure Monitoring 04/12/21 0800   Dressing Status Clean;Dry; Intact 04/12/21 0800   Dressing Type Other (Comment) 04/12/21 0800   Site Assessment Other (Comment) 04/12/21 0800   Drainage No Drainage 04/12/21 0800       Findings: Successful placement of a # 8 Shiley tracheostomy     Electronically signed by Greg Oliveros DO on 4/14/2021 at 1:00 PM
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
## 2021-04-14 NOTE — ANESTHESIA POSTPROCEDURE EVALUATION
Department of Anesthesiology  Postprocedure Note    Patient: Torsten Chowdhury  MRN: [id number]  YOB: 1950  Date of evaluation: 4/14/2021  Time:  12:52 PM     Procedure Summary     Date: 04/14/21 Room / Location: [address]    Anesthesia Start: 0801 Anesthesia Stop: 1792    Procedure: TRACHEOSTOMY CREATION (N/A ) Diagnosis: (RESPIRATORY FAILURE)    Surgeons: Mei Bautista MD Responsible Provider: Edgar Tejada MD    Anesthesia Type: general ASA Status: 3          Anesthesia Type: general    René Phase I:      René Phase II:      Last vitals: Reviewed and per EMR flowsheets.        Anesthesia Post Evaluation    Patient location during evaluation: ICU  Patient participation: complete - patient cannot participate  Level of consciousness: sedated and ventilated  Nausea & Vomiting: no vomiting and no nausea  Complications: no  Cardiovascular status: hemodynamically stable  Respiratory status: ventilator  Hydration status: stable
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
## 2021-04-15 ENCOUNTER — APPOINTMENT (OUTPATIENT)
Dept: GENERAL RADIOLOGY | Age: 71
DRG: 003 | End: 2021-04-15
Payer: MEDICARE

## 2021-04-15 ENCOUNTER — APPOINTMENT (OUTPATIENT)
Dept: CT IMAGING | Age: 71
DRG: 003 | End: 2021-04-15
Payer: MEDICARE

## 2021-04-15 LAB
ABSOLUTE EOS #: 0 K/UL (ref 0–0.4)
ABSOLUTE EOS #: 0.22 K/UL (ref 0–0.4)
ABSOLUTE IMMATURE GRANULOCYTE: 0 K/UL (ref 0–0.3)
ABSOLUTE IMMATURE GRANULOCYTE: 0.06 K/UL (ref 0–0.3)
ABSOLUTE LYMPH #: 0.66 K/UL (ref 1–4.8)
ABSOLUTE LYMPH #: 0.77 K/UL (ref 1–4.8)
ABSOLUTE MONO #: 0.26 K/UL (ref 0.1–0.8)
ABSOLUTE MONO #: 0.39 K/UL (ref 0.1–0.8)
ALBUMIN SERPL-MCNC: 2.2 G/DL (ref 3.5–5.2)
ALBUMIN/GLOBULIN RATIO: 0.8 (ref 1–2.5)
ALLEN TEST: ABNORMAL
ALP BLD-CCNC: 94 U/L (ref 40–129)
ALT SERPL-CCNC: 47 U/L (ref 5–41)
ANION GAP SERPL CALCULATED.3IONS-SCNC: 6 MMOL/L (ref 9–17)
AST SERPL-CCNC: 102 U/L
BASOPHILS # BLD: 0 % (ref 0–2)
BASOPHILS # BLD: 2 % (ref 0–2)
BASOPHILS ABSOLUTE: 0 K/UL (ref 0–0.2)
BASOPHILS ABSOLUTE: 0.1 K/UL (ref 0–0.2)
BILIRUB SERPL-MCNC: 2.14 MG/DL (ref 0.3–1.2)
BILIRUBIN DIRECT: 1.19 MG/DL
BUN BLDV-MCNC: 41 MG/DL (ref 8–23)
BUN/CREAT BLD: ABNORMAL (ref 9–20)
CALCIUM SERPL-MCNC: 8.2 MG/DL (ref 8.6–10.4)
CHLORIDE BLD-SCNC: 121 MMOL/L (ref 98–107)
CO2: 23 MMOL/L (ref 20–31)
CREAT SERPL-MCNC: 0.96 MG/DL (ref 0.7–1.2)
DIFFERENTIAL TYPE: ABNORMAL
DIFFERENTIAL TYPE: ABNORMAL
EOSINOPHILS RELATIVE PERCENT: 0 % (ref 1–4)
EOSINOPHILS RELATIVE PERCENT: 4 % (ref 1–4)
FIO2: 40
GFR AFRICAN AMERICAN: >60 ML/MIN
GFR NON-AFRICAN AMERICAN: >60 ML/MIN
GFR SERPL CREATININE-BSD FRML MDRD: ABNORMAL ML/MIN/{1.73_M2}
GFR SERPL CREATININE-BSD FRML MDRD: ABNORMAL ML/MIN/{1.73_M2}
GLUCOSE BLD-MCNC: 107 MG/DL (ref 75–110)
GLUCOSE BLD-MCNC: 116 MG/DL (ref 75–110)
GLUCOSE BLD-MCNC: 127 MG/DL (ref 70–99)
GLUCOSE BLD-MCNC: 134 MG/DL (ref 74–100)
GLUCOSE BLD-MCNC: 134 MG/DL (ref 75–110)
GLUCOSE BLD-MCNC: 137 MG/DL (ref 75–110)
GLUCOSE BLD-MCNC: 138 MG/DL (ref 75–110)
GLUCOSE BLD-MCNC: 149 MG/DL (ref 75–110)
GLUCOSE BLD-MCNC: 160 MG/DL (ref 75–110)
GLUCOSE BLD-MCNC: 161 MG/DL (ref 75–110)
GLUCOSE BLD-MCNC: 163 MG/DL (ref 75–110)
GLUCOSE BLD-MCNC: 165 MG/DL (ref 75–110)
GLUCOSE BLD-MCNC: 166 MG/DL (ref 75–110)
GLUCOSE BLD-MCNC: 167 MG/DL (ref 75–110)
GLUCOSE BLD-MCNC: 169 MG/DL (ref 75–110)
GLUCOSE BLD-MCNC: 170 MG/DL (ref 75–110)
GLUCOSE BLD-MCNC: 171 MG/DL (ref 75–110)
GLUCOSE BLD-MCNC: 179 MG/DL (ref 75–110)
GLUCOSE BLD-MCNC: 184 MG/DL (ref 75–110)
GLUCOSE BLD-MCNC: 189 MG/DL (ref 75–110)
GLUCOSE BLD-MCNC: 197 MG/DL (ref 75–110)
GLUCOSE BLD-MCNC: 212 MG/DL (ref 75–110)
GLUCOSE BLD-MCNC: 213 MG/DL (ref 75–110)
HCT VFR BLD CALC: 19.1 % (ref 40.7–50.3)
HCT VFR BLD CALC: 26 % (ref 40.7–50.3)
HCT VFR BLD CALC: 26.6 % (ref 40.7–50.3)
HEMOGLOBIN: 5.7 G/DL (ref 13–17)
HEMOGLOBIN: 7.7 G/DL (ref 13–17)
HEMOGLOBIN: 8 G/DL (ref 13–17)
IMMATURE GRANULOCYTES: 0 %
IMMATURE GRANULOCYTES: 1 %
INR BLD: 1.1
LIPASE: 34 U/L (ref 13–60)
LYMPHOCYTES # BLD: 13 % (ref 24–44)
LYMPHOCYTES # BLD: 14 % (ref 24–44)
MAGNESIUM: 2.7 MG/DL (ref 1.6–2.6)
MCH RBC QN AUTO: 31.2 PG (ref 25.2–33.5)
MCH RBC QN AUTO: 31.8 PG (ref 25.2–33.5)
MCHC RBC AUTO-ENTMCNC: 29.6 G/DL (ref 28.4–34.8)
MCHC RBC AUTO-ENTMCNC: 29.8 G/DL (ref 28.4–34.8)
MCV RBC AUTO: 105.3 FL (ref 82.6–102.9)
MCV RBC AUTO: 106.7 FL (ref 82.6–102.9)
MODE: ABNORMAL
MONOCYTES # BLD: 5 % (ref 1–7)
MONOCYTES # BLD: 7 % (ref 1–7)
MORPHOLOGY: ABNORMAL
NEGATIVE BASE EXCESS, ART: ABNORMAL (ref 0–2)
NRBC AUTOMATED: 0.9 PER 100 WBC
NRBC AUTOMATED: 1.2 PER 100 WBC
NUCLEATED RED BLOOD CELLS: 1 PER 100 WBC
O2 DEVICE/FLOW/%: ABNORMAL
PARTIAL THROMBOPLASTIN TIME: 23 SEC (ref 20.5–30.5)
PARTIAL THROMBOPLASTIN TIME: 31.1 SEC (ref 20.5–30.5)
PARTIAL THROMBOPLASTIN TIME: 43.1 SEC (ref 20.5–30.5)
PATIENT TEMP: ABNORMAL
PDW BLD-RTO: 16.7 % (ref 11.8–14.4)
PDW BLD-RTO: 16.8 % (ref 11.8–14.4)
PHOSPHORUS: 2.4 MG/DL (ref 2.5–4.5)
PLATELET # BLD: 70 K/UL (ref 138–453)
PLATELET # BLD: 71 K/UL (ref 138–453)
PLATELET ESTIMATE: ABNORMAL
PLATELET ESTIMATE: ABNORMAL
PMV BLD AUTO: 10.9 FL (ref 8.1–13.5)
PMV BLD AUTO: 11.2 FL (ref 8.1–13.5)
POC HCO3: 24.6 MMOL/L (ref 21–28)
POC LACTIC ACID: 1.25 MMOL/L (ref 0.56–1.39)
POC O2 SATURATION: 98 % (ref 94–98)
POC PCO2 TEMP: ABNORMAL MM HG
POC PCO2: 32.8 MM HG (ref 35–48)
POC PH TEMP: ABNORMAL
POC PH: 7.48 (ref 7.35–7.45)
POC PO2 TEMP: ABNORMAL MM HG
POC PO2: 92.6 MM HG (ref 83–108)
POSITIVE BASE EXCESS, ART: 1 (ref 0–3)
POTASSIUM SERPL-SCNC: 3.9 MMOL/L (ref 3.7–5.3)
PROTHROMBIN TIME: 11.8 SEC (ref 9.1–12.3)
RBC # BLD: 1.79 M/UL (ref 4.21–5.77)
RBC # BLD: 2.47 M/UL (ref 4.21–5.77)
RBC # BLD: ABNORMAL 10*6/UL
RBC # BLD: ABNORMAL 10*6/UL
SAMPLE SITE: ABNORMAL
SEG NEUTROPHILS: 74 % (ref 36–66)
SEG NEUTROPHILS: 80 % (ref 36–66)
SEGMENTED NEUTROPHILS ABSOLUTE COUNT: 4.06 K/UL (ref 1.8–7.7)
SEGMENTED NEUTROPHILS ABSOLUTE COUNT: 4.08 K/UL (ref 1.8–7.7)
SODIUM BLD-SCNC: 150 MMOL/L (ref 135–144)
TCO2 (CALC), ART: 26 MMOL/L (ref 22–29)
TOTAL PROTEIN: 5 G/DL (ref 6.4–8.3)
WBC # BLD: 5.1 K/UL (ref 3.5–11.3)
WBC # BLD: 5.5 K/UL (ref 3.5–11.3)
WBC # BLD: ABNORMAL 10*3/UL
WBC # BLD: ABNORMAL 10*3/UL

## 2021-04-15 PROCEDURE — 94003 VENT MGMT INPAT SUBQ DAY: CPT

## 2021-04-15 PROCEDURE — 71045 X-RAY EXAM CHEST 1 VIEW: CPT

## 2021-04-15 PROCEDURE — 6370000000 HC RX 637 (ALT 250 FOR IP): Performed by: NURSE PRACTITIONER

## 2021-04-15 PROCEDURE — 95720 EEG PHY/QHP EA INCR W/VEEG: CPT | Performed by: PSYCHIATRY & NEUROLOGY

## 2021-04-15 PROCEDURE — 83605 ASSAY OF LACTIC ACID: CPT

## 2021-04-15 PROCEDURE — 85730 THROMBOPLASTIN TIME PARTIAL: CPT

## 2021-04-15 PROCEDURE — 82248 BILIRUBIN DIRECT: CPT

## 2021-04-15 PROCEDURE — 82947 ASSAY GLUCOSE BLOOD QUANT: CPT

## 2021-04-15 PROCEDURE — 97163 PT EVAL HIGH COMPLEX 45 MIN: CPT

## 2021-04-15 PROCEDURE — 6360000002 HC RX W HCPCS: Performed by: STUDENT IN AN ORGANIZED HEALTH CARE EDUCATION/TRAINING PROGRAM

## 2021-04-15 PROCEDURE — 2580000003 HC RX 258: Performed by: STUDENT IN AN ORGANIZED HEALTH CARE EDUCATION/TRAINING PROGRAM

## 2021-04-15 PROCEDURE — 85018 HEMOGLOBIN: CPT

## 2021-04-15 PROCEDURE — 85025 COMPLETE CBC W/AUTO DIFF WBC: CPT

## 2021-04-15 PROCEDURE — 97110 THERAPEUTIC EXERCISES: CPT

## 2021-04-15 PROCEDURE — 85014 HEMATOCRIT: CPT

## 2021-04-15 PROCEDURE — 84100 ASSAY OF PHOSPHORUS: CPT

## 2021-04-15 PROCEDURE — 83690 ASSAY OF LIPASE: CPT

## 2021-04-15 PROCEDURE — 82803 BLOOD GASES ANY COMBINATION: CPT

## 2021-04-15 PROCEDURE — 84145 PROCALCITONIN (PCT): CPT

## 2021-04-15 PROCEDURE — 2000000000 HC ICU R&B

## 2021-04-15 PROCEDURE — APPNB15 APP NON BILLABLE TIME 0-15 MINS: Performed by: NURSE PRACTITIONER

## 2021-04-15 PROCEDURE — 2580000003 HC RX 258: Performed by: NURSE PRACTITIONER

## 2021-04-15 PROCEDURE — 70450 CT HEAD/BRAIN W/O DYE: CPT

## 2021-04-15 PROCEDURE — 6360000004 HC RX CONTRAST MEDICATION: Performed by: NURSE PRACTITIONER

## 2021-04-15 PROCEDURE — 6370000000 HC RX 637 (ALT 250 FOR IP): Performed by: STUDENT IN AN ORGANIZED HEALTH CARE EDUCATION/TRAINING PROGRAM

## 2021-04-15 PROCEDURE — 83735 ASSAY OF MAGNESIUM: CPT

## 2021-04-15 PROCEDURE — 80053 COMPREHEN METABOLIC PANEL: CPT

## 2021-04-15 PROCEDURE — 94761 N-INVAS EAR/PLS OXIMETRY MLT: CPT

## 2021-04-15 PROCEDURE — 37799 UNLISTED PX VASCULAR SURGERY: CPT

## 2021-04-15 PROCEDURE — 99232 SBSQ HOSP IP/OBS MODERATE 35: CPT | Performed by: NURSE PRACTITIONER

## 2021-04-15 PROCEDURE — 99291 CRITICAL CARE FIRST HOUR: CPT | Performed by: NEUROLOGICAL SURGERY

## 2021-04-15 PROCEDURE — 71260 CT THORAX DX C+: CPT

## 2021-04-15 PROCEDURE — 85610 PROTHROMBIN TIME: CPT

## 2021-04-15 PROCEDURE — 2700000000 HC OXYGEN THERAPY PER DAY

## 2021-04-15 RX ORDER — FUROSEMIDE 10 MG/ML
20 INJECTION INTRAMUSCULAR; INTRAVENOUS ONCE
Status: COMPLETED | OUTPATIENT
Start: 2021-04-15 | End: 2021-04-15

## 2021-04-15 RX ADMIN — SODIUM CHLORIDE, POTASSIUM CHLORIDE, SODIUM LACTATE AND CALCIUM CHLORIDE 1000 ML: 600; 310; 30; 20 INJECTION, SOLUTION INTRAVENOUS at 14:51

## 2021-04-15 RX ADMIN — CHLORHEXIDINE GLUCONATE 0.12% ORAL RINSE 15 ML: 1.2 LIQUID ORAL at 08:30

## 2021-04-15 RX ADMIN — IBUPROFEN 400 MG: 200 SUSPENSION ORAL at 02:07

## 2021-04-15 RX ADMIN — LACTULOSE 20 G: 10 SOLUTION ORAL at 22:00

## 2021-04-15 RX ADMIN — SODIUM CHLORIDE 7.07 UNITS/HR: 9 INJECTION, SOLUTION INTRAVENOUS at 05:03

## 2021-04-15 RX ADMIN — DIBASIC SODIUM PHOSPHATE, MONOBASIC POTASSIUM PHOSPHATE AND MONOBASIC SODIUM PHOSPHATE 1 TABLET: 852; 155; 130 TABLET ORAL at 08:20

## 2021-04-15 RX ADMIN — IBUPROFEN 400 MG: 200 SUSPENSION ORAL at 15:01

## 2021-04-15 RX ADMIN — SPIRONOLACTONE 50 MG: 25 TABLET ORAL at 08:21

## 2021-04-15 RX ADMIN — LACTULOSE 20 G: 10 SOLUTION ORAL at 13:28

## 2021-04-15 RX ADMIN — Medication 30 MG: at 20:07

## 2021-04-15 RX ADMIN — LACTULOSE 20 G: 10 SOLUTION ORAL at 08:21

## 2021-04-15 RX ADMIN — BACITRACIN: 500 OINTMENT TOPICAL at 08:12

## 2021-04-15 RX ADMIN — Medication 30 MG: at 08:21

## 2021-04-15 RX ADMIN — ACETAMINOPHEN 1000 MG: 650 SOLUTION ORAL at 18:38

## 2021-04-15 RX ADMIN — HEPARIN SODIUM AND DEXTROSE 13 UNITS/KG/HR: 10000; 5 INJECTION INTRAVENOUS at 21:32

## 2021-04-15 RX ADMIN — IOPAMIDOL 75 ML: 755 INJECTION, SOLUTION INTRAVENOUS at 09:48

## 2021-04-15 RX ADMIN — IBUPROFEN 400 MG: 200 SUSPENSION ORAL at 11:31

## 2021-04-15 RX ADMIN — SODIUM CHLORIDE, PRESERVATIVE FREE 10 ML: 5 INJECTION INTRAVENOUS at 20:06

## 2021-04-15 RX ADMIN — BACITRACIN: 500 OINTMENT TOPICAL at 20:07

## 2021-04-15 RX ADMIN — FUROSEMIDE 20 MG: 10 INJECTION, SOLUTION INTRAMUSCULAR; INTRAVENOUS at 13:25

## 2021-04-15 RX ADMIN — SODIUM CHLORIDE, PRESERVATIVE FREE 10 ML: 5 INJECTION INTRAVENOUS at 08:14

## 2021-04-15 RX ADMIN — IBUPROFEN 400 MG: 200 SUSPENSION ORAL at 23:30

## 2021-04-15 RX ADMIN — POTASSIUM BICARBONATE 10 MEQ: 782 TABLET, EFFERVESCENT ORAL at 08:21

## 2021-04-15 RX ADMIN — IBUPROFEN 400 MG: 200 SUSPENSION ORAL at 08:24

## 2021-04-15 RX ADMIN — CHLORHEXIDINE GLUCONATE 0.12% ORAL RINSE 15 ML: 1.2 LIQUID ORAL at 20:07

## 2021-04-15 RX ADMIN — ACETAMINOPHEN 1000 MG: 650 SOLUTION ORAL at 11:30

## 2021-04-15 RX ADMIN — ACETAMINOPHEN 1000 MG: 650 SOLUTION ORAL at 02:07

## 2021-04-15 RX ADMIN — IBUPROFEN 400 MG: 200 SUSPENSION ORAL at 18:38

## 2021-04-15 ASSESSMENT — PULMONARY FUNCTION TESTS
PIF_VALUE: 18
PIF_VALUE: 22
PIF_VALUE: 28
PIF_VALUE: 23
PIF_VALUE: 17

## 2021-04-15 NOTE — CARE COORDINATION
SBIRT deferred due to pt's medical condition.          Deferred [x]    Completed on: 4/15/2021   ARACELI Bermudez

## 2021-04-15 NOTE — PROCEDURES
[address] [place] [address] 30        CONTINUOUS VIDEO EEG MONITORING           PATIENT: Guillermina Suarez  MRN #: [id number]  DATE: 4/14/2021 at 4:36PM to 4/16/2021 at 9:52AM  REFERRING PHYSICIAN:  DON Wayne     BRIEF HISTORY: Patient is a 66year old male who presented with TBI, SAH, LTME was ordered to evaluate. AEDs:   Non    EEG DESCRIPTION: 21 EEG electrodes were placed according to International 10/20 System. Single EKG electrode was also placed. Video recording was time-locked with EEG recording. BASELINE: The background was in continuous slow in delta and theta frequency of 1-5Hz, ranging between 10-50uV. There was no posterior dominant rhythm, no eye opening/closing artifacts. Spontaneous variability was present. Hyperventilation and photic stimulation were not performed. Single lead EKG showed regular, heart rate at 70s-90s per minute. Day 1 - recording started from 4/14/2021 at 4:36PM  AEDs:  Non  Interictal: Continuous slow, generalized in delta and theta frequency of 1-5Hz. Ictal: Non    Summary: During above recoding period, there was evidence of severe diffuse encephalopathy. Day 2 -4/15/2021   AEDs:  Non  Interictal: Continuous slow, generalized in delta and theta frequency of 1-5Hz. Slightly improvement with increased     theta activity. Ictal: Non    Summary: During above recoding period, there was evidence of severe diffuse encephalopathy. No EEG or clinical seizures were noted. Day 3 -4/16/2021   AEDs:  Non  Interictal: Continuous slow, generalized in delta and theta frequency of 1-5Hz. Slightly improvement with increased theta activity. Ictal: Non    Summary: During above recoding period, there was evidence of severe diffuse encephalopathy. Day 4 -4/17/2021 through 9:52AM  AEDs:  Non  Interictal: Continuous slow, generalized in delta and theta frequency of 1-5Hz.  Slightly improvement with increased theta activity. Lead artifacts on left sides in the morning. Ictal: Non    Summary: During above recoding period, there was evidence of severe diffuse encephalopathy. CLASSIFICATION   Abnormal III (Coma)  1. Continuous slow, generalized    IMPRESSION  The patient underwent continuous video EEG monitoring from 4/14/2021 at 4:36PM to 4/16/2021 at 9:52AM, which showed evidence of severe diffuse encephalopathy. No epileptiform discharges or EEG/clinical seizures were noted.        Titus Brooks MD, [address] [place] [address], Neurology  Board Certified Epileptologist

## 2021-04-15 NOTE — PLAN OF CARE
2021    Name: Sandrine Pritchett  MRN: [id number]  : 2003    Reason for visit: Medication management for depression, ADHDThis visit was conducted over the video as a result of the COVID-19 pandemic. This patient verbally consents to a video visit. Patient identifies as Sandrine Pritchett and reports she is currently located at home.     The video-only visit was being conducted for the purpose of providing treatment advice during a public health emergency. The treatment advice that was being provided was based on what was reported by the patient. Without the patient being seen and evaluated in person, there would be some risk that the information and/or assessment provided by the [facility] provider might be incomplete or inaccurate.    Chief Complaint:  Follow-up appointment for ADHD, depression    Medications:  Current Outpatient Medications   Medication Sig Dispense Refill   • buPROPion XL (WELLBUTRIN XL) 150 MG 24 hr tablet 1 tab po daily 30 tablet 1   • traZODone (DESYREL) 50 MG tablet Take 1 tablet by mouth nightly. 30 tablet 5   • loratadine (CLARITIN) 10 MG tablet Take 10 mg by mouth daily.     • etonogestrel (NEXPLANON) 68 MG implant Inject 68 mg into the skin 1 time. Due for removal after 3 years on 2022.  NDC: 6855-4150-90       No current facility-administered medications for this visit.       No major side effects.    Allergies:   ALLERGIES:   Allergen Reactions   • Cat Dander    • Dog Dander    • Dust        Visit Vitals  LMP 2020         Subjective:  History was obtained from patient.  Chart was reviewed.  Patient reported she has been struggling doing her school work.  She has been feeling like her depression is getting better.  She has stopped taking her medication.  She is no longer seeing her therapist.  Patient reported because she was feeling better she decided to discontinue medication.  She still has probably once a week a bad day.  At that time she has been having difficulty  Nutrition Problem #1: Inadequate oral intake  Intervention: Food and/or Nutrient Delivery: Continue NPO, Re-start enteral nutrition post-op as able  Nutritional Goals: EN intake to meet >75% of estimated nutrition needs getting out of the bed.  She reported sometimes she gets upset about something which triggers a bad day.  Patient has been taking majority of the classes online.  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  Patient has been getting behind with her school work.  Patient is considering to try a medication to address her ADHD symptoms.  She has been having difficulty focusing and concentrating.  She gets distracted easily.  She has fidgety.  She has been having difficulty finishing her work.          Patient's symptoms are present in all settings.  Nothing is actually making it further worse or better.  .       Comprehensive Past/Family/Social history which was performed during initial evaluation was reexamined and reviewed with patient/family. There is nothing new to add today. For details, please refer to my initial evaluation note in this chart.    Mental Status Examination:  Casually dressed, healthy looking, [x] well [] poorly groomed [x] young [] middle aged   [] old, [] man [x] woman showed   [x] good [] partial [] no interest in interview.    Eye to eye contact was [x] maintained, [] not maintained, [] partially maintained.  Rapport established.      Mood was [x] euthymic, []depressed, [] irritable, [] anxious, [] angry, [] euphoric,   [] empty, [] guilty, [] perplexed.    It was [x] consistent, [] fluctuating, [] alternating rapidly between extremes during the interview.    Speech was [x] spontaneous, [] not spontaneous    Rate and rhythm was [x] regular, [] slow, [] pressured, [] hesitant, [] emotional,   [] dramatic, [] loud, [] monotonous, [] whispered, [] slurred.    Attention and concentration was [x] good, [] poor, [] impaired.    Thought process was [x] logical and coherent, [] illogical, [] incomprehensible.    Rate of thoughts was [x] normal, [] slow, [] hesitant, [] rapid, [] poverty of ideas,   [] overabundance of ideas, [] racing, [] flights of ideas, [] thought  blocking.    Associations of thoughts:  [x] Intact, [] loose, [] circumstantial, [] tangential,   [] word salad, [] neologisms.    Tics  Yes  []    No  [x]     Suicidal ideations:  [x] none, [] passive, [] present with plan.    Alert and oriented x3.   Language fluent.    Judgment was [x] fair, [] poor, [] impaired.    Insight was [] good, [x] limited, [] poor.    Fund of knowledge was [x] good, [] limited, [] poor.          Diagnosis:  Major depressive disorder, single moderate  ADHD inattentive type  Treatment Intervention: Patient  agreed with above-mentioned medication management.  Patient was started on Adderall 10 mg twice a day.  If needed we will consider to modify that medication further in future.  We also drafted a letter for her.   Patient agreed with treatment plan.    Labs ordered   []   Labs reviewed   []     I will follow up with patient in 6 weeks if needed. If any safety concern arises, patient will call 911, go to emergency room, or nearby hospital. Patient can call my office for any questions or concerns during regular business hours.    Nate Adorno MD
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
## 2021-04-15 NOTE — PROGRESS NOTES
Neurosurgery KORINA/Resident    Daily Progress Note   CC:No chief complaint on file. 4/15/2021  11:53 AM    Chart reviewed. No acute events overnight. No new complaints. Patient seen and examined this morning with Dr. Anastasia Burris  Globin 5.7 this morning T-max 39.2, heparin was turned off as patient is going to IR for PEG placement per nursing staff     Vitals:    04/15/21 0900 04/15/21 1000 04/15/21 1003 04/15/21 1108   BP:       Pulse: 78 74 75    Resp: 20 25     Temp:       TempSrc:       SpO2: 98% 99% 99%    Weight:       Height:    5' 11\" (1.803 m)       PE:   E2 V1T M5   Intubated   Opening eyes to painful stimulation  Localizing left upper extremity   Flicker of movement right upper extremity  Wound pain bilateral lower extremities      Lab Results   Component Value Date    WBC 5.1 04/15/2021    HGB 8.0 (L) 04/15/2021    HGB 7.7 (L) 04/15/2021    HCT 26.6 (L) 04/15/2021    HCT 26.0 (L) 04/15/2021    PLT 70 (L) 04/15/2021    TRIG 128 04/10/2021    ALT 47 (H) 04/15/2021     (H) 04/15/2021     (H) 04/15/2021    K 3.9 04/15/2021     (H) 04/15/2021    CREATININE 0.96 04/15/2021    BUN 41 (H) 04/15/2021    CO2 23 04/15/2021    INR 1.1 04/15/2021    LABA1C 6.3 (H) 04/12/2021           A/P  66 y.o. male who presents with  small scattered intraparenchymal hemorrhages, subarachnoid hemorrhage, left parietal depressed skull fracture,    No need for CT cervical  Obtain CT head have once PTT therapeutic    Please contact neurosurgery with any changes in patients neurologic status.        Justyn Augustin, CNP  4/15/21  11:53 AM
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
## 2021-04-15 NOTE — PLAN OF CARE
Problem: OXYGENATION/RESPIRATORY FUNCTION  Goal: Patient will maintain patent airway  4/15/2021 1447 by Dinorah Robles RCP  Outcome: Ongoing     Problem: OXYGENATION/RESPIRATORY FUNCTION  Goal: Patient will achieve/maintain normal respiratory rate/effort  Description: Respiratory rate and effort will be within normal limits for the patient  4/15/2021 1447 by Dinorah Robles RCP  Outcome: Ongoing  Note:   PROVIDE ADEQUATE OXYGENATION WITH ACCEPTABLE SP02/ABG'S    [x]  IDENTIFY APPROPRIATE OXYGEN THERAPY  [x]   MONITOR SP02/ABG'S AS NEEDED   [x]   PATIENT EDUCATION AS NEEDED        Problem: MECHANICAL VENTILATION  Goal: Patient will maintain patent airway  4/15/2021 1447 by Dinorah Robles RCP  Outcome: Ongoing  Note: MECHANICAL VENTILATION     [x]  PROVIDE OPTIMAL VENTILATION  [x]   ASSESS FOR EXTUBATION READINESS  [x]   ASSESS FOR WEANING READINESS  [x]  EXTUBATE AS TOLERATED  [x]  IMPLEMENT ADULT MECHANICAL VENTILATION PROTOCOL  [x]  MAINTAIN ADEQUATE OXYGENATION  [x]  PERFORM SPONTANEOUS WEANING TRIAL AS TOLERATED        Problem: MECHANICAL VENTILATION  Goal: Oral health is maintained or improved  4/15/2021 1447 by Dinorah Robles RCP  Outcome: Ongoing     Problem: MECHANICAL VENTILATION  Goal: ET tube will be managed safely  4/15/2021 1447 by Dinorah Robles RCP  Outcome: Ongoing     Problem: MECHANICAL VENTILATION  Goal: Ability to express needs and understand communication  4/15/2021 1447 by Dinorah Robles RCP  Outcome: Ongoing     Problem: MECHANICAL VENTILATION  Goal: Mobility/activity is maintained at optimum level for patient  4/15/2021 1447 by Dinorah Robles RCP  Outcome: Ongoing     Problem: SKIN INTEGRITY  Goal: Skin integrity is maintained or improved  4/15/2021 1447 by Dinorah Robles RCP  Outcome: Ongoing     Problem: RESPIRATORY  Intervention: Administer treatments as ordered  Note: BRONCHOSPASM/BRONCHOCONSTRICTION     [x]         IMPROVE AERATION/BREATH SOUNDS  [x]   ADMINISTER BRONCHODILATOR THERAPY AS APPROPRIATE  [x]   ASSESS BREATH SOUNDS  []   IMPLEMENT AEROSOL/MDI PROTOCOL  [x]   PATIENT EDUCATION AS NEEDED
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
## 2021-04-15 NOTE — PLAN OF CARE
Ongoing  4/15/2021 0650 by Emma Macias RN  Outcome: Ongoing  Goal: Mobility/activity is maintained at optimum level for patient  4/15/2021 1713 by Quincy Ivory RN  Outcome: Ongoing  4/15/2021 1447 by Kip Reveles RCP  Outcome: Ongoing  4/15/2021 0650 by Emma Macias RN  Outcome: Ongoing     Problem: SKIN INTEGRITY  Goal: Skin integrity is maintained or improved  4/15/2021 1713 by Quincy Ivory RN  Outcome: Ongoing  4/15/2021 1447 by Kip Reveles RCP  Outcome: Ongoing  4/15/2021 0650 by Emma Macias RN  Outcome: Ongoing     Problem: Falls - Risk of:  Goal: Will remain free from falls  Description: Will remain free from falls  4/15/2021 1713 by Quincy Ivory RN  Outcome: Ongoing  4/15/2021 0650 by Emma Macias RN  Outcome: Ongoing  Goal: Absence of physical injury  Description: Absence of physical injury  4/15/2021 1713 by Quincy Ivory RN  Outcome: Ongoing  4/15/2021 0650 by Emma Macias RN  Outcome: Ongoing     Problem: Confusion - Acute:  Goal: Absence of continued neurological deterioration signs and symptoms  Description: Absence of continued neurological deterioration signs and symptoms  4/15/2021 1713 by Quincy Ivory RN  Outcome: Ongoing  4/15/2021 0650 by Emma Macias RN  Outcome: Ongoing  Goal: Mental status will be restored to baseline  Description: Mental status will be restored to baseline  4/15/2021 1713 by Quincy Ivory RN  Outcome: Ongoing  4/15/2021 0650 by Emma Macias RN  Outcome: Ongoing     Problem: Discharge Planning:  Goal: Ability to perform activities of daily living will improve  Description: Ability to perform activities of daily living will improve  4/15/2021 1713 by Quincy Ivory RN  Outcome: Ongoing  4/15/2021 0650 by Emma Macias RN  Outcome: Ongoing  Goal: Participates in care planning  Description: Participates in care planning  4/15/2021 1713 by Quincy Ivory RN  Outcome: Ongoing  4/15/2021 0650 by Starla Whitehead RN  Outcome: Ongoing  Goal: Discharged to appropriate level of care  Description: Discharged to appropriate level of care  4/15/2021 1713 by Dane Castillo RN  Outcome: Ongoing  4/15/2021 0650 by Starla Whitehead RN  Outcome: Ongoing     Problem: Injury - Risk of, Physical Injury:  Goal: Will remain free from falls  Description: Will remain free from falls  4/15/2021 1713 by Dane Castillo RN  Outcome: Ongoing  4/15/2021 0650 by Starla Whitehead RN  Outcome: Ongoing  Goal: Absence of physical injury  Description: Absence of physical injury  4/15/2021 1713 by Dane Castillo RN  Outcome: Ongoing  4/15/2021 0650 by Starla Whitehead RN  Outcome: Ongoing     Problem: Mood - Altered:  Goal: Mood stable  Description: Mood stable  4/15/2021 1713 by Dane Castillo RN  Outcome: Ongoing  4/15/2021 0650 by Starla Whitehead RN  Outcome: Ongoing  Goal: Absence of abusive behavior  Description: Absence of abusive behavior  4/15/2021 1713 by Dane Castillo RN  Outcome: Ongoing  4/15/2021 0650 by Starla Whitehead RN  Outcome: Ongoing  Goal: Verbalizations of feeling emotionally comfortable while being cared for will increase  Description: Verbalizations of feeling emotionally comfortable while being cared for will increase  4/15/2021 1713 by Dane Castillo RN  Outcome: Ongoing  4/15/2021 0650 by Starla Whitehead RN  Outcome: Ongoing     Problem: Psychomotor Activity - Altered:  Goal: Absence of psychomotor disturbance signs and symptoms  Description: Absence of psychomotor disturbance signs and symptoms  4/15/2021 1713 by Dane Castillo RN  Outcome: Ongoing  4/15/2021 0650 by Starla Whitehead RN  Outcome: Ongoing     Problem: Sensory Perception - Impaired:  Goal: Demonstrations of improved sensory functioning will increase  Description: Demonstrations of improved sensory functioning will increase  4/15/2021 1713 by Dane Castillo

## 2021-04-15 NOTE — PLAN OF CARE
Problem: OXYGENATION/RESPIRATORY FUNCTION  Goal: Patient will maintain patent airway  4/15/2021 0650 by Neelima Reyes RN  Outcome: Ongoing  4/15/2021 0650 by Neelima Reyes RN  Outcome: Ongoing  4/14/2021 2213 by [address]iel [address], RCP  Outcome: Ongoing  Goal: Patient will achieve/maintain normal respiratory rate/effort  Description: Respiratory rate and effort will be within normal limits for the patient  4/15/2021 0650 by Neelima Reyes RN  Outcome: Ongoing  4/15/2021 0650 by Neelima Reyes RN  Outcome: Ongoing  4/14/2021 2213 by [address]iel [address], RCP  Outcome: Ongoing     Problem: MECHANICAL VENTILATION  Goal: Patient will maintain patent airway  4/15/2021 0650 by Neelima Reyes RN  Outcome: Ongoing  4/15/2021 0650 by Neelima Reyes RN  Outcome: Ongoing  4/14/2021 2213 by [address]iel [address], RCP  Outcome: Ongoing  Goal: Oral health is maintained or improved  4/15/2021 0650 by Neelima Reyes RN  Outcome: Ongoing  4/15/2021 0650 by Neelima Reyes RN  Outcome: Ongoing  4/14/2021 2213 by [address]iel [address], RCP  Outcome: Ongoing  Goal: ET tube will be managed safely  4/15/2021 0650 by Neelima Reyes RN  Outcome: Ongoing  4/15/2021 0650 by Neelima Reyes RN  Outcome: Ongoing  4/14/2021 2213 by [address]iel [address], RCP  Outcome: Ongoing  Goal: Ability to express needs and understand communication  4/15/2021 0650 by Neelima Reyes RN  Outcome: Ongoing  4/15/2021 0650 by Neelima Reyes RN  Outcome: Ongoing  4/14/2021 2213 by Massiel [address], RCP  Outcome: Ongoing  Goal: Mobility/activity is maintained at optimum level for patient  4/15/2021 0650 by Neelima Reyes RN  Outcome: Ongoing  4/14/2021 2213 by Massiel Street, RCP  Outcome: Ongoing     Problem: SKIN INTEGRITY  Goal: Skin integrity is maintained or improved  4/15/2021 0650 by Neelima Turkmen, RN  Outcome: Ongoing  4/14/2021 2213 by Massiel [address], RCP  Outcome: Ongoing Problem: Falls - Risk of:  Goal: Will remain free from falls  Description: Will remain free from falls  Outcome: Ongoing  Goal: Absence of physical injury  Description: Absence of physical injury  Outcome: Ongoing     Problem: Confusion - Acute:  Goal: Absence of continued neurological deterioration signs and symptoms  Description: Absence of continued neurological deterioration signs and symptoms  Outcome: Ongoing  Goal: Mental status will be restored to baseline  Description: Mental status will be restored to baseline  Outcome: Ongoing     Problem: Discharge Planning:  Goal: Ability to perform activities of daily living will improve  Description: Ability to perform activities of daily living will improve  Outcome: Ongoing  Goal: Participates in care planning  Description: Participates in care planning  Outcome: Ongoing  Goal: Discharged to appropriate level of care  Description: Discharged to appropriate level of care  Outcome: Ongoing     Problem: Injury - Risk of, Physical Injury:  Goal: Will remain free from falls  Description: Will remain free from falls  Outcome: Ongoing  Goal: Absence of physical injury  Description: Absence of physical injury  Outcome: Ongoing     Problem: Mood - Altered:  Goal: Mood stable  Description: Mood stable  Outcome: Ongoing  Goal: Absence of abusive behavior  Description: Absence of abusive behavior  Outcome: Ongoing  Goal: Verbalizations of feeling emotionally comfortable while being cared for will increase  Description: Verbalizations of feeling emotionally comfortable while being cared for will increase  Outcome: Ongoing     Problem: Psychomotor Activity - Altered:  Goal: Absence of psychomotor disturbance signs and symptoms  Description: Absence of psychomotor disturbance signs and symptoms  Outcome: Ongoing     Problem: Sensory Perception - Impaired:  Goal: Demonstrations of improved sensory functioning will increase  Description: Demonstrations of improved sensory functioning will increase  Outcome: Ongoing  Goal: Decrease in sensory misperception frequency  Description: Decrease in sensory misperception frequency  Outcome: Ongoing  Goal: Able to refrain from responding to false sensory perceptions  Description: Able to refrain from responding to false sensory perceptions  Outcome: Ongoing  Goal: Demonstrates accurate environmental perceptions  Description: Demonstrates accurate environmental perceptions  Outcome: Ongoing  Goal: Able to distinguish between reality-based and nonreality-based thinking  Description: Able to distinguish between reality-based and nonreality-based thinking  Outcome: Ongoing  Goal: Able to interrupt nonreality-based thinking  Description: Able to interrupt nonreality-based thinking  Outcome: Ongoing     Problem: Sleep Pattern Disturbance:  Goal: Appears well-rested  Description: Appears well-rested  Outcome: Ongoing     Problem: Skin Integrity:  Goal: Will show no infection signs and symptoms  Description: Will show no infection signs and symptoms  Outcome: Ongoing  Goal: Absence of new skin breakdown  Description: Absence of new skin breakdown  Outcome: Ongoing     Problem: Infection - Surgical Site:  Goal: Will show no infection signs and symptoms  Description: Will show no infection signs and symptoms  Outcome: Ongoing     Problem: Injury - Risk of, Postfracture Complications:  Goal: Absence of fat embolism  Description: Absence of fat embolism  Outcome: Ongoing  Goal: Absence of compartment syndrome signs and symptoms  Description: Absence of compartment syndrome signs and symptoms  Outcome: Ongoing     Problem: Mobility - Impaired:  Goal: Mobility will improve to maximum level  Description: Mobility will improve to maximum level  Outcome: Ongoing     Problem: Pain - Acute:  Goal: Pain level will decrease  Description: Pain level will decrease  Outcome: Ongoing     Problem: Venous Thromboembolism:  Goal: Absence of deep vein thrombosis  Description: Absence of deep vein thrombosis  Outcome: Ongoing  Goal: Absence of signs or symptoms of impaired coagulation  Description: Absence of signs or symptoms of impaired coagulation  Outcome: Ongoing  Goal: Will show no signs or symptoms of venous thromboembolism  Description: Will show no signs or symptoms of venous thromboembolism  Outcome: Ongoing     Problem: Nutrition  Goal: Optimal nutrition therapy  Description: Nutrition Problem #1: Inadequate oral intake  Intervention: Food and/or Nutrient Delivery: (Start nutrition as able.  If TF, suggest Immune Enhancing formula with goal rate of 60 mL/hr to provide 2160 kcal and 135 g pro/day.)  Nutritional Goals: meet % of estimated nutrient needs     Outcome: Ongoing

## 2021-04-15 NOTE — PLAN OF CARE
Problem: OXYGENATION/RESPIRATORY FUNCTION  Goal: Patient will maintain patent airway  4/14/2021 2213 by Shaggy Solares RCP  Outcome: Ongoing     Problem: OXYGENATION/RESPIRATORY FUNCTION  Goal: Patient will achieve/maintain normal respiratory rate/effort  Description: Respiratory rate and effort will be within normal limits for the patient  4/14/2021 2213 by Shaggy Solares RCP  Outcome: Ongoing     Problem: MECHANICAL VENTILATION  Goal: Patient will maintain patent airway  4/14/2021 2213 by Shaggy Solares RCP  Outcome: Ongoing     Problem: MECHANICAL VENTILATION  Goal: Oral health is maintained or improved  4/14/2021 2213 by Shaggy Solares RCP  Outcome: Ongoing     Problem: MECHANICAL VENTILATION  Goal: ET tube will be managed safely  4/14/2021 2213 by Shaggy Solares RCP  Outcome: Ongoing     Problem: MECHANICAL VENTILATION  Goal: Ability to express needs and understand communication  4/14/2021 2213 by Shaggy Solares RCP  Outcome: Ongoing     Problem: MECHANICAL VENTILATION  Goal: Mobility/activity is maintained at optimum level for patient  4/14/2021 2213 by Shaggy Solares RCP  Outcome: Ongoing     Problem: SKIN INTEGRITY  Goal: Skin integrity is maintained or improved  4/14/2021 2213 by Shaggy Solares RCP  Outcome: Ongoing

## 2021-04-15 NOTE — PROGRESS NOTES
Comprehensive Nutrition Assessment    Type and Reason for Visit:  Reassess    Nutrition Recommendations/Plan: Continue NPO. Re-start enteral nutrition via PEG tube, post-op, as able. Will continue to monitor. Nutrition Assessment:  Patient's tube feeding currently stopped as he is getting a PEG tube placed later today. Prior, patient was tolerating tube feeding at goal rate of 60 mL/hr. Patient with FMS in place - 500 mL output noted x past 24 hours. Labs reviewed: hypernatremia, hypermagnesemia, hypophosphatemia noted. Meds reviewed: Lactulose, Aldactone. Weight fluctuations between 239-253 lbs since admission noted. Malnutrition Assessment:  Malnutrition Status: At risk for malnutrition   Context:  Acute Illness     Findings of the 6 clinical characteristics of malnutrition:  Energy Intake:  Mild decrease in energy intake   Weight Loss:  Unable to assess     Body Fat Loss:  No significant body fat loss     Muscle Mass Loss:  No significant muscle mass loss    Fluid Accumulation:  Moderate- Generalized   Strength:  Not Performed    Estimated Daily Nutrient Needs:  Energy (kcal):  2200 kcal/day; Weight Used for Energy Requirements:  Admission     Protein (g):  120 g pro/day; Weight Used for Protein Requirements:  Ideal(1.5)          Nutrition Related Findings:  Labs/Meds reviewed. OG tube in place. FMS with 500 mL output x past 24 hours. +2 pitting generalized edema. Wounds:  Multiple, Surgical Incision, multiple traumatic wounds    Current Nutrition Therapies:    DIET TUBE FEED CONTINUOUS/CYCLIC NPO;  Immune Enhancing; Orogastric; Continuous; 60  Current Tube Feeding (TF) Orders:  · Feeding Route: Orogastric  · Formula: Immune Enhancing(Pivot)  · Schedule: Continuous  · Water Flushes: 300 mL Q4H  · Current TF & Flush Orders Provides: Stopped  · Goal TF & Flush Orders Provides: @ 60 mL/hr = 2160 kcal, 135 g protein    Anthropometric Measures:  · Height: 5' 11\" (180.3 cm)  · Current Body Weight: 247 lb 9.2 oz (112.3 kg)   · Admission Body Weight: 247 lb (112 kg)    · Usual Body Weight: Unknown     · Ideal Body Weight: 172 lbs; % Ideal Body Weight 143.9 %   · BMI: 34.5  · Adjusted Body Weight:  No Adjustment   · BMI Categories: Obese Class 1 (BMI 30.0-34. 9)       Nutrition Diagnosis:   · Inadequate oral intake related to acute injury/trauma, impaired respiratory function as evidenced by NPO or clear liquid status due to medical condition, intubation    Nutrition Interventions:   Food and/or Nutrient Delivery:  Continue NPO, Re-start enteral nutrition post-op as able  Nutrition Education/Counseling:  No recommendation at this time   Coordination of Nutrition Care:  Continue to monitor while inpatient    Goals:  EN intake to meet >75% of estimated nutrition needs       Nutrition Monitoring and Evaluation:   Behavioral-Environmental Outcomes:  None Identified   Food/Nutrient Intake Outcomes:  Diet Advancement/Tolerance  Physical Signs/Symptoms Outcomes:  Biochemical Data, GI Status, Fluid Status or Edema, Nutrition Focused Physical Findings, Skin, Weight     Discharge Planning:     Too soon to determine     Electronically signed by Bianca Winter RD, LD on 4/15/21 at 11:15 AM EDT    Contact: [phone]

## 2021-04-16 ENCOUNTER — APPOINTMENT (OUTPATIENT)
Dept: CT IMAGING | Age: 71
DRG: 003 | End: 2021-04-16
Payer: MEDICARE

## 2021-04-16 ENCOUNTER — APPOINTMENT (OUTPATIENT)
Dept: INTERVENTIONAL RADIOLOGY/VASCULAR | Age: 71
DRG: 003 | End: 2021-04-16
Payer: MEDICARE

## 2021-04-16 ENCOUNTER — APPOINTMENT (OUTPATIENT)
Dept: GENERAL RADIOLOGY | Age: 71
DRG: 003 | End: 2021-04-16
Payer: MEDICARE

## 2021-04-16 LAB
-: NORMAL
ABSOLUTE EOS #: 0.07 K/UL (ref 0–0.44)
ABSOLUTE IMMATURE GRANULOCYTE: 0.04 K/UL (ref 0–0.3)
ABSOLUTE LYMPH #: 0.49 K/UL (ref 1.1–3.7)
ABSOLUTE MONO #: 0.35 K/UL (ref 0.1–1.2)
ALBUMIN SERPL-MCNC: 2.1 G/DL (ref 3.5–5.2)
ALBUMIN/GLOBULIN RATIO: 0.7 (ref 1–2.5)
ALLEN TEST: ABNORMAL
ALLEN TEST: ABNORMAL
ALP BLD-CCNC: 97 U/L (ref 40–129)
ALT SERPL-CCNC: 42 U/L (ref 5–41)
AMMONIA: 56 UMOL/L (ref 16–60)
ANION GAP SERPL CALCULATED.3IONS-SCNC: 8 MMOL/L (ref 9–17)
AST SERPL-CCNC: 73 U/L
BASOPHILS # BLD: 0 % (ref 0–2)
BASOPHILS ABSOLUTE: 0 K/UL (ref 0–0.2)
BILIRUB SERPL-MCNC: 1.32 MG/DL (ref 0.3–1.2)
BILIRUBIN DIRECT: 0.69 MG/DL
BUN BLDV-MCNC: 46 MG/DL (ref 8–23)
BUN/CREAT BLD: ABNORMAL (ref 9–20)
CALCIUM SERPL-MCNC: 7.9 MG/DL (ref 8.6–10.4)
CHLORIDE BLD-SCNC: 123 MMOL/L (ref 98–107)
CO2: 22 MMOL/L (ref 20–31)
CREAT SERPL-MCNC: 1.14 MG/DL (ref 0.7–1.2)
DIFFERENTIAL TYPE: ABNORMAL
EOSINOPHILS RELATIVE PERCENT: 2 % (ref 1–4)
FIO2: 30
FIO2: 40
GFR AFRICAN AMERICAN: >60 ML/MIN
GFR NON-AFRICAN AMERICAN: >60 ML/MIN
GFR SERPL CREATININE-BSD FRML MDRD: ABNORMAL ML/MIN/{1.73_M2}
GFR SERPL CREATININE-BSD FRML MDRD: ABNORMAL ML/MIN/{1.73_M2}
GLUCOSE BLD-MCNC: 104 MG/DL (ref 75–110)
GLUCOSE BLD-MCNC: 127 MG/DL (ref 75–110)
GLUCOSE BLD-MCNC: 133 MG/DL (ref 75–110)
GLUCOSE BLD-MCNC: 139 MG/DL (ref 75–110)
GLUCOSE BLD-MCNC: 139 MG/DL (ref 75–110)
GLUCOSE BLD-MCNC: 142 MG/DL (ref 75–110)
GLUCOSE BLD-MCNC: 145 MG/DL (ref 75–110)
GLUCOSE BLD-MCNC: 149 MG/DL (ref 75–110)
GLUCOSE BLD-MCNC: 151 MG/DL (ref 75–110)
GLUCOSE BLD-MCNC: 155 MG/DL (ref 75–110)
GLUCOSE BLD-MCNC: 156 MG/DL (ref 75–110)
GLUCOSE BLD-MCNC: 166 MG/DL (ref 75–110)
GLUCOSE BLD-MCNC: 166 MG/DL (ref 75–110)
GLUCOSE BLD-MCNC: 170 MG/DL (ref 75–110)
GLUCOSE BLD-MCNC: 174 MG/DL (ref 75–110)
GLUCOSE BLD-MCNC: 182 MG/DL (ref 75–110)
GLUCOSE BLD-MCNC: 185 MG/DL (ref 75–110)
GLUCOSE BLD-MCNC: 195 MG/DL (ref 75–110)
GLUCOSE BLD-MCNC: 203 MG/DL (ref 75–110)
GLUCOSE BLD-MCNC: 213 MG/DL (ref 75–110)
GLUCOSE BLD-MCNC: 221 MG/DL (ref 75–110)
GLUCOSE BLD-MCNC: 255 MG/DL (ref 74–100)
GLUCOSE BLD-MCNC: 266 MG/DL (ref 70–99)
HCT VFR BLD CALC: 25.1 % (ref 40.7–50.3)
HEMOGLOBIN: 7.4 G/DL (ref 13–17)
IMMATURE GRANULOCYTES: 1 %
INR BLD: 1.1
LIPASE: 43 U/L (ref 13–60)
LYMPHOCYTES # BLD: 14 % (ref 24–43)
MAGNESIUM: 2.8 MG/DL (ref 1.6–2.6)
MCH RBC QN AUTO: 31.8 PG (ref 25.2–33.5)
MCHC RBC AUTO-ENTMCNC: 29.5 G/DL (ref 28.4–34.8)
MCV RBC AUTO: 107.7 FL (ref 82.6–102.9)
MODE: ABNORMAL
MODE: ABNORMAL
MONOCYTES # BLD: 10 % (ref 3–12)
MORPHOLOGY: ABNORMAL
NEGATIVE BASE EXCESS, ART: ABNORMAL (ref 0–2)
NEGATIVE BASE EXCESS, ART: ABNORMAL (ref 0–2)
NRBC AUTOMATED: 1.4 PER 100 WBC
O2 DEVICE/FLOW/%: ABNORMAL
O2 DEVICE/FLOW/%: ABNORMAL
PARTIAL THROMBOPLASTIN TIME: 58.3 SEC (ref 20.5–30.5)
PARTIAL THROMBOPLASTIN TIME: 63.2 SEC (ref 20.5–30.5)
PATIENT TEMP: ABNORMAL
PATIENT TEMP: ABNORMAL
PDW BLD-RTO: 17.4 % (ref 11.8–14.4)
PHOSPHORUS: 3.2 MG/DL (ref 2.5–4.5)
PLATELET # BLD: 62 K/UL (ref 138–453)
PLATELET ESTIMATE: ABNORMAL
PMV BLD AUTO: 10.9 FL (ref 8.1–13.5)
POC HCO3: 25.4 MMOL/L (ref 21–28)
POC HCO3: 25.4 MMOL/L (ref 21–28)
POC LACTIC ACID: 1.01 MMOL/L (ref 0.56–1.39)
POC LACTIC ACID: 1.1 MMOL/L (ref 0.56–1.39)
POC O2 SATURATION: 97 % (ref 94–98)
POC O2 SATURATION: 99 % (ref 94–98)
POC PCO2 TEMP: ABNORMAL MM HG
POC PCO2 TEMP: ABNORMAL MM HG
POC PCO2: 35.8 MM HG (ref 35–48)
POC PCO2: 37.9 MM HG (ref 35–48)
POC PH TEMP: ABNORMAL
POC PH TEMP: ABNORMAL
POC PH: 7.43 (ref 7.35–7.45)
POC PH: 7.46 (ref 7.35–7.45)
POC PO2 TEMP: ABNORMAL MM HG
POC PO2 TEMP: ABNORMAL MM HG
POC PO2: 141.3 MM HG (ref 83–108)
POC PO2: 88.3 MM HG (ref 83–108)
POSITIVE BASE EXCESS, ART: 1 (ref 0–3)
POSITIVE BASE EXCESS, ART: 1 (ref 0–3)
POTASSIUM SERPL-SCNC: 4.1 MMOL/L (ref 3.7–5.3)
PROCALCITONIN: 0.35 NG/ML
PROTHROMBIN TIME: 11.6 SEC (ref 9.1–12.3)
RBC # BLD: 2.33 M/UL (ref 4.21–5.77)
RBC # BLD: ABNORMAL 10*6/UL
REASON FOR REJECTION: NORMAL
SAMPLE SITE: ABNORMAL
SAMPLE SITE: ABNORMAL
SEG NEUTROPHILS: 73 % (ref 36–65)
SEGMENTED NEUTROPHILS ABSOLUTE COUNT: 2.55 K/UL (ref 1.5–8.1)
SODIUM BLD-SCNC: 152 MMOL/L (ref 135–144)
SODIUM BLD-SCNC: 153 MMOL/L (ref 135–144)
TCO2 (CALC), ART: 27 MMOL/L (ref 22–29)
TCO2 (CALC), ART: 27 MMOL/L (ref 22–29)
TOTAL PROTEIN: 5 G/DL (ref 6.4–8.3)
WBC # BLD: 3.5 K/UL (ref 3.5–11.3)
WBC # BLD: ABNORMAL 10*3/UL
ZZ NTE CLEAN UP: ORDERED TEST: NORMAL
ZZ NTE WITH NAME CLEAN UP: SPECIMEN SOURCE: NORMAL

## 2021-04-16 PROCEDURE — 99291 CRITICAL CARE FIRST HOUR: CPT | Performed by: NEUROLOGICAL SURGERY

## 2021-04-16 PROCEDURE — 43762 RPLC GTUBE NO REVJ TRC: CPT

## 2021-04-16 PROCEDURE — 6370000000 HC RX 637 (ALT 250 FOR IP): Performed by: NURSE PRACTITIONER

## 2021-04-16 PROCEDURE — APPSS30 APP SPLIT SHARED TIME 16-30 MINUTES: Performed by: REGISTERED NURSE

## 2021-04-16 PROCEDURE — 84100 ASSAY OF PHOSPHORUS: CPT

## 2021-04-16 PROCEDURE — 6360000002 HC RX W HCPCS: Performed by: NURSE PRACTITIONER

## 2021-04-16 PROCEDURE — 37799 UNLISTED PX VASCULAR SURGERY: CPT

## 2021-04-16 PROCEDURE — 2580000003 HC RX 258: Performed by: NURSE PRACTITIONER

## 2021-04-16 PROCEDURE — 36415 COLL VENOUS BLD VENIPUNCTURE: CPT

## 2021-04-16 PROCEDURE — 83735 ASSAY OF MAGNESIUM: CPT

## 2021-04-16 PROCEDURE — 97110 THERAPEUTIC EXERCISES: CPT

## 2021-04-16 PROCEDURE — 49440 PLACE GASTROSTOMY TUBE PERC: CPT

## 2021-04-16 PROCEDURE — 94761 N-INVAS EAR/PLS OXIMETRY MLT: CPT

## 2021-04-16 PROCEDURE — 6360000004 HC RX CONTRAST MEDICATION: Performed by: SURGERY

## 2021-04-16 PROCEDURE — 82947 ASSAY GLUCOSE BLOOD QUANT: CPT

## 2021-04-16 PROCEDURE — 85025 COMPLETE CBC W/AUTO DIFF WBC: CPT

## 2021-04-16 PROCEDURE — 85610 PROTHROMBIN TIME: CPT

## 2021-04-16 PROCEDURE — 80053 COMPREHEN METABOLIC PANEL: CPT

## 2021-04-16 PROCEDURE — 2000000000 HC ICU R&B

## 2021-04-16 PROCEDURE — 95720 EEG PHY/QHP EA INCR W/VEEG: CPT | Performed by: PSYCHIATRY & NEUROLOGY

## 2021-04-16 PROCEDURE — C1894 INTRO/SHEATH, NON-LASER: HCPCS

## 2021-04-16 PROCEDURE — 85730 THROMBOPLASTIN TIME PARTIAL: CPT

## 2021-04-16 PROCEDURE — 82140 ASSAY OF AMMONIA: CPT

## 2021-04-16 PROCEDURE — 82248 BILIRUBIN DIRECT: CPT

## 2021-04-16 PROCEDURE — 0DH63UZ INSERTION OF FEEDING DEVICE INTO STOMACH, PERCUTANEOUS APPROACH: ICD-10-PCS | Performed by: RADIOLOGY

## 2021-04-16 PROCEDURE — 82803 BLOOD GASES ANY COMBINATION: CPT

## 2021-04-16 PROCEDURE — 2700000000 HC OXYGEN THERAPY PER DAY

## 2021-04-16 PROCEDURE — 6360000002 HC RX W HCPCS: Performed by: RADIOLOGY

## 2021-04-16 PROCEDURE — 95714 VEEG EA 12-26 HR UNMNTR: CPT

## 2021-04-16 PROCEDURE — 94003 VENT MGMT INPAT SUBQ DAY: CPT

## 2021-04-16 PROCEDURE — 84295 ASSAY OF SERUM SODIUM: CPT

## 2021-04-16 PROCEDURE — 83690 ASSAY OF LIPASE: CPT

## 2021-04-16 PROCEDURE — 2709999900 HC NON-CHARGEABLE SUPPLY

## 2021-04-16 PROCEDURE — 83605 ASSAY OF LACTIC ACID: CPT

## 2021-04-16 PROCEDURE — 71045 X-RAY EXAM CHEST 1 VIEW: CPT

## 2021-04-16 PROCEDURE — 70450 CT HEAD/BRAIN W/O DYE: CPT

## 2021-04-16 PROCEDURE — 3E0G76Z INTRODUCTION OF NUTRITIONAL SUBSTANCE INTO UPPER GI, VIA NATURAL OR ARTIFICIAL OPENING: ICD-10-PCS | Performed by: RADIOLOGY

## 2021-04-16 RX ORDER — LANSOPRAZOLE
30 KIT 2 TIMES DAILY
Qty: 300 ML | DISCHARGE
Start: 2021-04-16 | End: 2021-04-23

## 2021-04-16 RX ORDER — DEXTROSE AND SODIUM CHLORIDE 5; .45 G/100ML; G/100ML
INJECTION, SOLUTION INTRAVENOUS CONTINUOUS
Status: DISCONTINUED | OUTPATIENT
Start: 2021-04-16 | End: 2021-04-17 | Stop reason: HOSPADM

## 2021-04-16 RX ORDER — LACTULOSE 10 G/15ML
20 SOLUTION ORAL 3 TIMES DAILY
Refills: 1 | DISCHARGE
Start: 2021-04-16

## 2021-04-16 RX ORDER — FUROSEMIDE 20 MG/1
20 TABLET ORAL 2 TIMES DAILY
Qty: 60 TABLET | Refills: 3 | DISCHARGE
Start: 2021-04-16

## 2021-04-16 RX ORDER — INSULIN GLARGINE 100 [IU]/ML
30 INJECTION, SOLUTION SUBCUTANEOUS NIGHTLY
Status: CANCELLED | OUTPATIENT
Start: 2021-04-16

## 2021-04-16 RX ORDER — IPRATROPIUM BROMIDE AND ALBUTEROL SULFATE 2.5; .5 MG/3ML; MG/3ML
3 SOLUTION RESPIRATORY (INHALATION) EVERY 6 HOURS PRN
Qty: 360 ML | DISCHARGE
Start: 2021-04-16

## 2021-04-16 RX ORDER — FENTANYL CITRATE 50 UG/ML
INJECTION, SOLUTION INTRAMUSCULAR; INTRAVENOUS
Status: COMPLETED | OUTPATIENT
Start: 2021-04-16 | End: 2021-04-16

## 2021-04-16 RX ORDER — MAGNESIUM SULFATE IN WATER 40 MG/ML
2000 INJECTION, SOLUTION INTRAVENOUS ONCE
Status: COMPLETED | OUTPATIENT
Start: 2021-04-16 | End: 2021-04-16

## 2021-04-16 RX ORDER — DEXTROSE MONOHYDRATE 50 MG/ML
INJECTION, SOLUTION INTRAVENOUS CONTINUOUS
Status: DISCONTINUED | OUTPATIENT
Start: 2021-04-16 | End: 2021-04-17 | Stop reason: HOSPADM

## 2021-04-16 RX ADMIN — IBUPROFEN 400 MG: 200 SUSPENSION ORAL at 04:11

## 2021-04-16 RX ADMIN — LACTULOSE 20 G: 10 SOLUTION ORAL at 10:49

## 2021-04-16 RX ADMIN — DEXTROSE AND SODIUM CHLORIDE 1000 ML: 5; 450 INJECTION, SOLUTION INTRAVENOUS at 11:00

## 2021-04-16 RX ADMIN — ACETAMINOPHEN 1000 MG: 650 SOLUTION ORAL at 10:49

## 2021-04-16 RX ADMIN — DEXTROSE AND SODIUM CHLORIDE 1000 ML: 5; 450 INJECTION, SOLUTION INTRAVENOUS at 23:07

## 2021-04-16 RX ADMIN — SODIUM CHLORIDE, PRESERVATIVE FREE 10 ML: 5 INJECTION INTRAVENOUS at 20:29

## 2021-04-16 RX ADMIN — BACITRACIN: 500 OINTMENT TOPICAL at 10:51

## 2021-04-16 RX ADMIN — LACTULOSE 20 G: 10 SOLUTION ORAL at 20:32

## 2021-04-16 RX ADMIN — IOVERSOL 15 ML: 741 INJECTION INTRA-ARTERIAL; INTRAVENOUS at 16:39

## 2021-04-16 RX ADMIN — CHLORHEXIDINE GLUCONATE 0.12% ORAL RINSE 15 ML: 1.2 LIQUID ORAL at 20:33

## 2021-04-16 RX ADMIN — ACETAMINOPHEN 1000 MG: 650 SOLUTION ORAL at 18:13

## 2021-04-16 RX ADMIN — CEFAZOLIN SODIUM 1000 MG: 1 INJECTION, SOLUTION INTRAVENOUS at 13:11

## 2021-04-16 RX ADMIN — BACITRACIN: 500 OINTMENT TOPICAL at 20:32

## 2021-04-16 RX ADMIN — Medication 30 MG: at 20:33

## 2021-04-16 RX ADMIN — SODIUM CHLORIDE 11.25 UNITS/HR: 9 INJECTION, SOLUTION INTRAVENOUS at 08:16

## 2021-04-16 RX ADMIN — SODIUM CHLORIDE, PRESERVATIVE FREE 10 ML: 5 INJECTION INTRAVENOUS at 10:51

## 2021-04-16 RX ADMIN — DEXTROSE MONOHYDRATE: 50 INJECTION, SOLUTION INTRAVENOUS at 11:00

## 2021-04-16 RX ADMIN — IBUPROFEN 400 MG: 200 SUSPENSION ORAL at 07:01

## 2021-04-16 RX ADMIN — ACETAMINOPHEN 1000 MG: 650 SOLUTION ORAL at 04:10

## 2021-04-16 RX ADMIN — ENOXAPARIN SODIUM 90 MG: 100 INJECTION SUBCUTANEOUS at 20:31

## 2021-04-16 RX ADMIN — MAGNESIUM SULFATE 2000 MG: 2 INJECTION INTRAVENOUS at 11:10

## 2021-04-16 RX ADMIN — IBUPROFEN 400 MG: 200 SUSPENSION ORAL at 23:39

## 2021-04-16 RX ADMIN — IBUPROFEN 400 MG: 200 SUSPENSION ORAL at 10:50

## 2021-04-16 RX ADMIN — IBUPROFEN 400 MG: 200 SUSPENSION ORAL at 18:13

## 2021-04-16 RX ADMIN — FENTANYL CITRATE 50 MCG: 50 INJECTION, SOLUTION INTRAMUSCULAR; INTRAVENOUS at 16:18

## 2021-04-16 RX ADMIN — SPIRONOLACTONE 50 MG: 25 TABLET ORAL at 10:49

## 2021-04-16 ASSESSMENT — PAIN SCALES - GENERAL
PAINLEVEL_OUTOF10: 2
PAINLEVEL_OUTOF10: 0
PAINLEVEL_OUTOF10: 2
PAINLEVEL_OUTOF10: 0

## 2021-04-16 ASSESSMENT — PULMONARY FUNCTION TESTS
PIF_VALUE: 20
PIF_VALUE: 177
PIF_VALUE: 25

## 2021-04-16 NOTE — PLAN OF CARE
Problem: OXYGENATION/RESPIRATORY FUNCTION  Goal: Patient will maintain patent airway  Outcome: Ongoing  Goal: Patient will achieve/maintain normal respiratory rate/effort  Description: Respiratory rate and effort will be within normal limits for the patient  Outcome: Ongoing     Problem: MECHANICAL VENTILATION  Goal: Patient will maintain patent airway  Outcome: Ongoing  Goal: Oral health is maintained or improved  Outcome: Ongoing  Goal: ET tube will be managed safely  Outcome: Ongoing  Goal: Ability to express needs and understand communication  Outcome: Ongoing  Goal: Mobility/activity is maintained at optimum level for patient  Outcome: Ongoing     Problem: SKIN INTEGRITY  Goal: Skin integrity is maintained or improved  Outcome: Ongoing     Problem: Falls - Risk of:  Goal: Will remain free from falls  Description: Will remain free from falls  Outcome: Ongoing  Goal: Absence of physical injury  Description: Absence of physical injury  Outcome: Ongoing     Problem: Confusion - Acute:  Goal: Absence of continued neurological deterioration signs and symptoms  Description: Absence of continued neurological deterioration signs and symptoms  Outcome: Ongoing  Goal: Mental status will be restored to baseline  Description: Mental status will be restored to baseline  Outcome: Ongoing     Problem: Discharge Planning:  Goal: Ability to perform activities of daily living will improve  Description: Ability to perform activities of daily living will improve  Outcome: Ongoing  Goal: Participates in care planning  Description: Participates in care planning  Outcome: Ongoing  Goal: Discharged to appropriate level of care  Description: Discharged to appropriate level of care  Outcome: Ongoing     Problem: Injury - Risk of, Physical Injury:  Goal: Will remain free from falls  Description: Will remain free from falls  Outcome: Ongoing  Goal: Absence of physical injury  Description: Absence of physical injury  Outcome: Ongoing

## 2021-04-16 NOTE — PROGRESS NOTES
DECISION MAKING AND PLAN      1. Neuro  1. Pain & Sedation  1. Tylenol 1000mg TID  2. Motrin 400 q4  2. Multiple Small Scattered Intraparenchymal Hemorrhage, SAH, Left Parietal Depressed Skull Fracture  1. ICP monitoring  1. [place] ICP monitor placed on 4/9  2. Removed on 4/12  2. Completed course of Keppra BID for 7 days  3. Goal SBP <160  4. Off propranolol   5. MRI brain and Neck 4/13  1. Multiple punctate areas of restricted diffusion with associated signal abnormality on the FLAIR and T2-weighted images as well as hemorrhagic lesions on the gradient echo images. These findings are suggestive of diffuse axonal injury in the setting of trauma. There is subarachnoid hemorrhage layering in the occipital lobes bilaterally and hemorrhage layering in the atria of the lateral ventricles bilaterally. Depressed left parietal skull fracture. Bilateral opacification of the mastoid air cells. 6. CT Head/Cervical Spine  1. When therapeutic on heparin  7. LTME 4/14:  1. evidence of severe diffuse encephalopathy. 3. GCS 6 NT   1. Withdraws to pain-4  2. NT verbal  3. 2 eye  2. CV  1. HR 71-79  2. Hypotension  1. Off Levophed infusion  3. MAP 48-80  4. Lactate 1.10 (1.25) (0.84) (1.10) (0.79) (0.66) (0.80)   5. Off hydralazine  6. Off propanolol   3. Heme  1. Hgb: 7.4 (8.0) (5.7) (8.1) (8.6) (7.8) (6.6) (7.0) (7.4)(6.9) (7.3) (8.5) (10.4) (12.4)  1. 5.7 believed to be false, repeat H&&H 8.0   2. Transfused 1 unit pRBC 4/9  3. Transfused 1u pRBC 4/11  2. Plts: 62 (71) (89) (113) (64) (67) (64) (72) (51) (62) (50) (104)  1. Received 4 units 4/9  3. INR/PTT: 1.1/20.9 on 4/6  1. PTT 31.1 4/15/21 @ 0041  2. INR 1.1 4/16/21  3. PTT 58.3 4/16/21  4. AC Hx: Not known  5. Blood products administered:   1. 2 unit pRBC  2. 4 units platelets  6. Bilateral LE Ultrasound:  1. Left:  1. Acute deep venous thrombosis identified in the below knee gastrocnemius  vein. Thrombus noted around central line in common femoral vein.   Superficial venous thrombosis identified in the great saphenous vein. 2. Heparin infusion ; will hold 4 hrs prior to IR PEG  3. Repeat CTH at 24 hrs of anticoagulation  2. Right: negative  3. Repeat ultrasound DVT  7. DVT prophylaxis: Heparin infusion; hold 4 hours prior to IR PEG. 8. Hem/Onc consult:  1. Believed thrombocytopenia due to liver cirrhosis  9. Pancytopenia  1. WBC: 3.5 (5.5) (4.3) (6.2)(2.6) (2.3)  2. Hgb: 7.4 (8.0) (5.7)(8.1) (8.6) (7.8) (6.6) (7.0) (7.4)(6.9) (7.3) (8.5) (10.4) (12.4)  1. 5.7 believed to be false, repeat H&H 8.0  3. Plts: 62 (71) (89) (113) (64) (67) (64) (72) (51) (62) (50) (104)  4. Discontinued pepcid 4/10  4. Pulmonary  1. Ventilator Settings:  1. Mode: PRVC  1. RR: 18   2. TV: 600  1. 8cc/kg  3. PEEP: 14  4. FiO2: 40--drop to 30%  2. AB. 7.434 pH  2. 141.3 pO2  3. 37.9 pCO2  4. 25.4 pHCO3  5. 1.10 Lactic acid  6. PF ratio: 353  3. Trach plan  1. Trach on 4/15  1. 8-0 Shiley   2. PEEP at 14 currently  3. Wean FiO2 as tolerated to 30%  4. P/F: 353  5. CXR: stable  5. Renal/Electrolytes  1. HyperNatremia  1. 153 (150)  2. 300 q4hr free water  3. Down trending  2. Lasix BID 20  1. On hold  3. Spironolactone daily 50  4. BUN/Cr:  46/1.14 (41/0.96) (42/0.88)  (32/0.90) (29/0.70)  (21/0.63)  1. BUN/Cr ratio: 40.4 (42.7) (48)  5. Potasium 4.1 ; Cl 123 ; CO2 22 ; Mg 2.8 ; Ca 7.9 ; Phos 3.2  6. LR @ 0-125  1. Total fluids cap 125  7. I/Os: 3360/2290 = +1070  8. Urine Output:  1. 0.5 cc/kg/hr 24 hrs  2. 0.6 cc/kg/hr 8 hrs  9. +9787 since admisison  6. GI  1. Diet: TF  1. TF at goal  2. Pancreatitis   1. Lipase not elevated  2. ALT 42 (47)  3. AST 73 (102) (98) (92)   4. Total bilirubin: 1.32 (2.14)  1. Direct bilirubin pending   3. Hyperammonemia   1. 49 @  (64) (105)  2. Ammonia level daily till down   3. Ammonia pending    7. ID  1. Neuro fever  1. Tmax 24 hrs: 100.9  1. Tylenol 1000mg TID  2. Motrin 400 q4  2. Wbc 3.5 (5.5) (4.3) (6.2)   3. procal  0.23 @  (0.32)  4.  No abx--monitor  5. MRI brain and neck   1. Multiple punctate areas of restricted diffusion with associated signal abnormality on the FLAIR and T2-weighted images as well as hemorrhagic lesions on the gradient echo images. These findings are suggestive of diffuse axonal injury in the setting of trauma. There is subarachnoid hemorrhage layering in the occipital lobes bilaterally and hemorrhage layering in the atria of the lateral ventricles bilaterally. Depressed left parietal skull fracture. Bilateral opacification of the mastoid air cells. 2. No cervical spine acute processes  6. B/l DVT doppler study  1. Acute deep venous thrombosis identified in the below knee gastrocnemius  vein. Thrombus noted around central line in common femoral vein. Superficial venous thrombosis identified in the great saphenous vein. 7. If mental status change---MRI concern for menigitis   1. Multiple punctate areas of restricted diffusion with associated signal abnormality on the FLAIR and T2-weighted images as well as hemorrhagic lesions on the gradient echo images. These findings are suggestive of diffuse axonal injury in the setting of trauma. There is subarachnoid hemorrhage layering in the occipital lobes bilaterally and hemorrhage layering in the atria of the lateral ventricles bilaterally. Depressed left parietal skull fracture. Bilateral opacification of the mastoid air cells. 8. Lipase and lfts for inflam process   8. MSK  1. Right Femur Fracture, Left 5th Metatarsal Fracture  1. Following Orthopedic Surgery recommendations  2. Antibiotics per orthopedics for surgery  3. WBAT to right lower extremity  4. WBAT to left lower extremity  5. Hard sole shoe to LLE  6. PT/OT  7. Ice & elevate for pain/swelling  8. Dressing change per ortho, nursing can reinforce  9. Endocrine  1. Glucose: 137-266  2. Insulin gtt  1. Required 80u over past 24 hrs  10. Skin  1. Bacitracin ointment  11. Lines  1. PIV   2.  Arterial Line L Fem  3. NG  4. Trach  5. FMS  12. Prophylaxis  1. GI prophylaxis: Lansoprazole  2. DVT prophylaxis: Heparin infusion  13. Diet  1. TF  14. Disposition  1. Remain in ICU      Overall plan  Lipase and LFTs trending  IR peg today; hold heparin infusion 4 hours prior  Trach   procal - not significantly elevated  Febrile with increasing fever--likely neurogenic   Ammonia level - wnl  Insulin ggt continue  Hold lasix   Heparin drip- hold for IR PEG  Lansoprazole     IR PEG  Holding Heparin for IR procedure  Free water to 300q4 - hold. Will address tomorrow. Added D5 water @ 30cc  Dc LR; switch to D5 / NS 1-125  2g Mag  Holding TF for PEG  Repeat DVT ultrasound  Decreased PEEP to 10, ABG 2 hours later. CHECKLIST    CAM-ICU RASS: -3  RESTRAINTS: none  IVF: 0-125 LR  NUTRITION: TF on hold  ANTIBIOTICS: per OR  GI: lansoprazole   DVT: heparin on hold  GLYCEMIC CONTROL: insulin gtt  HOB >45: yes  MOBILITY: PT/OT after surgery  SBT: na  IS: na    SUBJECTIVE      Febrile overnight. GCS 6NT today; withdraws to pain; eye opening to pain. No acute issues overnight. Patient is to receive IR PEG today after holding heparin infusion. OBJECTIVE  VITALS: Temp: Temp: 100 °F (37.8 °C)Temp  Av.1 °F (37.8 °C)  Min: 99.2 °F (37.3 °C)  Max: 100.9 °F (32.5 °C) BP Systolic (29UGL), WYZ:676 , Min:125 , HIM:781   Diastolic (93EED), DEJA:92, Min:31, Max:62   Pulse Pulse  Av.5  Min: 71  Max: 84 Resp Resp  Av.8  Min: 14  Max: 26 Pulse ox SpO2  Av.5 %  Min: 98 %  Max: 100 %    Physical Exam  Vitals reviewed: Limited due to intubation and sedation. Constitutional:       Appearance: He is obese. Comments: Elderly white male   HENT:      Head: Normocephalic. Comments: Incision to scalp     Right Ear: External ear normal.      Left Ear: External ear normal.      Nose:      Comments: NG in place     Mouth/Throat:      Comments: ETT in place  Eyes:      General: No scleral icterus.         Right eye: No discharge. Left eye: No discharge. Pupils: Pupils are equal, round, and reactive to light. Cardiovascular:      Rate and Rhythm: Normal rate and regular rhythm. Pulses: Normal pulses. Heart sounds: Normal heart sounds. No murmur. No friction rub. No gallop. Pulmonary:      Effort: Pulmonary effort is normal. No respiratory distress. Breath sounds: Normal breath sounds. No stridor. No wheezing, rhonchi or rales. Chest:      Chest wall: No tenderness. Abdominal:      General: Abdomen is flat. There is no distension. Palpations: Abdomen is soft. Tenderness: There is no abdominal tenderness. There is no guarding. Musculoskeletal:         General: Deformity present. Comments: Covered surgical incision to right thigh  Boot in place LLE   Skin:     Findings: Bruising present. No rash. Comments: Scattered bruising to face, bilateral lower extremities  Scattered abrasions to face and lower extremities   Neurological:      Mental Status: He is disoriented.       Comments: GCS 5NT  Withdraws to pain in all 4 extremities  Verbal NT  Opens eyes to pain              LAB:  CBC:   Recent Labs     04/15/21  0615 04/15/21  0700 04/16/21  0518   WBC 5.5 5.1 3.5   HGB 5.7* 7.7*  8.0* 7.4*   HCT 19.1* 26.0*  26.6* 25.1*   .7* 105.3* 107.7*   PLT 71* 70* 62*     BMP:   Recent Labs     04/14/21  0611 04/15/21  0615 04/16/21  0518   * 150* 153*   K 4.1 3.9 4.1   * 121* 123*   CO2 28 23 22   BUN 42* 41* 46*   CREATININE 0.88 0.96 1.14   GLUCOSE 139* 127* 266*         RADIOLOGY:  CXR:   EXAMINATION:   ONE XRAY VIEW OF THE CHEST       4/9/2021 6:16 am       COMPARISON:   8 April 2021       HISTORY:   ORDERING SYSTEM PROVIDED HISTORY: intubated   TECHNOLOGIST PROVIDED HISTORY:   intubated   Reason for Exam: portable supine/ intubated   Acuity: Acute   Type of Exam: Ongoing       FINDINGS:   AP portable view of the chest time stamped at 555 hours demonstrates overlying cardiac monitoring electrodes.  Endotracheal tube terminates 1.4 cm   above the michael, low lying.  Intestinal tube extends below the diaphragm,   tip out of the field of view.  There is no significant change and left   basilar opacity likely combination of left effusion and atelectasis.  Right   lung is clear.  Heart size is stable.  No extrapleural air.  Osseous   structures are stable. XR CHEST PORTABLE [[id number]]    Collected: 04/10/21 0622    Updated: 04/10/21 0805    Narrative:     EXAMINATION:   ONE XRAY VIEW OF THE CHEST     4/10/2021 6:18 am     COMPARISON:   9 April 2021     HISTORY:   ORDERING SYSTEM PROVIDED HISTORY: intubated   TECHNOLOGIST PROVIDED HISTORY:   intubated   Reason for Exam: Supine port, intubation     FINDINGS:   AP portable view of the chest time stamped at 548 hours demonstrates an   endotracheal tube terminating 1.6 cm above the michael, low lying, and an   intestinal tube extending beyond the body of the stomach, tip out of the   field of view.  Heart size is stable.  The patient has continued left   effusion and basilar atelectasis.  Vascularity is top normal.    Impression:     Low lying endotracheal tube.  Continued left effusion and probable   atelectasis.  Cardiomegaly is stable.  Central vasculature is top normal.     RECOMMENDATION:   Advise retraction of ET tube by 2-3 cm. The findings were sent to the Radiology Results [address] at 8:02   am on 4/10/2021to be communicated to a licensed caregiver. EXAMINATION:   ONE XRAY VIEW OF THE CHEST       4/11/2021 8:05 am       COMPARISON:   04/10/2021       HISTORY:   ORDERING SYSTEM PROVIDED HISTORY: intubated   TECHNOLOGIST PROVIDED HISTORY:   intubated   Reason for Exam: supine portable, intubated       FINDINGS:   Endotracheal tube terminates 2.8 cm above the michael.  Enteric tube courses   below the left hemidiaphragm.  Cardiac silhouette is borderline prominent.    Trace bilateral effusions and generalized interstitial prominence.  No   evidence for pneumothorax.  Osseous structures and soft tissues are grossly   intact. EXAMINATION:   ONE XRAY VIEW OF THE CHEST       4/13/2021 7:22 am       COMPARISON:   April 12, 2021       HISTORY:   ORDERING SYSTEM PROVIDED HISTORY: intubated   TECHNOLOGIST PROVIDED HISTORY:   intubated   Reason for Exam: supine port   Acuity: Acute   Type of Exam: Subsequent/Follow-up       FINDINGS:   No evidence of pneumothorax. Philip Motto is continued small to moderate left-sided   pleural effusion.  ET tube is in good position with tip just below the   michael.  NG tube courses below the diaphragm.  Right lung field is clear. There may be some left basilar infiltrate or atelectasis.  Heart silhouette   is stable.  Visualized bony thorax shows no acute abnormality.      EXAMINATION:   ONE XRAY VIEW OF THE CHEST       4/13/2021 4:07 pm       COMPARISON:   April 13, 2021, chest exam       HISTORY:   ORDERING SYSTEM PROVIDED HISTORY: Central line placement   TECHNOLOGIST PROVIDED HISTORY:   Central line placement   Reason for Exam: Central line placement       FINDINGS:   Interval insertion of a right IJ catheter, tip projected at the superior vena   cava.  Stable satisfactorily aligned endotracheal/nasogastric tubes.  Stable   cardiopericardial silhouette       No pneumothorax.  No new parenchymal findings.  Stable left basilar opacity               Helena Reynolds MD  4/9/21, 11:29 AM

## 2021-04-16 NOTE — PROGRESS NOTES
Neurosurgery [facility]/Resident    Daily Progress Note   CC:No chief complaint on file. 4/16/2021  9:49 AM    Chart reviewed. No acute events overnight. No new complaints. Patient seen and examined this morning. PTT 58.3, T-max 37.8. Vitals:    04/16/21 0500 04/16/21 0600 04/16/21 0700 04/16/21 0811   BP: (!) 168/32 (!) 170/41 (!) 165/32    Pulse: 78 79 76 75   Resp: 21 21 21 20   Temp:       TempSrc:       SpO2: 100% 100% 100% 100%   Weight:  252 lb 3.3 oz (114.4 kg)     Height:           PE:   E2 V1 TM5  Trach in place  PERRL 3mm brisk   Localizing to painful stimulation of the left upper extremity  No movement to right upper extremity  Withdrawing to pain to bilateral lower extremity          Lab Results   Component Value Date    WBC 3.5 04/16/2021    HGB 7.4 (L) 04/16/2021    HCT 25.1 (L) 04/16/2021    PLT 62 (L) 04/16/2021    TRIG 128 04/10/2021    ALT 42 (H) 04/16/2021    AST 73 (H) 04/16/2021     (H) 04/16/2021    K 4.1 04/16/2021     (H) 04/16/2021    CREATININE 1.14 04/16/2021    BUN 46 (H) 04/16/2021    CO2 22 04/16/2021    INR 1.1 04/16/2021    LABA1C 6.3 (H) 04/12/2021       Radiology   Ct Head Wo Contrast    Result Date: 4/16/2021  EXAMINATION: CT OF THE HEAD WITHOUT CONTRAST,  4/16/2021 9:06 am TECHNIQUE: CT of the head was performed without the administration of intravenous contrast. Dose modulation, iterative reconstruction, and/or weight based adjustment of the mA/kV was utilized to reduce the radiation dose to as low as reasonably achievable. COMPARISON: 15 April 2021 HISTORY: ORDERING SYSTEM PROVIDED HISTORY:  Follow up therapeutic PTT TECHNOLOGIST PROVIDED HISTORY: Follow up therapeutic PTT Reason for Exam: follow up therapeutic PTT Acuity: Unknown Type of Exam: Unknown FINDINGS: BRAIN/VENTRICLES: The foci of intraparenchymal hemorrhage in the subcortical white matter of both hemispheres are redemonstrated, slightly less dense and slightly smaller than on prior study.   The subarachnoid hemorrhage along the parafalcine areas in the posterior parietal location. Hemorrhage is redemonstrated along the dependent aspects of both lateral ventricles. No midline shift is noted. No hydrocephalus. No intra-extra conal mass lesions are noted. ORBITS: The visualized portion of the orbits demonstrate no acute abnormality. SINUSES: Mild mucoperiosteal thickening is noted in the right ethmoid and maxillary sinuses. There is also mucoperiosteal thickening in the frontal sinuses, mild. Mastoid air cells are appropriately aerated. SOFT TISSUES/SKULL:  No acute abnormality of the visualized skull or soft tissues. 1.  Stable intraparenchymal hemorrhages. 2.  Subarachnoid hemorrhage in the interhemispheric anemia is stable. 3.  Stable hemorrhage in the ventricles without appreciable midline shift. No ventriculomegaly. A/P  66 y.o. male who presents with small scattered intraparenchymal hemorrhages, subarachnoid hemorrhage, left parietal depressed skull fracture    Repeat CT head today while PTT therapeutic shows stable IPH and SAH  Follow up in 2-4 weeks with repeat CT head        Please contact neurosurgery with any changes in patients neurologic status.        Luda Paulino, CNP  4/16/21  9:49 AM

## 2021-04-16 NOTE — SEDATION DOCUMENTATION
g- tube placed to left upper quad withoutdistress  Ok to use in 24 hrs  NG to low suction over night

## 2021-04-16 NOTE — CARE COORDINATION
Update provided to Drew Alvarado at [facility]. They are able to accept when pt is ready    1800 notified [facility] of discharge order. They are able to accept pt tomorrow morning. Transportation arranged for 10am via [facility]. PS Dr Pastor Krishnan to provide update.  Christin Sullivan, wife, updated and agreeable with plan

## 2021-04-16 NOTE — FLOWSHEET NOTE
DATE: 2021  NAME: Epi Feldman  MRN: [id number]   : 1950    Discharge Recommendations: Continue to Assess (pending progress)     Subjective: RN ok'd ROM  Pain: [place], no facial grimacing  Patient follows: No Commands  Is patient on ventilator: Yes  Is patient on sedation: No  Precautions: LUE wrist restraint, EEG    Therapeutic exercises:  UE/LE(s)  Bilateral Passive range of motion all planes x 10 reps  bilateral gastrocnemius stretching 3 reps x 30 seconds    Goals  Short Term Goals  Short term goal 1: prevent contractures x 4  Short term goal 2: facilitate active movement x 4  Short term goal 3: mobilize pt when appropriate and set goals          Plan: Progress functional mobility as medically appropriate.    Time In: 1230   Time Out: 1245  Time Coded Minutes (treatment minutes): 15 mins  Rehab Potential:B[place]  Treatments/week: 2-3x per wk     Josselin Tobin, PTA

## 2021-04-16 NOTE — PROGRESS NOTES
Occupational Therapy    Occupational Therapy Not Seen Note    DATE: 2021  Name: Rob Damon  : 1950  MRN: [id number]    Patient not available for Occupational Therapy due to:    Per RN, patient is not appropriate for OOB activity at this time d/t not following commands. Pt has trach/peg, not currently sedated.     Next Scheduled Treatment: Will check back as able     Electronically signed by Edelmira Lara OT on 2021 at 9:53 AM

## 2021-04-16 NOTE — PLAN OF CARE
Problem: OXYGENATION/RESPIRATORY FUNCTION  Goal: Patient will maintain patent airway  4/15/2021 1940 by Kina Anton RN  Outcome: Ongoing  4/15/2021 1713 by Keyana Trujillo RN  Outcome: Ongoing  4/15/2021 1447 by Eliu Hurst RCP  Outcome: Ongoing  Goal: Patient will achieve/maintain normal respiratory rate/effort  Description: Respiratory rate and effort will be within normal limits for the patient  4/15/2021 1713 by Keyana Trujillo RN  Outcome: Ongoing  4/15/2021 1447 by Eliu Hurst RCP  Outcome: Ongoing  Note:   [address] SP02/ABG'S    [x]  IDENTIFY APPROPRIATE OXYGEN THERAPY  [x]   MONITOR SP02/ABG'S AS NEEDED   [x]   PATIENT EDUCATION AS NEEDED        Problem: MECHANICAL VENTILATION  Goal: Patient will maintain patent airway  4/15/2021 1713 by Keyana Trujillo RN  Outcome: Ongoing  4/15/2021 1447 by Eliu Hurst RCP  Outcome: Ongoing  Note: MECHANICAL VENTILATION     [x]  PROVIDE OPTIMAL VENTILATION  [x]   ASSESS FOR EXTUBATION READINESS  [x]   ASSESS FOR WEANING READINESS  [x]  EXTUBATE AS TOLERATED  [x]  IMPLEMENT ADULT MECHANICAL VENTILATION PROTOCOL  [x]  MAINTAIN ADEQUATE OXYGENATION  [x]  PERFORM SPONTANEOUS WEANING TRIAL AS TOLERATED     Goal: Oral health is maintained or improved  4/15/2021 1713 by Keyana Trujillo RN  Outcome: Ongoing  4/15/2021 1447 by Eliu Hurst RCP  Outcome: Ongoing  Goal: ET tube will be managed safely  4/15/2021 1713 by Keyana Trujillo RN  Outcome: Ongoing  4/15/2021 1447 by Eliu Hurst RCP  Outcome: Ongoing  Goal: Ability to express needs and understand communication  4/15/2021 1713 by Keyana Trujillo RN  Outcome: Ongoing  4/15/2021 1447 by Eliu Hurst RCP  Outcome: Ongoing  Goal: Mobility/activity is maintained at optimum level for patient  4/15/2021 1940 by Kina Anton RN  Outcome: Ongoing  4/15/2021 1713 by Keyana Trujillo RN  Outcome: Ongoing  4/15/2021 1447 by Eliu Hurst RCP  Outcome: Ongoing     Problem: SKIN INTEGRITY  Goal: Skin integrity is maintained or improved  4/15/2021 1940 by Lora Saha RN  Outcome: Ongoing  4/15/2021 1713 by Sara Ahumada RN  Outcome: Ongoing  4/15/2021 1447 by Aniya Pink RCP  Outcome: Ongoing

## 2021-04-17 ENCOUNTER — APPOINTMENT (OUTPATIENT)
Dept: GENERAL RADIOLOGY | Age: 71
DRG: 003 | End: 2021-04-17
Payer: MEDICARE

## 2021-04-17 VITALS
OXYGEN SATURATION: 96 % | HEART RATE: 67 BPM | BODY MASS INDEX: 35.03 KG/M2 | TEMPERATURE: 100.2 F | RESPIRATION RATE: 18 BRPM | HEIGHT: 71 IN | DIASTOLIC BLOOD PRESSURE: 28 MMHG | SYSTOLIC BLOOD PRESSURE: 140 MMHG | WEIGHT: 250.22 LBS

## 2021-04-17 LAB
ABSOLUTE EOS #: 0.03 K/UL (ref 0–0.4)
ABSOLUTE EOS #: 0.05 K/UL (ref 0–0.44)
ABSOLUTE IMMATURE GRANULOCYTE: 0 K/UL (ref 0–0.3)
ABSOLUTE IMMATURE GRANULOCYTE: 0.03 K/UL (ref 0–0.3)
ABSOLUTE LYMPH #: 0.38 K/UL (ref 1.1–3.7)
ABSOLUTE LYMPH #: 0.45 K/UL (ref 1–4.8)
ABSOLUTE MONO #: 0.25 K/UL (ref 0.1–0.8)
ABSOLUTE MONO #: 0.27 K/UL (ref 0.1–1.2)
ALBUMIN SERPL-MCNC: 1.9 G/DL (ref 3.5–5.2)
ALBUMIN/GLOBULIN RATIO: 0.7 (ref 1–2.5)
ALLEN TEST: NORMAL
ALP BLD-CCNC: 78 U/L (ref 40–129)
ALT SERPL-CCNC: 36 U/L (ref 5–41)
ANION GAP SERPL CALCULATED.3IONS-SCNC: 6 MMOL/L (ref 9–17)
AST SERPL-CCNC: 57 U/L
BASOPHILS # BLD: 0 % (ref 0–2)
BASOPHILS # BLD: 0 % (ref 0–2)
BASOPHILS ABSOLUTE: 0 K/UL (ref 0–0.2)
BASOPHILS ABSOLUTE: 0 K/UL (ref 0–0.2)
BILIRUB SERPL-MCNC: 1.33 MG/DL (ref 0.3–1.2)
BUN BLDV-MCNC: 44 MG/DL (ref 8–23)
BUN/CREAT BLD: ABNORMAL (ref 9–20)
CALCIUM SERPL-MCNC: 7.9 MG/DL (ref 8.6–10.4)
CHLORIDE BLD-SCNC: 121 MMOL/L (ref 98–107)
CO2: 22 MMOL/L (ref 20–31)
CREAT SERPL-MCNC: 1.01 MG/DL (ref 0.7–1.2)
DIFFERENTIAL TYPE: ABNORMAL
DIFFERENTIAL TYPE: ABNORMAL
EOSINOPHILS RELATIVE PERCENT: 1 % (ref 1–4)
EOSINOPHILS RELATIVE PERCENT: 2 % (ref 1–4)
FIO2: 30
GFR AFRICAN AMERICAN: >60 ML/MIN
GFR NON-AFRICAN AMERICAN: >60 ML/MIN
GFR SERPL CREATININE-BSD FRML MDRD: ABNORMAL ML/MIN/{1.73_M2}
GFR SERPL CREATININE-BSD FRML MDRD: ABNORMAL ML/MIN/{1.73_M2}
GLUCOSE BLD-MCNC: 143 MG/DL (ref 75–110)
GLUCOSE BLD-MCNC: 143 MG/DL (ref 75–110)
GLUCOSE BLD-MCNC: 145 MG/DL (ref 75–110)
GLUCOSE BLD-MCNC: 151 MG/DL (ref 75–110)
GLUCOSE BLD-MCNC: 154 MG/DL (ref 75–110)
GLUCOSE BLD-MCNC: 154 MG/DL (ref 75–110)
GLUCOSE BLD-MCNC: 158 MG/DL (ref 75–110)
GLUCOSE BLD-MCNC: 160 MG/DL (ref 75–110)
GLUCOSE BLD-MCNC: 166 MG/DL (ref 75–110)
GLUCOSE BLD-MCNC: 178 MG/DL (ref 75–110)
GLUCOSE BLD-MCNC: 189 MG/DL (ref 75–110)
GLUCOSE BLD-MCNC: 199 MG/DL (ref 70–99)
HCT VFR BLD CALC: 22.6 % (ref 40.7–50.3)
HCT VFR BLD CALC: 22.6 % (ref 40.7–50.3)
HEMOGLOBIN: 6.6 G/DL (ref 13–17)
HEMOGLOBIN: 6.9 G/DL (ref 13–17)
IMMATURE GRANULOCYTES: 0 %
IMMATURE GRANULOCYTES: 1 %
INR BLD: 1.1
LIPASE: 38 U/L (ref 13–60)
LYMPHOCYTES # BLD: 14 % (ref 24–43)
LYMPHOCYTES # BLD: 16 % (ref 24–44)
MAGNESIUM: 2.9 MG/DL (ref 1.6–2.6)
MCH RBC QN AUTO: 31.6 PG (ref 25.2–33.5)
MCH RBC QN AUTO: 33 PG (ref 25.2–33.5)
MCHC RBC AUTO-ENTMCNC: 29.2 G/DL (ref 28.4–34.8)
MCHC RBC AUTO-ENTMCNC: 30.5 G/DL (ref 28.4–34.8)
MCV RBC AUTO: 108.1 FL (ref 82.6–102.9)
MCV RBC AUTO: 108.1 FL (ref 82.6–102.9)
MODE: NORMAL
MONOCYTES # BLD: 10 % (ref 3–12)
MONOCYTES # BLD: 9 % (ref 1–7)
MORPHOLOGY: ABNORMAL
NEGATIVE BASE EXCESS, ART: NORMAL (ref 0–2)
NRBC AUTOMATED: 1.1 PER 100 WBC
NRBC AUTOMATED: 1.8 PER 100 WBC
O2 DEVICE/FLOW/%: NORMAL
PATIENT TEMP: NORMAL
PDW BLD-RTO: 17.6 % (ref 11.8–14.4)
PDW BLD-RTO: 17.9 % (ref 11.8–14.4)
PHOSPHORUS: 3.4 MG/DL (ref 2.5–4.5)
PLATELET # BLD: 61 K/UL (ref 138–453)
PLATELET # BLD: 67 K/UL (ref 138–453)
PLATELET ESTIMATE: ABNORMAL
PLATELET ESTIMATE: ABNORMAL
PMV BLD AUTO: 11.8 FL (ref 8.1–13.5)
PMV BLD AUTO: 11.9 FL (ref 8.1–13.5)
POC HCO3: 23.8 MMOL/L (ref 21–28)
POC LACTIC ACID: 1.02 MMOL/L (ref 0.56–1.39)
POC O2 SATURATION: 98 % (ref 94–98)
POC PCO2 TEMP: NORMAL MM HG
POC PCO2: 35.7 MM HG (ref 35–48)
POC PH TEMP: NORMAL
POC PH: 7.43 (ref 7.35–7.45)
POC PO2 TEMP: NORMAL MM HG
POC PO2: 97.5 MM HG (ref 83–108)
POSITIVE BASE EXCESS, ART: 0 (ref 0–3)
POTASSIUM SERPL-SCNC: 3.7 MMOL/L (ref 3.7–5.3)
PROTHROMBIN TIME: 11.6 SEC (ref 9.1–12.3)
RBC # BLD: 2.09 M/UL (ref 4.21–5.77)
RBC # BLD: 2.09 M/UL (ref 4.21–5.77)
RBC # BLD: ABNORMAL 10*6/UL
RBC # BLD: ABNORMAL 10*6/UL
SAMPLE SITE: NORMAL
SEG NEUTROPHILS: 73 % (ref 36–65)
SEG NEUTROPHILS: 74 % (ref 36–66)
SEGMENTED NEUTROPHILS ABSOLUTE COUNT: 1.97 K/UL (ref 1.5–8.1)
SEGMENTED NEUTROPHILS ABSOLUTE COUNT: 2.07 K/UL (ref 1.8–7.7)
SODIUM BLD-SCNC: 149 MMOL/L (ref 135–144)
SODIUM BLD-SCNC: 151 MMOL/L (ref 135–144)
SODIUM BLD-SCNC: 152 MMOL/L (ref 135–144)
TCO2 (CALC), ART: 25 MMOL/L (ref 22–29)
TOTAL PROTEIN: 4.7 G/DL (ref 6.4–8.3)
WBC # BLD: 2.7 K/UL (ref 3.5–11.3)
WBC # BLD: 2.8 K/UL (ref 3.5–11.3)
WBC # BLD: ABNORMAL 10*3/UL
WBC # BLD: ABNORMAL 10*3/UL

## 2021-04-17 PROCEDURE — 86901 BLOOD TYPING SEROLOGIC RH(D): CPT

## 2021-04-17 PROCEDURE — 6370000000 HC RX 637 (ALT 250 FOR IP): Performed by: NURSE PRACTITIONER

## 2021-04-17 PROCEDURE — 93970 EXTREMITY STUDY: CPT

## 2021-04-17 PROCEDURE — 82947 ASSAY GLUCOSE BLOOD QUANT: CPT

## 2021-04-17 PROCEDURE — 94003 VENT MGMT INPAT SUBQ DAY: CPT

## 2021-04-17 PROCEDURE — 83735 ASSAY OF MAGNESIUM: CPT

## 2021-04-17 PROCEDURE — 2580000003 HC RX 258: Performed by: NURSE PRACTITIONER

## 2021-04-17 PROCEDURE — 95711 VEEG 2-12 HR UNMONITORED: CPT

## 2021-04-17 PROCEDURE — 84100 ASSAY OF PHOSPHORUS: CPT

## 2021-04-17 PROCEDURE — 86900 BLOOD TYPING SEROLOGIC ABO: CPT

## 2021-04-17 PROCEDURE — 6360000002 HC RX W HCPCS: Performed by: NURSE PRACTITIONER

## 2021-04-17 PROCEDURE — 37799 UNLISTED PX VASCULAR SURGERY: CPT

## 2021-04-17 PROCEDURE — 94761 N-INVAS EAR/PLS OXIMETRY MLT: CPT

## 2021-04-17 PROCEDURE — 2700000000 HC OXYGEN THERAPY PER DAY

## 2021-04-17 PROCEDURE — 82803 BLOOD GASES ANY COMBINATION: CPT

## 2021-04-17 PROCEDURE — 83690 ASSAY OF LIPASE: CPT

## 2021-04-17 PROCEDURE — P9040 RBC LEUKOREDUCED IRRADIATED: HCPCS

## 2021-04-17 PROCEDURE — 6370000000 HC RX 637 (ALT 250 FOR IP): Performed by: STUDENT IN AN ORGANIZED HEALTH CARE EDUCATION/TRAINING PROGRAM

## 2021-04-17 PROCEDURE — 36430 TRANSFUSION BLD/BLD COMPNT: CPT

## 2021-04-17 PROCEDURE — 84295 ASSAY OF SERUM SODIUM: CPT

## 2021-04-17 PROCEDURE — 86920 COMPATIBILITY TEST SPIN: CPT

## 2021-04-17 PROCEDURE — 71045 X-RAY EXAM CHEST 1 VIEW: CPT

## 2021-04-17 PROCEDURE — 85025 COMPLETE CBC W/AUTO DIFF WBC: CPT

## 2021-04-17 PROCEDURE — 83605 ASSAY OF LACTIC ACID: CPT

## 2021-04-17 PROCEDURE — 85610 PROTHROMBIN TIME: CPT

## 2021-04-17 PROCEDURE — 80053 COMPREHEN METABOLIC PANEL: CPT

## 2021-04-17 PROCEDURE — 86850 RBC ANTIBODY SCREEN: CPT

## 2021-04-17 PROCEDURE — 95720 EEG PHY/QHP EA INCR W/VEEG: CPT | Performed by: PSYCHIATRY & NEUROLOGY

## 2021-04-17 RX ORDER — SODIUM CHLORIDE 9 MG/ML
INJECTION, SOLUTION INTRAVENOUS PRN
Status: DISCONTINUED | OUTPATIENT
Start: 2021-04-17 | End: 2021-04-17 | Stop reason: HOSPADM

## 2021-04-17 RX ADMIN — ACETAMINOPHEN 1000 MG: 650 SOLUTION ORAL at 10:59

## 2021-04-17 RX ADMIN — Medication 30 MG: at 10:16

## 2021-04-17 RX ADMIN — IBUPROFEN 400 MG: 200 SUSPENSION ORAL at 11:00

## 2021-04-17 RX ADMIN — DEXTROSE AND SODIUM CHLORIDE 1000 ML: 5; 450 INJECTION, SOLUTION INTRAVENOUS at 10:24

## 2021-04-17 RX ADMIN — IBUPROFEN 400 MG: 200 SUSPENSION ORAL at 08:45

## 2021-04-17 RX ADMIN — BACITRACIN: 500 OINTMENT TOPICAL at 08:39

## 2021-04-17 RX ADMIN — ACETAMINOPHEN 1000 MG: 650 SOLUTION ORAL at 02:30

## 2021-04-17 RX ADMIN — SODIUM CHLORIDE, PRESERVATIVE FREE 10 ML: 5 INJECTION INTRAVENOUS at 08:44

## 2021-04-17 RX ADMIN — SPIRONOLACTONE 50 MG: 25 TABLET ORAL at 08:45

## 2021-04-17 RX ADMIN — IBUPROFEN 400 MG: 200 SUSPENSION ORAL at 03:30

## 2021-04-17 RX ADMIN — ENOXAPARIN SODIUM 90 MG: 100 INJECTION SUBCUTANEOUS at 08:40

## 2021-04-17 RX ADMIN — LACTULOSE 20 G: 10 SOLUTION ORAL at 08:45

## 2021-04-17 RX ADMIN — SODIUM CHLORIDE 3.32 UNITS/HR: 9 INJECTION, SOLUTION INTRAVENOUS at 00:43

## 2021-04-17 RX ADMIN — POTASSIUM BICARBONATE 30 MEQ: 782 TABLET, EFFERVESCENT ORAL at 08:44

## 2021-04-17 ASSESSMENT — ENCOUNTER SYMPTOMS: TACHYPNEA: 1

## 2021-04-17 ASSESSMENT — PULMONARY FUNCTION TESTS
PIF_VALUE: 15
PIF_VALUE: 17

## 2021-04-17 NOTE — FLOWSHEET NOTE
Patient discharged to [facility] with Kaycee Renee. At discharge, pt was on an insulin gtt and remained in LUE soft wrist restraint. Report called to [facility] at [facility]. All questions were answered.

## 2021-04-17 NOTE — PLAN OF CARE
Problem: OXYGENATION/RESPIRATORY FUNCTION  Goal: Patient will maintain patent airway  4/17/2021 0031 by Andrea Sommers RN  Outcome: Ongoing  4/16/2021 2009 by Marti Quintanilla RCP  Outcome: Ongoing  4/16/2021 1839 by Jose Marrero RN  Outcome: Ongoing  Goal: Patient will achieve/maintain normal respiratory rate/effort  Description: Respiratory rate and effort will be within normal limits for the patient  4/17/2021 0031 by Andrea Sommers RN  Outcome: Ongoing  4/16/2021 2009 by Marti Quintanilla RCP  Outcome: Ongoing  4/16/2021 1839 by Jose Marrero RN  Outcome: Ongoing     Problem: MECHANICAL VENTILATION  Goal: Patient will maintain patent airway  4/17/2021 0031 by Andrea Sommers RN  Outcome: Ongoing  4/16/2021 2009 by Marti Quintanilla RCP  Outcome: Ongoing  4/16/2021 1839 by Jose Marrero RN  Outcome: Ongoing  Goal: Oral health is maintained or improved  4/17/2021 0031 by Andrea Sommers RN  Outcome: Ongoing  4/16/2021 2009 by Marti Quintanilla RCP  Outcome: Ongoing  4/16/2021 1839 by Jose Marrero RN  Outcome: Ongoing  Goal: ET tube will be managed safely  4/16/2021 2009 by Marti Quintanilla RCP  Outcome: Completed  4/16/2021 1839 by Jose Marrero RN  Outcome: Ongoing  Goal: Ability to express needs and understand communication  4/17/2021 0031 by Andrea Sommers RN  Outcome: Ongoing  4/16/2021 2009 by Marti Quintanilla RCP  Outcome: Ongoing  4/16/2021 1839 by Jose Marrero RN  Outcome: Ongoing  Goal: Mobility/activity is maintained at optimum level for patient  4/17/2021 0031 by Andrea Sommers RN  Outcome: Ongoing  4/16/2021 2009 by Matri Quintanilla RCP  Outcome: Ongoing  4/16/2021 1839 by Jose Marrero RN  Outcome: Ongoing  Goal: Tracheostomy will be managed safely  4/17/2021 0031 by Andrea Sommers RN  Outcome: Ongoing  4/16/2021 2010 by Marti Quintainlla RCP  Outcome: Ongoing     Problem: SKIN INTEGRITY  Goal: Skin integrity is maintained or improved  4/17/2021 0031 by Guy Avila RN  Outcome: Ongoing  4/16/2021 1839 by Shaunna Tan RN  Outcome: Ongoing     Problem: Falls - Risk of:  Goal: Will remain free from falls  Description: Will remain free from falls  4/17/2021 0031 by Guy Avila RN  Outcome: Ongoing  4/16/2021 1839 by Shaunna Tan RN  Outcome: Ongoing  Goal: Absence of physical injury  Description: Absence of physical injury  4/17/2021 0031 by Guy Avila RN  Outcome: Ongoing  4/16/2021 1839 by Shaunna Tan RN  Outcome: Ongoing     Problem: Confusion - Acute:  Goal: Absence of continued neurological deterioration signs and symptoms  Description: Absence of continued neurological deterioration signs and symptoms  4/17/2021 0031 by Guy Avila RN  Outcome: Ongoing  4/16/2021 1839 by Sahunna Tan RN  Outcome: Ongoing  Goal: Mental status will be restored to baseline  Description: Mental status will be restored to baseline  4/17/2021 0031 by Guy Avila RN  Outcome: Ongoing  4/16/2021 1839 by Shaunna Tan RN  Outcome: Ongoing     Problem: Discharge Planning:  Goal: Ability to perform activities of daily living will improve  Description: Ability to perform activities of daily living will improve  4/17/2021 0031 by Guy Avila RN  Outcome: Ongoing  4/16/2021 1839 by Shaunna Tan RN  Outcome: Ongoing  Goal: Participates in care planning  Description: Participates in care planning  4/17/2021 0031 by Guy Avila RN  Outcome: Ongoing  4/16/2021 1839 by Shaunna Tan RN  Outcome: Ongoing  Goal: Discharged to appropriate level of care  Description: Discharged to appropriate level of care  4/17/2021 0031 by Guy Avila RN  Outcome: Ongoing  4/16/2021 1839 by Shaunna Tan RN  Outcome: Ongoing     Problem: Injury - Risk of, Physical Injury:  Goal: Will remain free from falls  Description: Will remain free from falls  4/17/2021 0031 by Guy Avila RN  Outcome: Ongoing  4/16/2021 1839 by Shaunna Tan RN  Outcome: Ongoing  Goal: Absence of physical injury  Description: Absence of physical injury  4/17/2021 0031 by Abad Payan RN  Outcome: Ongoing  4/16/2021 1839 by Zelda Starkey RN  Outcome: Ongoing     Problem: Mood - Altered:  Goal: Mood stable  Description: Mood stable  4/17/2021 0031 by Abad Payan RN  Outcome: Ongoing  4/16/2021 1839 by Zelda Starkey RN  Outcome: Ongoing  Goal: Absence of abusive behavior  Description: Absence of abusive behavior  4/17/2021 0031 by Abad Payan RN  Outcome: Ongoing  4/16/2021 1839 by Zelda Starkey RN  Outcome: Ongoing  Goal: Verbalizations of feeling emotionally comfortable while being cared for will increase  Description: Verbalizations of feeling emotionally comfortable while being cared for will increase  4/17/2021 0031 by Abad Payan RN  Outcome: Ongoing  4/16/2021 1839 by Zelda Starkey RN  Outcome: Ongoing     Problem: Psychomotor Activity - Altered:  Goal: Absence of psychomotor disturbance signs and symptoms  Description: Absence of psychomotor disturbance signs and symptoms  4/17/2021 0031 by Abad Payan RN  Outcome: Ongoing  4/16/2021 1839 by Zelda Starkey RN  Outcome: Ongoing     Problem: Sensory Perception - Impaired:  Goal: Demonstrations of improved sensory functioning will increase  Description: Demonstrations of improved sensory functioning will increase  4/17/2021 0031 by Abad Payan RN  Outcome: Ongoing  4/16/2021 1839 by Zelda Starkey RN  Outcome: Ongoing  Goal: Decrease in sensory misperception frequency  Description: Decrease in sensory misperception frequency  4/17/2021 0031 by Abad Payan RN  Outcome: Ongoing  4/16/2021 1839 by Zelda Starkey RN  Outcome: Ongoing  Goal: Able to refrain from responding to false sensory perceptions  Description: Able to refrain from responding to false sensory perceptions  4/17/2021 0031 by Abad Payan RN  Outcome: Ongoing  4/16/2021 1839 by Zelda Starkey Ongoing     Problem: Venous Thromboembolism:  Goal: Absence of deep vein thrombosis  Description: Absence of deep vein thrombosis  4/17/2021 0031 by Naif Carmichael RN  Outcome: Ongoing  4/16/2021 1839 by Andree Andrews RN  Outcome: Ongoing  Goal: Absence of signs or symptoms of impaired coagulation  Description: Absence of signs or symptoms of impaired coagulation  4/17/2021 0031 by Naif Carmichael RN  Outcome: Ongoing  4/16/2021 1839 by Andree Andrews RN  Outcome: Ongoing  Goal: Will show no signs or symptoms of venous thromboembolism  Description: Will show no signs or symptoms of venous thromboembolism  4/17/2021 0031 by Naif Carmichael RN  Outcome: Ongoing  4/16/2021 1839 by Andree Andrews RN  Outcome: Ongoing     Problem: Non-Violent Restraints  Goal: Removal from restraints as soon as assessed to be safe  4/17/2021 0031 by Naif Carmichael RN  Outcome: Not Met This Shift  4/16/2021 1839 by Andree Andrwes RN  Outcome: Not Met This Shift  Goal: No harm/injury to patient while restraints in use  4/17/2021 0031 by Naif Carmichael RN  Outcome: Not Met This Shift  4/16/2021 1839 by Andree Andrews RN  Outcome: Ongoing  Goal: Patient's dignity will be maintained  4/17/2021 0031 by Naif Carmichael RN  Outcome: Not Met This Shift  4/16/2021 1839 by Andree Andrews RN  Outcome: Ongoing     Problem: Nutrition  Goal: Optimal nutrition therapy  Description: Nutrition Problem #1: Inadequate oral intake  Intervention: Food and/or Nutrient Delivery: Continue NPO(Re-start enteral nutrition post-op as able)  Nutritional Goals: EN intake to meet >75% of estimated nutrition needs      4/17/2021 0031 by Naif Carmichael RN  Outcome: Ongoing  4/16/2021 1839 by Andree Andrews RN  Outcome: Ongoing     Problem: Neurological  Goal: Maximum potential motor/sensory/cognitive function  4/17/2021 0031 by Naif Carmichael RN  Outcome: Ongoing  4/16/2021 1839 by Andree Andrews RN  Outcome: Ongoing

## 2021-04-17 NOTE — PLAN OF CARE
Problem: OXYGENATION/RESPIRATORY FUNCTION  Goal: Patient will maintain patent airway  4/17/2021 0855 by Naif Carmichael RN  Outcome: Ongoing  4/17/2021 0031 by Naif Carmichael RN  Outcome: Ongoing  4/16/2021 2009 by Ashkan Maldonado RCP  Outcome: Ongoing  Goal: Patient will achieve/maintain normal respiratory rate/effort  Description: Respiratory rate and effort will be within normal limits for the patient  4/17/2021 0855 by Naif Carmichael RN  Outcome: Ongoing  4/17/2021 0031 by Naif Carmichael RN  Outcome: Ongoing  4/16/2021 2009 by Ashkan Maldonado RCP  Outcome: Ongoing     Problem: MECHANICAL VENTILATION  Goal: Patient will maintain patent airway  4/17/2021 0855 by Naif Carmichael RN  Outcome: Ongoing  4/17/2021 0031 by Naif Carmichael RN  Outcome: Ongoing  4/16/2021 2009 by Ashkan Maldonado RCP  Outcome: Ongoing  Goal: Oral health is maintained or improved  4/17/2021 0855 by Naif Carmichael RN  Outcome: Ongoing  4/17/2021 0031 by Naif Carmichael RN  Outcome: Ongoing  4/16/2021 2009 by Ashkan Maldonado RCP  Outcome: Ongoing  Goal: ET tube will be managed safely  4/16/2021 2009 by Ashkan Maldonado RCP  Outcome: Completed  Goal: Ability to express needs and understand communication  4/17/2021 0855 by Naif Carmichael RN  Outcome: Ongoing  4/17/2021 0031 by Naif Carmichael RN  Outcome: Ongoing  4/16/2021 2009 by Ashkan Maldonado RCP  Outcome: Ongoing  Goal: Mobility/activity is maintained at optimum level for patient  4/17/2021 0855 by Naif Carmichael RN  Outcome: Ongoing  4/17/2021 0031 by Naif Carmichael RN  Outcome: Ongoing  4/16/2021 2009 by Ashkan Maldonado RCP  Outcome: Ongoing  Goal: Tracheostomy will be managed safely  4/17/2021 0855 by Naif Carmichael RN  Outcome: Ongoing  4/17/2021 0031 by Naif Carmichael RN  Outcome: Ongoing  4/16/2021 2010 by Ashkan Maldonado RCP  Outcome: Ongoing     Problem: SKIN INTEGRITY  Goal: Skin integrity is maintained or improved  4/17/2021 0855 by Nafi Carmichael RN  Outcome: Ongoing  4/17/2021 0031 by Rosalina Fofana RN  Outcome: Ongoing     Problem: Falls - Risk of:  Goal: Will remain free from falls  Description: Will remain free from falls  4/17/2021 0855 by Rosalina Fofana RN  Outcome: Ongoing  4/17/2021 0031 by Rosalina Fofana RN  Outcome: Ongoing  Goal: Absence of physical injury  Description: Absence of physical injury  4/17/2021 0855 by Rosalina Fofana RN  Outcome: Ongoing  4/17/2021 0031 by Rosalina Fofana RN  Outcome: Ongoing     Problem: Confusion - Acute:  Goal: Absence of continued neurological deterioration signs and symptoms  Description: Absence of continued neurological deterioration signs and symptoms  4/17/2021 0855 by Rosalina Fofana RN  Outcome: Ongoing  4/17/2021 0031 by Rosalina Fofana RN  Outcome: Ongoing  Goal: Mental status will be restored to baseline  Description: Mental status will be restored to baseline  4/17/2021 0855 by Rosalina Fofana RN  Outcome: Ongoing  4/17/2021 0031 by Rosalina Fofana RN  Outcome: Ongoing     Problem: Discharge Planning:  Goal: Ability to perform activities of daily living will improve  Description: Ability to perform activities of daily living will improve  4/17/2021 0855 by Rosalina Fofana RN  Outcome: Ongoing  4/17/2021 0031 by Rosalina Fofana RN  Outcome: Ongoing  Goal: Participates in care planning  Description: Participates in care planning  4/17/2021 0855 by Rosalina Fofana RN  Outcome: Ongoing  4/17/2021 0031 by Rosalina Fofana RN  Outcome: Ongoing  Goal: Discharged to appropriate level of care  Description: Discharged to appropriate level of care  4/17/2021 0855 by Rosalina Fofana RN  Outcome: Ongoing  4/17/2021 0031 by Rosalina Fofana RN  Outcome: Ongoing     Problem: Injury - Risk of, Physical Injury:  Goal: Will remain free from falls  Description: Will remain free from falls  4/17/2021 0855 by Rosalina Fofana RN  Outcome: Ongoing  4/17/2021 0031 by Rosalina Fofana RN  Outcome: Ongoing  Goal: Absence of physical injury  Description: Absence of physical injury  4/17/2021 0855 by Rosalina Fofana RN  Outcome: Ongoing  4/17/2021 0031 by Franki Blevins RN  Outcome: Ongoing     Problem: Mood - Altered:  Goal: Mood stable  Description: Mood stable  4/17/2021 0855 by Franki Blevins RN  Outcome: Ongoing  4/17/2021 0031 by Franki Blevins RN  Outcome: Ongoing  Goal: Absence of abusive behavior  Description: Absence of abusive behavior  4/17/2021 0855 by Franki Blevins RN  Outcome: Ongoing  4/17/2021 0031 by Franki Blevins RN  Outcome: Ongoing  Goal: Verbalizations of feeling emotionally comfortable while being cared for will increase  Description: Verbalizations of feeling emotionally comfortable while being cared for will increase  4/17/2021 0855 by Franki Blevins RN  Outcome: Ongoing  4/17/2021 0031 by Franki Blevins RN  Outcome: Ongoing     Problem: Sensory Perception - Impaired:  Goal: Demonstrations of improved sensory functioning will increase  Description: Demonstrations of improved sensory functioning will increase  4/17/2021 0855 by Franki Blevins RN  Outcome: Ongoing  4/17/2021 0031 by Franki Blevins RN  Outcome: Ongoing  Goal: Decrease in sensory misperception frequency  Description: Decrease in sensory misperception frequency  4/17/2021 0855 by Franki Blevins RN  Outcome: Ongoing  4/17/2021 0031 by Franki Blevins RN  Outcome: Ongoing  Goal: Able to refrain from responding to false sensory perceptions  Description: Able to refrain from responding to false sensory perceptions  4/17/2021 0855 by Franki Blevins RN  Outcome: Ongoing  4/17/2021 0031 by Franki Blevins RN  Outcome: Ongoing  Goal: Demonstrates accurate environmental perceptions  Description: Demonstrates accurate environmental perceptions  4/17/2021 0855 by Franki Blevins RN  Outcome: Ongoing  4/17/2021 0031 by Franik Blevins RN  Outcome: Ongoing  Goal: Able to distinguish between reality-based and nonreality-based thinking  Description: Able to distinguish between reality-based and nonreality-based thinking  4/17/2021 0855 by Franki Blevins RN  Outcome: Ongoing  4/17/2021 0031 by Alvarez King RN  Outcome: Ongoing  Goal: Able to interrupt nonreality-based thinking  Description: Able to interrupt nonreality-based thinking  4/17/2021 0855 by Alvarez King RN  Outcome: Ongoing  4/17/2021 0031 by Alvarez King RN  Outcome: Ongoing     Problem: Sleep Pattern Disturbance:  Goal: Appears well-rested  Description: Appears well-rested  4/17/2021 0855 by Alvarez King RN  Outcome: Ongoing  4/17/2021 0031 by Alvarez King RN  Outcome: Ongoing     Problem: Infection - Surgical Site:  Goal: Will show no infection signs and symptoms  Description: Will show no infection signs and symptoms  4/17/2021 0855 by Alvarez King RN  Outcome: Ongoing  4/17/2021 0031 by Alvarez King RN  Outcome: Ongoing     Problem: Mobility - Impaired:  Goal: Mobility will improve to maximum level  Description: Mobility will improve to maximum level  4/17/2021 0855 by Alvarez King RN  Outcome: Ongoing  4/17/2021 0031 by Alvarez King RN  Outcome: Ongoing     Problem: Venous Thromboembolism:  Goal: Absence of deep vein thrombosis  Description: Absence of deep vein thrombosis  4/17/2021 0855 by Alvarez King RN  Outcome: Ongoing  4/17/2021 0031 by Alvarez King RN  Outcome: Ongoing  Goal: Absence of signs or symptoms of impaired coagulation  Description: Absence of signs or symptoms of impaired coagulation  4/17/2021 0855 by Alvarez King RN  Outcome: Ongoing  4/17/2021 0031 by Alvarez King RN  Outcome: Ongoing  Goal: Will show no signs or symptoms of venous thromboembolism  Description: Will show no signs or symptoms of venous thromboembolism  4/17/2021 0855 by Alvarez King RN  Outcome: Ongoing  4/17/2021 0031 by Alvarez King RN  Outcome: Ongoing     Problem: Non-Violent Restraints  Goal: Removal from restraints as soon as assessed to be safe  4/17/2021 0855 by Alvarez King RN  Outcome: Not Met This Shift  4/17/2021 0031 by Alvarez King RN  Outcome: Not Met This Shift  Goal: No harm/injury to patient while restraints in use  4/17/2021 0855 by Rivka Castellano RN  Outcome: Not Met This Shift  4/17/2021 0031 by Rivka Castellano RN  Outcome: Not Met This Shift  Goal: Patient's dignity will be maintained  4/17/2021 0855 by Rivka Castellano RN  Outcome: Not Met This Shift  4/17/2021 0031 by Rivka Castellano RN  Outcome: Not Met This Shift     Problem: Nutrition  Goal: Optimal nutrition therapy  Description: Nutrition Problem #1: Inadequate oral intake  Intervention: Food and/or Nutrient Delivery: Continue NPO(Re-start enteral nutrition post-op as able)  Nutritional Goals: EN intake to meet >75% of estimated nutrition needs      4/17/2021 0855 by Rivka Castellano RN  Outcome: Ongoing  4/17/2021 0031 by Rivka Castellano RN  Outcome: Ongoing

## 2021-04-17 NOTE — PROGRESS NOTES
around central line in common femoral vein. Superficial venous thrombosis identified in the great saphenous vein. 2. Heparin infusion ; will hold 4 hrs prior to IR PEG  3. Repeat CTH at 24 hrs of anticoagulation  2. Right: negative  3. Repeat ultrasound DVT  7. DVT prophylaxis: Lovenox 1mg/kg BID for dvt  8. Hem/Onc consult:  1. Believed thrombocytopenia due to liver cirrhosis  9. Pancytopenia  1. WBC: 2.7 (3.5) (5.5) (4.3) (6.2)(2.6) (2.3)  2. Hgb: 6.9 (7.4) (8.0) (5.7)(8.1) (8.6) (7.8) (6.6) (7.0) (7.4)(6.9)   1. Transfusing 1 unit pRBCs  3. Plts: 67 (62) (71) (89) (113) (64) (67) (64) (72) (51)   4. Discontinued pepcid 4/10  4. Pulmonary  1. Ventilator Settings:  1. Mode: PRVC  1. RR: 18   2. TV: 600  1. 8cc/kg  3. PEEP: 10  4. FiO2: 30  2. AB. 7.432 pH  2. 97.5 pO2  3. 35.7 pCO2  4. 23.8 pHCO3  5. 1.02 Lactic acid  3. PF ratio: 325  4. Trach plan  1. Trach on 4/15  1. 8-0 Shiley   2. PEEP at 10 currently  3. Wean FiO2 as tolerated to 30%  5. CXR: pending  5. Renal/Electrolytes  1. HyperNatremia  1. 149 (153) (150)  2. 300 q4hr free water  1. Dc   3. Down trending  2. Lasix BID 20  1. On hold  3. Spironolactone daily 50  4. BUN/Cr:  44/1.01 (46/1.14) (41/0.96) (42/0.88)  (32/0.90)   1. BUN/Cr ratio: 43.6 (40.4) (42.7) (48)  5. Potasium 3.7 ; Cl 121 ; CO2 22 ; Mg 2.9 ; Ca 7.9 ; Phos 3.4  1. K replacement  6. D5 1/2 NS  1. Total fluids cap 125  7. I/Os: 1777/1185= +592  8. Urine Output:  1. 0.4 cc/kg/hr 24 hrs  2. 0.4 cc/kg/hr 8 hrs  9. +10,379 since admisison  6. GI  1. Diet: TF  1. TF at goal  2. Received PEG   1. Can be used @ 5pm  3. Pancreatitis   1. Lipase not elevated  2. ALT 36 (42) (47)  3. AST 57 (73) (102) (98) (92)   4. Total bilirubin: 1.33 (1.32) (2.14)  1. Direct bilirubin 0.69 /  4. Hyperammonemia   1. 49 @  (64) (105)  2. Ammonia level daily till down   3. Ammonia 56   7. ID  1. Neuro fever  1. Tmax 24 hrs: 100  1. Tylenol 1000mg TID  2. Motrin 400 q4  2.  Wbc 3.5 hrs  10. Skin  1. Bacitracin ointment  11. Lines  1. PIV x2  2. Arterial Line L Fem  3. NG  4. Trach  5. PEG  1. Can use @ 5pm  6. External urinary cath  7. Rectal tube  12. Prophylaxis  1. GI prophylaxis: Lansoprazole  2. DVT prophylaxis: Heparin infusion  13. Diet  1. TF  14. Disposition  1. Remain in ICU      Overall plan  Lipase and LFTs trending  IR PEG   Trach   procal - not significantly elevated  Febrile with increasing fever--likely neurogenic   Ammonia level - wnl  Insulin ggt continue  Hold lasix   Lovenox BID  Lansoprazole     IR PEG   Lovenox  Free water to 300q4 - hold. Will address tomorrow. Added D5 water @ 30cc  Dc LR; switch to D5 1/2 NS 1-125  2g Mag  Holding TF for PEG  Repeat DVT ultrasound  Decreased PEEP to 10       CHECKLIST    CAM-ICU RASS: -1  RESTRAINTS: none  IVF: D5 1/2 NS 0-125  NUTRITION: TF   ANTIBIOTICS: per OR  GI: lansoprazole   DVT: lovenox  GLYCEMIC CONTROL: insulin gtt  HOB >45: yes  MOBILITY: PT/OT after surgery  SBT: na  IS: na    SUBJECTIVE      Febrile overnight. GCS 8NT today; spontaneously opens eyes; withdraws to pain. Received IR PEG . No issues overnight. Plan for LTAC soon. OBJECTIVE  VITALS: Temp: Temp: 98.8 °F (37.1 °C)Temp  Av.5 °F (37.5 °C)  Min: 98.8 °F (37.1 °C)  Max: 100 °F (86.5 °C) BP Systolic (83LBL), YOT:011 , Min:135 , YPW:986   Diastolic (02SLC), INI:00, Min:31, Max:56   Pulse Pulse  Av.9  Min: 64  Max: 82 Resp Resp  Av.8  Min: 16  Max: 31 Pulse ox SpO2  Av.4 %  Min: 93 %  Max: 100 %    Physical Exam  Vitals reviewed: Limited due to intubation and sedation. Constitutional:       Appearance: He is obese. Comments: Elderly white male   HENT:      Head: Normocephalic. Comments: Incision to scalp     Right Ear: External ear normal.      Left Ear: External ear normal.      Nose:      Comments: NG in place     Mouth/Throat:      Comments: ETT in place  Eyes:      General: No scleral icterus. TECHNOLOGIST PROVIDED HISTORY:   intubated   Reason for Exam: portable supine/ intubated   Acuity: Acute   Type of Exam: Ongoing       FINDINGS:   AP portable view of the chest time stamped at 555 hours demonstrates   overlying cardiac monitoring electrodes.  Endotracheal tube terminates 1.4 cm   above the michael, low lying.  Intestinal tube extends below the diaphragm,   tip out of the field of view.  There is no significant change and left   basilar opacity likely combination of left effusion and atelectasis.  Right   lung is clear.  Heart size is stable.  No extrapleural air.  Osseous   structures are stable. XR CHEST PORTABLE [[id number]]    Collected: 04/10/21 0622    Updated: 04/10/21 0805    Narrative:     EXAMINATION:   ONE XRAY VIEW OF THE CHEST     4/10/2021 6:18 am     COMPARISON:   9 April 2021     HISTORY:   ORDERING SYSTEM PROVIDED HISTORY: intubated   TECHNOLOGIST PROVIDED HISTORY:   intubated   Reason for Exam: Supine port, intubation     FINDINGS:   AP portable view of the chest time stamped at 548 hours demonstrates an   endotracheal tube terminating 1.6 cm above the michael, low lying, and an   intestinal tube extending beyond the body of the stomach, tip out of the   field of view.  Heart size is stable.  The patient has continued left   effusion and basilar atelectasis.  Vascularity is top normal.    Impression:     Low lying endotracheal tube.  Continued left effusion and probable   atelectasis.  Cardiomegaly is stable.  Central vasculature is top normal.     RECOMMENDATION:   Advise retraction of ET tube by 2-3 cm. The findings were sent to the Radiology Results [address] at 8:02   am on 4/10/2021to be communicated to a licensed caregiver.       EXAMINATION:   ONE XRAY VIEW OF THE CHEST       4/11/2021 8:05 am       COMPARISON:   04/10/2021       HISTORY:   ORDERING SYSTEM PROVIDED HISTORY: intubated   TECHNOLOGIST PROVIDED HISTORY:   intubated   Reason for Exam: supine

## 2021-04-17 NOTE — CARE COORDINATION
Discharge [address] Case Management Department  Written by: Whit Salgado RN    Patient Name: Gabriel Jacobo  Attending Provider: Prateek Jeffries MD  Admit Date: 2021  7:18 PM  MRN: [id number]  Account: [de-identified]                     : 1950  Discharge Date: 2021      Disposition: [facility], [place]

## 2021-04-17 NOTE — PLAN OF CARE
Problem: OXYGENATION/RESPIRATORY FUNCTION  Goal: Patient will maintain patent airway  4/17/2021 0031 by Divya Reynoso RN  Outcome: Ongoing  4/16/2021 2009 by Radhika Ortega RCP  Outcome: Ongoing  4/16/2021 1839 by Jules Recinos RN  Outcome: Ongoing  Goal: Patient will achieve/maintain normal respiratory rate/effort  Description: Respiratory rate and effort will be within normal limits for the patient  4/17/2021 0031 by Divya Reynoso RN  Outcome: Ongoing  4/16/2021 2009 by Radhika Ortega RCP  Outcome: Ongoing  4/16/2021 1839 by Jules Recinos RN  Outcome: Ongoing     Problem: MECHANICAL VENTILATION  Goal: Patient will maintain patent airway  4/17/2021 0031 by Divya Reynoso RN  Outcome: Ongoing  4/16/2021 2009 by Radhika Ortega RCP  Outcome: Ongoing  4/16/2021 1839 by Jules Recinos RN  Outcome: Ongoing  Goal: Oral health is maintained or improved  4/17/2021 0031 by Divya Reynoso RN  Outcome: Ongoing  4/16/2021 2009 by Radhika Ortega RCP  Outcome: Ongoing  4/16/2021 1839 by Jules Recinos RN  Outcome: Ongoing  Goal: ET tube will be managed safely  4/16/2021 2009 by Radhika Ortega RCP  Outcome: Completed  4/16/2021 1839 by Jules Recinos RN  Outcome: Ongoing  Goal: Ability to express needs and understand communication  4/17/2021 0031 by Divya Reynoso RN  Outcome: Ongoing  4/16/2021 2009 by Radhika Ortega RCP  Outcome: Ongoing  4/16/2021 1839 by Jules Recinos RN  Outcome: Ongoing  Goal: Mobility/activity is maintained at optimum level for patient  4/17/2021 0031 by Divya Reynoso RN  Outcome: Ongoing  4/16/2021 2009 by Radhika Ortega RCP  Outcome: Ongoing  4/16/2021 1839 by Jules Recinos RN  Outcome: Ongoing  Goal: Tracheostomy will be managed safely  4/17/2021 0031 by Divya Reynoso RN  Outcome: Ongoing  4/16/2021 2010 by Radhika Ortega RCP  Outcome: Ongoing     Problem: SKIN INTEGRITY  Goal: Skin integrity is maintained or improved  4/17/2021 0031 by Shady Tabor RN  Outcome: Ongoing  4/16/2021 1839 by Anu Navarro RN  Outcome: Ongoing

## 2021-04-18 LAB
ABO/RH: NORMAL
ANTIBODY SCREEN: NEGATIVE
ARM BAND NUMBER: NORMAL
BLD PROD TYP BPU: NORMAL
CROSSMATCH RESULT: NORMAL
DISPENSE STATUS BLOOD BANK: NORMAL
EXPIRATION DATE: NORMAL
TRANSFUSION STATUS: NORMAL
UNIT DIVISION: 0
UNIT NUMBER: NORMAL

## 2021-04-20 NOTE — DISCHARGE SUMMARY
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 to as low as reasonably achievable. COMPARISON: None HISTORY: ORDERING SYSTEM PROVIDED HISTORY: TRAUMA TECHNOLOGIST PROVIDED HISTORY: trauma; ORDERING SYSTEM PROVIDED HISTORY: trauma TECHNOLOGIST PROVIDED HISTORY: trauma FINDINGS: BONES/ALIGNMENT: There is normal alignment of the spine. The vertebral body heights are maintained. No osseous destructive lesion is seen. DEGENERATIVE CHANGES: Multilevel spondylosis and degenerative disc disease in the thoracic, upper lumbar and mid lumbar spine. Central canal is intact. SOFT TISSUES/RETROPERITONEUM: No paraspinal mass is seen. Vascular calcifications are seen compatible with atherosclerotic disease. No acute bony abnormalities in the thoracic and lumbar spine. Ct Thoracic Spine Trauma Reconstruction    Result Date: 4/6/2021  EXAMINATION: CT OF THE LUMBAR SPINE WITHOUT CONTRAST; CT OF THE THORACIC SPINE WITHOUT CONTRAST  4/6/2021 TECHNIQUE: CT of the lumbar spine was performed without the administration of intravenous contrast. Multiplanar reformatted images are provided for review. Dose modulation, iterative reconstruction, and/or weight based adjustment of the mA/kV was utilized to reduce the radiation dose to as low as reasonably achievable.; CT of the thoracic spine was performed without the administration of intravenous contrast. Multiplanar reformatted images are provided for review. Dose modulation, iterative reconstruction, and/or weight based adjustment of the mA/kV was utilized to reduce the radiation dose to as low as reasonably achievable. COMPARISON: None HISTORY: ORDERING SYSTEM PROVIDED HISTORY: TRAUMA TECHNOLOGIST PROVIDED HISTORY: trauma; ORDERING SYSTEM PROVIDED HISTORY: trauma TECHNOLOGIST PROVIDED HISTORY: trauma FINDINGS: BONES/ALIGNMENT: There is normal alignment of the spine. The vertebral body heights are maintained. No osseous destructive lesion is seen. DEGENERATIVE CHANGES: Multilevel spondylosis and degenerative disc disease in the thoracic, upper lumbar and mid lumbar spine. Central canal is intact. SOFT TISSUES/RETROPERITONEUM: No paraspinal mass is seen. Vascular calcifications are seen compatible with atherosclerotic disease. No acute bony abnormalities in the thoracic and lumbar spine.        DISCHARGE INSTRUCTIONS     Discharge Medications:        Medication List      START taking these medications    enoxaparin 100 MG/ML injection  Commonly known as: LOVENOX  Inject 0.9 mLs into the skin 2 times daily     ibuprofen 100 MG/5ML suspension  Commonly known as: ADVIL;MOTRIN  20 mLs by Per NG tube route every 6 hours as needed for Fever     ipratropium-albuterol 0.5-2.5 (3) MG/3ML Soln nebulizer solution  Commonly known as: DUONEB  Inhale 3 mLs into the lungs every 6 hours as needed for Shortness of Breath     lactulose 10 GM/15ML solution  Commonly known as: CHRONULAC  Take 30 mLs by mouth 3 times daily  Replaces: lactulose 10 g packet     lansoprazole 3 MG/ML Susp  10 mLs by Per NG tube route 2 times daily for 7 days        CONTINUE taking these medications amLODIPine 5 MG tablet  Commonly known as: NORVASC     ferrous sulfate 325 (65 Fe) MG tablet  Commonly known as: IRON 325     furosemide 20 MG tablet  Commonly known as: LASIX  Take 1 tablet by mouth 2 times daily     spironolactone 50 MG tablet  Commonly known as: ALDACTONE     traZODone 100 MG tablet  Commonly known as: DESYREL        STOP taking these medications    lactulose 10 g packet  Commonly known as: CEPHULAC  Replaced by: lactulose 10 GM/15ML solution     losartan 100 MG tablet  Commonly known as: COZAAR     Myrbetriq 50 MG Tb24  Generic drug: mirabegron     omeprazole 20 MG delayed release capsule  Commonly known as: PRILOSEC           Where to Get Your Medications      Information about where to get these medications is not yet available    Ask your nurse or doctor about these medications  · enoxaparin 100 MG/ML injection  · furosemide 20 MG tablet  · ibuprofen 100 MG/5ML suspension  · ipratropium-albuterol 0.5-2.5 (3) MG/3ML Soln nebulizer solution  · lactulose 10 GM/15ML solution  · lansoprazole 3 MG/ML Susp       Diet: No diet orders on file diet as tolerated  Activity: As instructed WEIGHT BEARING STATUS: Weight bearing: Non weight bearing right lower extremity, non weight bearing left lower extremity.   -Fracture shoe to LLE  Wound Care: Daily and as needed. DISPOSITION: LTAC    Follow-up:  Zara Martin MD  [address] [place] [id number]  [phone]    Schedule an appointment as soon as possible for a visit  Follow up with PCP re: hosptial visit     Holden Constantino, [address] [facility], [facility] [address]  [phone]    Go on 4/30/2021  Follow up with Orthopedic Surgery on 4/30 at 8:20 a.m.  Please Call to confirm the appoitment     Daren Ramos, [address] [facility] [address] [facility]  [phone]    Schedule an appointment as soon as possible for a visit in 4 weeks  Follow up with Neurosurgery     [address], [place]  [place] [address]  [phone]      Follow up with Hematology and Oncology Physican         SIGNED:  YURIY Denton CNP   4/20/2021, 10:24 AM  Time Spent for discharge: 35 minutes

## 2021-04-29 DIAGNOSIS — S72.351D CLOSED DISPLACED COMMINUTED FRACTURE OF SHAFT OF RIGHT FEMUR WITH ROUTINE HEALING, SUBSEQUENT ENCOUNTER: Primary | ICD-10-CM

## 2021-05-04 ENCOUNTER — TELEPHONE (OUTPATIENT)
Dept: ORTHOPEDIC SURGERY | Age: 71
End: 2021-05-04

## 2021-05-04 NOTE — TELEPHONE ENCOUNTER
Patient was a no-show for his appointment with Dr. Stephanie Shea on 4/30/21. I spoke with Nurse Lefty at [facility]. She assumed he was to follow up with Dr. Stephanie Shea after his discharge from [facility]. The nurse will call back to reschedule the appointment.

## 2021-05-19 ENCOUNTER — TELEPHONE (OUTPATIENT)
Dept: ORTHOPEDIC SURGERY | Age: 71
End: 2021-05-19

## 2021-05-19 NOTE — TELEPHONE ENCOUNTER
Patient was a no-show for his appointment with Dr. Venancio Lawrence in the Orthopedic Specialist Department on 4/30/21. The patient's wife, Win Lr, says the patient is in [facility] and she has cancelled all of his appointments.

## 2023-05-25 NOTE — PROGRESS NOTES
Occupational Therapy Not Seen Note    DATE: 2021  Name: Norm Barton  : 1950  MRN: [id number]    Patient not available for Occupational Therapy due to:     Intubated and Sedation: propofol    Next Scheduled Treatment: check back 21    Electronically signed by JONES Navarrete OTR/L on 2021 at 8:40 AM headache

## 2023-10-25 NOTE — PLAN OF CARE
Problem: MECHANICAL VENTILATION  Goal: Oral health is maintained or improved  4/10/2021 0600 by Rajesh Reed RN  Outcome: Met This Shift  4/10/2021 0117 by Arnold Ceballos RCP  Outcome: Ongoing  Goal: ET tube will be managed safely  4/10/2021 0600 by Rajesh Reed RN  Outcome: Met This Shift  4/10/2021 0117 by Arnold Ceballos RCP  Outcome: Ongoing     Problem: SKIN INTEGRITY  Goal: Skin integrity is maintained or improved  4/10/2021 0600 by Rajesh Reed RN  Outcome: Met This Shift  4/10/2021 0117 by Arnold Ceballos RCP  Outcome: Ongoing     Problem: Falls - Risk of:  Goal: Will remain free from falls  Description: Will remain free from falls  Outcome: Met This Shift  Goal: Absence of physical injury  Description: Absence of physical injury  Outcome: Met This Shift     Problem: Injury - Risk of, Physical Injury:  Goal: Will remain free from falls  Description: Will remain free from falls  Outcome: Met This Shift  Goal: Absence of physical injury  Description: Absence of physical injury  Outcome: Met This Shift     Problem: Skin Integrity:  Goal: Will show no infection signs and symptoms  Description: Will show no infection signs and symptoms  Outcome: Met This Shift  Goal: Absence of new skin breakdown  Description: Absence of new skin breakdown  Outcome: Met This Shift     Problem: Infection - Surgical Site:  Goal: Will show no infection signs and symptoms  Description: Will show no infection signs and symptoms  Outcome: Met This Shift     Problem: OXYGENATION/RESPIRATORY FUNCTION  Goal: Patient will maintain patent airway  4/10/2021 0600 by Rajesh Reed RN  Outcome: Ongoing  4/10/2021 0117 by Arnold Ceballos RCP  Outcome: Ongoing  Goal: Patient will achieve/maintain normal respiratory rate/effort  Description: Respiratory rate and effort will be within normal limits for the patient  4/10/2021 0600 by Rajesh Reed RN  Outcome: Ongoing  4/10/2021 0117 by Arnold Ceballos RCP  Outcome: Ongoing     Problem: MECHANICAL VENTILATION  Goal: Patient will maintain patent airway  4/10/2021 0117 by Ros Rivera RCP  Outcome: Ongoing  Goal: Ability to express needs and understand communication  4/10/2021 0600 by Chelsea Bowens RN  Outcome: Ongoing  4/10/2021 0117 by Ros Rivera RCP  Outcome: Ongoing  Goal: Mobility/activity is maintained at optimum level for patient  4/10/2021 0600 by Chelsea Bowens RN  Outcome: Ongoing  4/10/2021 0117 by Ros Rivera RCP  Outcome: Ongoing     Problem: Confusion - Acute:  Goal: Absence of continued neurological deterioration signs and symptoms  Description: Absence of continued neurological deterioration signs and symptoms  Outcome: Ongoing  Goal: Mental status will be restored to baseline  Description: Mental status will be restored to baseline  Outcome: Ongoing     Problem: Discharge Planning:  Goal: Ability to perform activities of daily living will improve  Description: Ability to perform activities of daily living will improve  Outcome: Ongoing  Goal: Participates in care planning  Description: Participates in care planning  Outcome: Ongoing  Goal: Discharged to appropriate level of care  Description: Discharged to appropriate level of care  Outcome: Ongoing     Problem: Mood - Altered:  Goal: Mood stable  Description: Mood stable  Outcome: Ongoing  Goal: Absence of abusive behavior  Description: Absence of abusive behavior  Outcome: Ongoing  Goal: Verbalizations of feeling emotionally comfortable while being cared for will increase  Description: Verbalizations of feeling emotionally comfortable while being cared for will increase  Outcome: Ongoing     Problem: Psychomotor Activity - Altered:  Goal: Absence of psychomotor disturbance signs and symptoms  Description: Absence of psychomotor disturbance signs and symptoms  Outcome: Ongoing     Problem: Sensory Perception - Impaired:  Goal: Demonstrations of improved sensory functioning will increase  Description: Demonstrations of improved sensory functioning will increase  Outcome: Ongoing  Goal: Decrease in sensory misperception frequency  Description: Decrease in sensory misperception frequency  Outcome: Ongoing  Goal: Able to refrain from responding to false sensory perceptions  Description: Able to refrain from responding to false sensory perceptions  Outcome: Ongoing  Goal: Demonstrates accurate environmental perceptions  Description: Demonstrates accurate environmental perceptions  Outcome: Ongoing  Goal: Able to distinguish between reality-based and nonreality-based thinking  Description: Able to distinguish between reality-based and nonreality-based thinking  Outcome: Ongoing  Goal: Able to interrupt nonreality-based thinking  Description: Able to interrupt nonreality-based thinking  Outcome: Ongoing     Problem: Mobility - Impaired:  Goal: Mobility will improve to maximum level  Description: Mobility will improve to maximum level  Outcome: Ongoing     Problem: Pain - Acute:  Goal: Pain level will decrease  Description: Pain level will decrease  Outcome: Ongoing 63

## (undated) DEVICE — GLOVE ORANGE PI 7   MSG9070

## (undated) DEVICE — FORCEPS BX L240CM WRK CHN 2.8MM STD CAP W/ NDL MIC MESH

## (undated) DEVICE — AGENT HEMSTAT W2XL14IN OXIDIZED REGENERATED CELOS ABSRB FOR

## (undated) DEVICE — DRESSING TRNSPAR W2XL2.75IN FLM SHT SEMIPERMEABLE WIND

## (undated) DEVICE — TOWEL,OR,DSP,ST,NATURAL,DLX,4/PK,20PK/CS: Brand: MEDLINE

## (undated) DEVICE — SUTURE VCRL SZ 0 L27IN ABSRB UD L36MM CT-1 1/2 CIR J260H

## (undated) DEVICE — GLOVE SURG SZ 65 THK91MIL LTX FREE SYN POLYISOPRENE

## (undated) DEVICE — HOLDER TUBE TRACH LG COTTON STRP HK LOOP CLOSURE BRTRC FOAM

## (undated) DEVICE — Device

## (undated) DEVICE — GLOVE ORANGE PI 7 1/2   MSG9075

## (undated) DEVICE — C-ARMOR C-ARM EQUIPMENT COVERS CLEAR STERILE UNIVERSAL FIT 12 PER CASE: Brand: C-ARMOR

## (undated) DEVICE — BIT DRL L330MM DIA4.2MM CALIB 100MM 3 FLUT QUIK CPL

## (undated) DEVICE — APPLICATOR MEDICATED 26 CC SOLUTION HI LT ORNG CHLORAPREP

## (undated) DEVICE — GUIDEWIRE ORTH L290MM DIA3.2MM FOR RG AG EXPERT FEM NAILING

## (undated) DEVICE — DRAPE,U/ SHT,SPLIT,PLAS,STERIL: Brand: MEDLINE

## (undated) DEVICE — MITT SURG PREP L ADH DISPOSABLE

## (undated) DEVICE — 6619 2 PTNT ISO SYS INCISE AREA&LT;(&GT;&&LT;)&GT;P: Brand: STERI-DRAPE™ IOBAN™ 2

## (undated) DEVICE — INTENDED FOR TISSUE SEPARATION, AND OTHER PROCEDURES THAT REQUIRE A SHARP SURGICAL BLADE TO PUNCTURE OR CUT.: Brand: BARD-PARKER ® CARBON RIB-BACK BLADES

## (undated) DEVICE — ROD RM L1150MM DIA2.5MM TI W/ EXTN BALL TIP FOR IM NAIL

## (undated) DEVICE — SVMMC ORTH SPL DRP PK

## (undated) DEVICE — GLOVE ORANGE PI 8   MSG9080

## (undated) DEVICE — DRESSING TRNSPAR W5XL4.5IN FLM SHT SEMIPERMEABLE WIND

## (undated) DEVICE — DRESSING,GAUZE,XEROFORM,CURAD,1"X8",ST: Brand: CURAD

## (undated) DEVICE — PROTECTOR ULN NRV PUR FOAM HK LOOP STRP ANATOMICALLY

## (undated) DEVICE — SUTURE VCRL SZ 2-0 L27IN ABSRB UD L22MM X-1 1/2 CIR REV CUT J459H

## (undated) DEVICE — DRAPE,REIN 53X77,STERILE: Brand: MEDLINE

## (undated) DEVICE — FOAM BUMP ROUND LARGE: Brand: MEDLINE INDUSTRIES, INC.

## (undated) DEVICE — APPLICATOR MEDICATED 10.5 CC SOLUTION HI LT ORNG CHLORAPREP

## (undated) DEVICE — BIT DRL L145MM DIA4.2MM NONSTERILE 3 FLUT NDL PNT QUIK CPL

## (undated) DEVICE — SYRINGE IRRIG 60ML SFT PLIABLE BLB EZ TO GRP 1 HND USE W/

## (undated) DEVICE — STOCKINETTE,IMPERVIOUS,12X48,STERILE: Brand: MEDLINE

## (undated) DEVICE — SUTURE PROL SZ 2-0 L30IN NONABSORBABLE BLU L26MM SH 1/2 CIR 8833H

## (undated) DEVICE — Z DISCONTINUED BY MEDLINE USE 2280062 TUBE TRACH SZ 8 L79MM OD12.2MM ID7.6MM CUF DISP INNR CANN

## (undated) DEVICE — BLADE CLIPPER GEN PURP NS

## (undated) DEVICE — GOWN,AURORA,NONREINFORCED,LARGE: Brand: MEDLINE